# Patient Record
Sex: FEMALE | Race: WHITE | Employment: OTHER | URBAN - METROPOLITAN AREA
[De-identification: names, ages, dates, MRNs, and addresses within clinical notes are randomized per-mention and may not be internally consistent; named-entity substitution may affect disease eponyms.]

---

## 2017-01-06 RX ORDER — CLONIDINE HYDROCHLORIDE 0.2 MG/1
TABLET ORAL
Qty: 60 TABLET | Refills: 0 | Status: SHIPPED | OUTPATIENT
Start: 2017-01-06 | End: 2017-02-05 | Stop reason: SDUPTHER

## 2017-01-06 RX ORDER — TRIAMTERENE AND HYDROCHLOROTHIAZIDE 37.5; 25 MG/1; MG/1
TABLET ORAL
Qty: 30 TABLET | Refills: 0 | Status: SHIPPED | OUTPATIENT
Start: 2017-01-06 | End: 2017-02-05 | Stop reason: SDUPTHER

## 2017-01-06 RX ORDER — DICYCLOMINE HCL 20 MG
TABLET ORAL
Qty: 60 TABLET | Refills: 0 | Status: SHIPPED | OUTPATIENT
Start: 2017-01-06 | End: 2017-01-25 | Stop reason: SDUPTHER

## 2017-01-10 RX ORDER — FLUTICASONE PROPIONATE 50 MCG
SPRAY, SUSPENSION (ML) NASAL
Qty: 1 BOTTLE | Refills: 3 | Status: SHIPPED | OUTPATIENT
Start: 2017-01-10 | End: 2017-02-06 | Stop reason: SDUPTHER

## 2017-01-10 RX ORDER — LORAZEPAM 1 MG/1
TABLET ORAL
Qty: 180 TABLET | Refills: 1 | OUTPATIENT
Start: 2017-01-10 | End: 2017-03-07 | Stop reason: SDUPTHER

## 2017-01-25 RX ORDER — TRAMADOL HYDROCHLORIDE 50 MG/1
TABLET ORAL
Qty: 240 TABLET | Refills: 0 | OUTPATIENT
Start: 2017-01-25 | End: 2017-02-22 | Stop reason: SDUPTHER

## 2017-01-25 RX ORDER — ACETAMINOPHEN AND CODEINE PHOSPHATE 300; 60 MG/1; MG/1
TABLET ORAL
Qty: 180 TABLET | Refills: 0 | OUTPATIENT
Start: 2017-01-25 | End: 2017-02-22 | Stop reason: SDUPTHER

## 2017-01-25 RX ORDER — DICYCLOMINE HCL 20 MG
TABLET ORAL
Qty: 60 TABLET | Refills: 3 | OUTPATIENT
Start: 2017-01-25 | End: 2017-04-04 | Stop reason: SDUPTHER

## 2017-01-31 ENCOUNTER — TELEPHONE (OUTPATIENT)
Dept: INTERNAL MEDICINE | Age: 62
End: 2017-01-31

## 2017-02-06 ENCOUNTER — TELEPHONE (OUTPATIENT)
Dept: INTERNAL MEDICINE | Age: 62
End: 2017-02-06

## 2017-02-06 RX ORDER — CLONIDINE HYDROCHLORIDE 0.2 MG/1
TABLET ORAL
Qty: 60 TABLET | Refills: 0 | Status: SHIPPED | OUTPATIENT
Start: 2017-02-06 | End: 2017-03-20 | Stop reason: SDUPTHER

## 2017-02-06 RX ORDER — FLUTICASONE PROPIONATE 50 MCG
SPRAY, SUSPENSION (ML) NASAL
Qty: 1 BOTTLE | Refills: 3 | Status: SHIPPED | OUTPATIENT
Start: 2017-02-06 | End: 2017-06-14 | Stop reason: SDUPTHER

## 2017-02-06 RX ORDER — TRIAMTERENE AND HYDROCHLOROTHIAZIDE 37.5; 25 MG/1; MG/1
TABLET ORAL
Qty: 30 TABLET | Refills: 0 | Status: SHIPPED | OUTPATIENT
Start: 2017-02-06 | End: 2017-02-28 | Stop reason: SDUPTHER

## 2017-02-16 RX ORDER — OMEPRAZOLE 40 MG/1
CAPSULE, DELAYED RELEASE ORAL
Qty: 60 CAPSULE | Refills: 0 | Status: SHIPPED | OUTPATIENT
Start: 2017-02-16 | End: 2017-03-20 | Stop reason: SDUPTHER

## 2017-02-22 RX ORDER — TRAMADOL HYDROCHLORIDE 50 MG/1
TABLET ORAL
Qty: 240 TABLET | Refills: 0 | OUTPATIENT
Start: 2017-02-22 | End: 2017-03-20 | Stop reason: SDUPTHER

## 2017-02-22 RX ORDER — ACETAMINOPHEN AND CODEINE PHOSPHATE 300; 60 MG/1; MG/1
TABLET ORAL
Qty: 180 TABLET | Refills: 0 | OUTPATIENT
Start: 2017-02-22 | End: 2017-03-14 | Stop reason: SDUPTHER

## 2017-02-24 ENCOUNTER — TELEPHONE (OUTPATIENT)
Dept: INTERNAL MEDICINE | Age: 62
End: 2017-02-24

## 2017-02-28 ENCOUNTER — OFFICE VISIT (OUTPATIENT)
Dept: INTERNAL MEDICINE | Age: 62
End: 2017-02-28

## 2017-02-28 VITALS
SYSTOLIC BLOOD PRESSURE: 120 MMHG | HEART RATE: 88 BPM | DIASTOLIC BLOOD PRESSURE: 70 MMHG | WEIGHT: 178 LBS | HEIGHT: 68 IN | BODY MASS INDEX: 26.98 KG/M2

## 2017-02-28 DIAGNOSIS — M47.816 FACET ARTHROPATHY, LUMBAR: ICD-10-CM

## 2017-02-28 DIAGNOSIS — G47.9 SLEEP DISTURBANCE: ICD-10-CM

## 2017-02-28 PROCEDURE — 99213 OFFICE O/P EST LOW 20 MIN: CPT | Performed by: INTERNAL MEDICINE

## 2017-02-28 RX ORDER — RAMELTEON 8 MG/1
8 TABLET ORAL NIGHTLY
Qty: 30 TABLET | Refills: 5 | Status: SHIPPED | OUTPATIENT
Start: 2017-02-28 | End: 2017-03-30

## 2017-02-28 RX ORDER — TRIAMTERENE AND HYDROCHLOROTHIAZIDE 37.5; 25 MG/1; MG/1
TABLET ORAL
Qty: 30 TABLET | Refills: 2 | Status: SHIPPED | OUTPATIENT
Start: 2017-02-28 | End: 2017-04-24 | Stop reason: SDUPTHER

## 2017-02-28 ASSESSMENT — ENCOUNTER SYMPTOMS
RESPIRATORY NEGATIVE: 1
BACK PAIN: 1

## 2017-03-03 ENCOUNTER — TELEPHONE (OUTPATIENT)
Dept: INTERNAL MEDICINE | Age: 62
End: 2017-03-03

## 2017-03-07 RX ORDER — LORAZEPAM 1 MG/1
TABLET ORAL
Qty: 180 TABLET | Refills: 1 | OUTPATIENT
Start: 2017-03-07 | End: 2017-05-02 | Stop reason: SDUPTHER

## 2017-03-14 ENCOUNTER — TELEPHONE (OUTPATIENT)
Dept: INTERNAL MEDICINE | Age: 62
End: 2017-03-14

## 2017-03-14 RX ORDER — ACETAMINOPHEN AND CODEINE PHOSPHATE 300; 60 MG/1; MG/1
TABLET ORAL
Qty: 60 TABLET | Refills: 0 | OUTPATIENT
Start: 2017-03-14 | End: 2017-03-20 | Stop reason: SDUPTHER

## 2017-03-21 RX ORDER — OMEPRAZOLE 40 MG/1
CAPSULE, DELAYED RELEASE ORAL
Qty: 60 CAPSULE | Refills: 3
Start: 2017-03-21 | End: 2017-07-05 | Stop reason: SDUPTHER

## 2017-03-21 RX ORDER — ACETAMINOPHEN AND CODEINE PHOSPHATE 300; 60 MG/1; MG/1
TABLET ORAL
Qty: 180 TABLET | Refills: 0 | OUTPATIENT
Start: 2017-03-21 | End: 2017-03-21

## 2017-03-21 RX ORDER — TRAMADOL HYDROCHLORIDE 50 MG/1
TABLET ORAL
Qty: 240 TABLET | Refills: 0 | OUTPATIENT
Start: 2017-03-21 | End: 2017-04-18 | Stop reason: SDUPTHER

## 2017-03-21 RX ORDER — OMEPRAZOLE 40 MG/1
CAPSULE, DELAYED RELEASE ORAL
Qty: 60 CAPSULE | Refills: 3 | OUTPATIENT
Start: 2017-03-21 | End: 2017-03-21

## 2017-03-21 RX ORDER — CLONIDINE HYDROCHLORIDE 0.2 MG/1
TABLET ORAL
Qty: 60 TABLET | Refills: 3 | OUTPATIENT
Start: 2017-03-21 | End: 2017-07-05 | Stop reason: SDUPTHER

## 2017-03-21 RX ORDER — ACETAMINOPHEN AND CODEINE PHOSPHATE 300; 60 MG/1; MG/1
TABLET ORAL
Qty: 180 TABLET | Refills: 0 | OUTPATIENT
Start: 2017-03-21 | End: 2017-04-18 | Stop reason: SDUPTHER

## 2017-03-28 RX ORDER — EFINACONAZOLE 100 MG/ML
SOLUTION TOPICAL
Qty: 4 ML | Refills: 0 | Status: SHIPPED | OUTPATIENT
Start: 2017-03-28 | End: 2017-08-16 | Stop reason: ALTCHOICE

## 2017-04-05 RX ORDER — DICYCLOMINE HCL 20 MG
TABLET ORAL
Qty: 60 TABLET | Refills: 0 | Status: SHIPPED | OUTPATIENT
Start: 2017-04-05 | End: 2017-05-03 | Stop reason: SDUPTHER

## 2017-04-13 RX ORDER — IBUPROFEN 400 MG/1
TABLET ORAL
Qty: 90 TABLET | Refills: 0 | Status: SHIPPED | OUTPATIENT
Start: 2017-04-13 | End: 2017-05-19 | Stop reason: SDUPTHER

## 2017-04-13 RX ORDER — CITALOPRAM 10 MG/1
TABLET ORAL
Qty: 60 TABLET | Refills: 0 | Status: SHIPPED | OUTPATIENT
Start: 2017-04-13 | End: 2017-05-16 | Stop reason: SDUPTHER

## 2017-04-17 RX ORDER — AMOXICILLIN AND CLAVULANATE POTASSIUM 875; 125 MG/1; MG/1
1 TABLET, FILM COATED ORAL 2 TIMES DAILY
Qty: 20 TABLET | Refills: 0 | Status: SHIPPED | OUTPATIENT
Start: 2017-04-17 | End: 2017-06-06

## 2017-04-18 ENCOUNTER — TELEPHONE (OUTPATIENT)
Dept: ORTHOPEDIC SURGERY | Age: 62
End: 2017-04-18

## 2017-04-18 DIAGNOSIS — M54.40 BILATERAL LOW BACK PAIN WITH SCIATICA, SCIATICA LATERALITY UNSPECIFIED, UNSPECIFIED CHRONICITY: ICD-10-CM

## 2017-04-18 DIAGNOSIS — M47.26 OTHER SPONDYLOSIS WITH RADICULOPATHY, LUMBAR REGION: ICD-10-CM

## 2017-04-18 DIAGNOSIS — M12.9 ARTHROPATHY, UNSPECIFIED, SITE UNSPECIFIED: ICD-10-CM

## 2017-04-18 DIAGNOSIS — M47.816 FACET ARTHROPATHY, LUMBAR: Primary | ICD-10-CM

## 2017-04-18 DIAGNOSIS — M54.5 LOW BACK PAIN, UNSPECIFIED BACK PAIN LATERALITY, UNSPECIFIED CHRONICITY, WITH SCIATICA PRESENCE UNSPECIFIED: ICD-10-CM

## 2017-04-18 RX ORDER — ACETAMINOPHEN AND CODEINE PHOSPHATE 300; 60 MG/1; MG/1
TABLET ORAL
Qty: 180 TABLET | Refills: 0 | OUTPATIENT
Start: 2017-04-18 | End: 2017-05-16 | Stop reason: SDUPTHER

## 2017-04-18 RX ORDER — TRAMADOL HYDROCHLORIDE 50 MG/1
TABLET ORAL
Qty: 240 TABLET | Refills: 0 | OUTPATIENT
Start: 2017-04-18 | End: 2017-05-16 | Stop reason: SDUPTHER

## 2017-04-25 RX ORDER — TRIAMTERENE AND HYDROCHLOROTHIAZIDE 37.5; 25 MG/1; MG/1
TABLET ORAL
Qty: 90 TABLET | Refills: 2 | Status: SHIPPED | OUTPATIENT
Start: 2017-04-25 | End: 2018-01-28 | Stop reason: SDUPTHER

## 2017-05-02 ENCOUNTER — HOSPITAL ENCOUNTER (OUTPATIENT)
Dept: INTERVENTIONAL RADIOLOGY/VASCULAR | Age: 62
Discharge: HOME | End: 2017-05-02
Attending: PAIN MEDICINE | Admitting: PAIN MEDICINE

## 2017-05-02 VITALS
HEART RATE: 88 BPM | DIASTOLIC BLOOD PRESSURE: 74 MMHG | TEMPERATURE: 97 F | OXYGEN SATURATION: 97 % | HEIGHT: 69 IN | BODY MASS INDEX: 25.18 KG/M2 | RESPIRATION RATE: 16 BRPM | SYSTOLIC BLOOD PRESSURE: 148 MMHG | WEIGHT: 170 LBS

## 2017-05-02 DIAGNOSIS — M47.26 OTHER SPONDYLOSIS WITH RADICULOPATHY, LUMBAR REGION: ICD-10-CM

## 2017-05-02 DIAGNOSIS — M12.9 ARTHROPATHY, UNSPECIFIED, SITE UNSPECIFIED: ICD-10-CM

## 2017-05-02 DIAGNOSIS — M12.88 OTHER SPECIFIC ARTHROPATHIES, NOT ELSEWHERE CLASSIFIED, OTHER SPECIFIED SITE: ICD-10-CM

## 2017-05-02 PROCEDURE — 64493 INJ PARAVERT F JNT L/S 1 LEV: CPT | Performed by: PAIN MEDICINE

## 2017-05-02 PROCEDURE — 64494 INJ PARAVERT F JNT L/S 2 LEV: CPT | Performed by: PAIN MEDICINE

## 2017-05-02 RX ORDER — LORAZEPAM 1 MG/1
TABLET ORAL
Qty: 180 TABLET | Refills: 1 | OUTPATIENT
Start: 2017-05-02 | End: 2017-06-06 | Stop reason: SDUPTHER

## 2017-05-02 RX ORDER — METHYLPREDNISOLONE ACETATE 80 MG/ML
INJECTION, SUSPENSION INTRA-ARTICULAR; INTRALESIONAL; INTRAMUSCULAR; SOFT TISSUE DAILY PRN
Status: COMPLETED | OUTPATIENT
Start: 2017-05-02 | End: 2017-05-02

## 2017-05-02 RX ADMIN — METHYLPREDNISOLONE ACETATE 80 MG: 80 INJECTION, SUSPENSION INTRA-ARTICULAR; INTRALESIONAL; INTRAMUSCULAR; SOFT TISSUE at 15:06

## 2017-05-02 ASSESSMENT — PAIN - FUNCTIONAL ASSESSMENT: PAIN_FUNCTIONAL_ASSESSMENT: 0-10

## 2017-05-04 RX ORDER — DICYCLOMINE HCL 20 MG
TABLET ORAL
Qty: 60 TABLET | Refills: 1 | Status: SHIPPED | OUTPATIENT
Start: 2017-05-04 | End: 2017-05-30 | Stop reason: SDUPTHER

## 2017-05-16 ENCOUNTER — TELEPHONE (OUTPATIENT)
Dept: INTERNAL MEDICINE | Age: 62
End: 2017-05-16

## 2017-05-16 RX ORDER — TRAMADOL HYDROCHLORIDE 50 MG/1
TABLET ORAL
Qty: 240 TABLET | Refills: 0 | OUTPATIENT
Start: 2017-05-16 | End: 2017-06-16 | Stop reason: SDUPTHER

## 2017-05-16 RX ORDER — ACETAMINOPHEN AND CODEINE PHOSPHATE 300; 60 MG/1; MG/1
TABLET ORAL
Qty: 180 TABLET | Refills: 0 | OUTPATIENT
Start: 2017-05-16 | End: 2017-06-06

## 2017-05-16 RX ORDER — CITALOPRAM 10 MG/1
TABLET ORAL
Qty: 60 TABLET | Refills: 5 | Status: SHIPPED | OUTPATIENT
Start: 2017-05-16 | End: 2017-10-10

## 2017-05-19 RX ORDER — IBUPROFEN 400 MG/1
TABLET ORAL
Qty: 90 TABLET | Refills: 0 | Status: SHIPPED | OUTPATIENT
Start: 2017-05-19 | End: 2017-07-04 | Stop reason: SDUPTHER

## 2017-05-25 ENCOUNTER — OFFICE VISIT (OUTPATIENT)
Dept: ORTHOPEDIC SURGERY | Age: 62
End: 2017-05-25

## 2017-05-25 VITALS
SYSTOLIC BLOOD PRESSURE: 123 MMHG | HEART RATE: 83 BPM | DIASTOLIC BLOOD PRESSURE: 72 MMHG | BODY MASS INDEX: 25.76 KG/M2 | TEMPERATURE: 98.2 F | HEIGHT: 68 IN | WEIGHT: 170 LBS

## 2017-05-25 DIAGNOSIS — M47.816 FACET ARTHROPATHY, LUMBAR: ICD-10-CM

## 2017-05-25 DIAGNOSIS — M47.816 FACET ARTHROPATHY, LUMBAR: Primary | ICD-10-CM

## 2017-05-25 PROCEDURE — 99213 OFFICE O/P EST LOW 20 MIN: CPT | Performed by: ORTHOPAEDIC SURGERY

## 2017-05-25 PROCEDURE — L0625 LO FLEX L1-BELOW L5 PRE OTS: HCPCS | Performed by: ORTHOPAEDIC SURGERY

## 2017-05-25 RX ORDER — TIZANIDINE 4 MG/1
4 TABLET ORAL EVERY 6 HOURS PRN
Qty: 90 TABLET | Refills: 0 | Status: SHIPPED | OUTPATIENT
Start: 2017-05-25 | End: 2017-05-25 | Stop reason: SDUPTHER

## 2017-05-30 RX ORDER — DICYCLOMINE HCL 20 MG
TABLET ORAL
Qty: 60 TABLET | Refills: 0 | Status: SHIPPED | OUTPATIENT
Start: 2017-05-30 | End: 2017-06-11 | Stop reason: SDUPTHER

## 2017-05-30 RX ORDER — TIZANIDINE 4 MG/1
TABLET ORAL
Qty: 368 TABLET | Refills: 0 | Status: SHIPPED | OUTPATIENT
Start: 2017-05-30 | End: 2017-07-03 | Stop reason: ALTCHOICE

## 2017-06-06 ENCOUNTER — OFFICE VISIT (OUTPATIENT)
Dept: INTERNAL MEDICINE | Age: 62
End: 2017-06-06

## 2017-06-06 VITALS
HEART RATE: 120 BPM | SYSTOLIC BLOOD PRESSURE: 90 MMHG | WEIGHT: 166 LBS | DIASTOLIC BLOOD PRESSURE: 60 MMHG | BODY MASS INDEX: 25.16 KG/M2 | HEIGHT: 68 IN

## 2017-06-06 DIAGNOSIS — M54.40 BILATERAL LOW BACK PAIN WITH SCIATICA, SCIATICA LATERALITY UNSPECIFIED, UNSPECIFIED CHRONICITY: ICD-10-CM

## 2017-06-06 DIAGNOSIS — E78.5 HYPERLIPIDEMIA, UNSPECIFIED HYPERLIPIDEMIA TYPE: ICD-10-CM

## 2017-06-06 DIAGNOSIS — I10 ESSENTIAL HYPERTENSION: ICD-10-CM

## 2017-06-06 DIAGNOSIS — G47.9 SLEEP DISTURBANCE: ICD-10-CM

## 2017-06-06 DIAGNOSIS — K59.01 SLOW TRANSIT CONSTIPATION: ICD-10-CM

## 2017-06-06 DIAGNOSIS — F41.1 ANXIETY STATE: ICD-10-CM

## 2017-06-06 DIAGNOSIS — M79.7 FIBROMYALGIA: ICD-10-CM

## 2017-06-06 PROCEDURE — G8510 SCR DEP NEG, NO PLAN REQD: HCPCS | Performed by: INTERNAL MEDICINE

## 2017-06-06 PROCEDURE — 99214 OFFICE O/P EST MOD 30 MIN: CPT | Performed by: INTERNAL MEDICINE

## 2017-06-06 RX ORDER — LORAZEPAM 1 MG/1
TABLET ORAL
Qty: 180 TABLET | Refills: 2 | Status: SHIPPED | OUTPATIENT
Start: 2017-06-06 | End: 2017-09-20 | Stop reason: SDUPTHER

## 2017-06-06 ASSESSMENT — ENCOUNTER SYMPTOMS
BACK PAIN: 1
RESPIRATORY NEGATIVE: 1
CONSTIPATION: 0

## 2017-06-06 ASSESSMENT — PATIENT HEALTH QUESTIONNAIRE - PHQ9
SUM OF ALL RESPONSES TO PHQ QUESTIONS 1-9: 0
SUM OF ALL RESPONSES TO PHQ9 QUESTIONS 1 & 2: 0
2. FEELING DOWN, DEPRESSED OR HOPELESS: 0
1. LITTLE INTEREST OR PLEASURE IN DOING THINGS: 0

## 2017-06-12 RX ORDER — DICYCLOMINE HCL 20 MG
TABLET ORAL
Qty: 60 TABLET | Refills: 0 | Status: SHIPPED | OUTPATIENT
Start: 2017-06-12 | End: 2017-06-28 | Stop reason: SDUPTHER

## 2017-06-14 RX ORDER — FLUTICASONE PROPIONATE 50 MCG
SPRAY, SUSPENSION (ML) NASAL
Qty: 1 BOTTLE | Refills: 3 | Status: SHIPPED | OUTPATIENT
Start: 2017-06-14 | End: 2017-06-14 | Stop reason: SDUPTHER

## 2017-06-14 RX ORDER — FLUTICASONE PROPIONATE 50 MCG
SPRAY, SUSPENSION (ML) NASAL
Qty: 48 G | Refills: 3 | Status: SHIPPED | OUTPATIENT
Start: 2017-06-14 | End: 2018-06-03 | Stop reason: SDUPTHER

## 2017-06-16 ENCOUNTER — TELEPHONE (OUTPATIENT)
Dept: INTERNAL MEDICINE | Age: 62
End: 2017-06-16

## 2017-06-16 RX ORDER — TRAMADOL HYDROCHLORIDE 50 MG/1
TABLET ORAL
Qty: 240 TABLET | Refills: 0 | OUTPATIENT
Start: 2017-06-16 | End: 2017-07-11 | Stop reason: SDUPTHER

## 2017-06-19 ENCOUNTER — TELEPHONE (OUTPATIENT)
Dept: INTERNAL MEDICINE | Age: 62
End: 2017-06-19

## 2017-06-22 ENCOUNTER — TELEPHONE (OUTPATIENT)
Dept: INTERNAL MEDICINE | Age: 62
End: 2017-06-22

## 2017-06-28 RX ORDER — DICYCLOMINE HCL 20 MG
TABLET ORAL
Qty: 60 TABLET | Refills: 2 | Status: SHIPPED | OUTPATIENT
Start: 2017-06-28 | End: 2017-08-11 | Stop reason: SDUPTHER

## 2017-07-03 ENCOUNTER — OFFICE VISIT (OUTPATIENT)
Dept: PAIN MANAGEMENT | Age: 62
End: 2017-07-03

## 2017-07-03 VITALS
BODY MASS INDEX: 25.33 KG/M2 | HEIGHT: 69 IN | HEART RATE: 117 BPM | SYSTOLIC BLOOD PRESSURE: 125 MMHG | WEIGHT: 171 LBS | DIASTOLIC BLOOD PRESSURE: 75 MMHG

## 2017-07-03 DIAGNOSIS — M47.27 LUMBOSACRAL SPONDYLOSIS WITH RADICULOPATHY: Primary | ICD-10-CM

## 2017-07-03 DIAGNOSIS — G89.29 OTHER CHRONIC BACK PAIN: ICD-10-CM

## 2017-07-03 DIAGNOSIS — M54.9 OTHER CHRONIC BACK PAIN: ICD-10-CM

## 2017-07-03 DIAGNOSIS — F11.90 CHRONIC, CONTINUOUS USE OF OPIOIDS: ICD-10-CM

## 2017-07-03 DIAGNOSIS — G89.29 OTHER CHRONIC PAIN: ICD-10-CM

## 2017-07-03 DIAGNOSIS — M47.812 SPONDYLOSIS OF CERVICAL JOINT WITHOUT MYELOPATHY: ICD-10-CM

## 2017-07-03 LAB
AMPHETAMINE SCREEN, URINE: POSITIVE
BARBITURATE SCREEN URINE: ABNORMAL
BENZODIAZEPINE SCREEN, URINE: ABNORMAL
CANNABINOID SCREEN URINE: ABNORMAL
COCAINE METABOLITE SCREEN URINE: ABNORMAL
Lab: ABNORMAL
METHADONE SCREEN, URINE: ABNORMAL
OPIATE SCREEN URINE: ABNORMAL
OXYCODONE URINE: ABNORMAL
PH UA: 5
PHENCYCLIDINE SCREEN URINE: ABNORMAL
PROPOXYPHENE SCREEN: ABNORMAL

## 2017-07-03 PROCEDURE — 99215 OFFICE O/P EST HI 40 MIN: CPT | Performed by: ANESTHESIOLOGY

## 2017-07-03 PROCEDURE — G0444 DEPRESSION SCREEN ANNUAL: HCPCS | Performed by: ANESTHESIOLOGY

## 2017-07-03 RX ORDER — BACLOFEN 10 MG/1
10 TABLET ORAL 2 TIMES DAILY
Qty: 30 TABLET | Refills: 0 | Status: SHIPPED | OUTPATIENT
Start: 2017-07-03 | End: 2017-08-16 | Stop reason: ALTCHOICE

## 2017-07-03 RX ORDER — CETIRIZINE HYDROCHLORIDE 10 MG/1
10 TABLET ORAL DAILY
COMMUNITY
End: 2018-01-15

## 2017-07-03 ASSESSMENT — ENCOUNTER SYMPTOMS
EYE DISCHARGE: 0
WHEEZING: 0
VOMITING: 0
SHORTNESS OF BREATH: 0
EYE PAIN: 0
ORTHOPNEA: 0
NAUSEA: 0
BACK PAIN: 1
EYE REDNESS: 0
CONSTIPATION: 0
HEMOPTYSIS: 0
HEARTBURN: 0
COUGH: 0
ABDOMINAL PAIN: 0

## 2017-07-03 ASSESSMENT — PATIENT HEALTH QUESTIONNAIRE - PHQ9
6. FEELING BAD ABOUT YOURSELF - OR THAT YOU ARE A FAILURE OR HAVE LET YOURSELF OR YOUR FAMILY DOWN: 2
2. FEELING DOWN, DEPRESSED OR HOPELESS: 1
SUM OF ALL RESPONSES TO PHQ9 QUESTIONS 1 & 2: 4
4. FEELING TIRED OR HAVING LITTLE ENERGY: 3
10. IF YOU CHECKED OFF ANY PROBLEMS, HOW DIFFICULT HAVE THESE PROBLEMS MADE IT FOR YOU TO DO YOUR WORK, TAKE CARE OF THINGS AT HOME, OR GET ALONG WITH OTHER PEOPLE: 3
9. THOUGHTS THAT YOU WOULD BE BETTER OFF DEAD, OR OF HURTING YOURSELF: 0
8. MOVING OR SPEAKING SO SLOWLY THAT OTHER PEOPLE COULD HAVE NOTICED. OR THE OPPOSITE, BEING SO FIGETY OR RESTLESS THAT YOU HAVE BEEN MOVING AROUND A LOT MORE THAN USUAL: 1
1. LITTLE INTEREST OR PLEASURE IN DOING THINGS: 3
SUM OF ALL RESPONSES TO PHQ QUESTIONS 1-9: 17
3. TROUBLE FALLING OR STAYING ASLEEP: 3
5. POOR APPETITE OR OVEREATING: 2
7. TROUBLE CONCENTRATING ON THINGS, SUCH AS READING THE NEWSPAPER OR WATCHING TELEVISION: 2

## 2017-07-05 ENCOUNTER — TELEPHONE (OUTPATIENT)
Dept: INTERNAL MEDICINE | Age: 62
End: 2017-07-05

## 2017-07-05 RX ORDER — CLONIDINE HYDROCHLORIDE 0.2 MG/1
TABLET ORAL
Qty: 180 TABLET | Refills: 3 | Status: SHIPPED | OUTPATIENT
Start: 2017-07-05 | End: 2017-08-22 | Stop reason: SDUPTHER

## 2017-07-05 RX ORDER — OMEPRAZOLE 40 MG/1
CAPSULE, DELAYED RELEASE ORAL
Qty: 180 CAPSULE | Refills: 3 | Status: SHIPPED | OUTPATIENT
Start: 2017-07-05 | End: 2018-06-25 | Stop reason: SDUPTHER

## 2017-07-05 RX ORDER — IBUPROFEN 400 MG/1
TABLET ORAL
Qty: 90 TABLET | Refills: 0 | Status: SHIPPED | OUTPATIENT
Start: 2017-07-05 | End: 2017-08-22

## 2017-07-05 RX ORDER — CLONIDINE HYDROCHLORIDE 0.2 MG/1
TABLET ORAL
Qty: 60 TABLET | Refills: 3 | Status: SHIPPED | OUTPATIENT
Start: 2017-07-05 | End: 2017-07-05 | Stop reason: SDUPTHER

## 2017-07-05 RX ORDER — OMEPRAZOLE 40 MG/1
CAPSULE, DELAYED RELEASE ORAL
Qty: 60 CAPSULE | Refills: 3 | Status: SHIPPED | OUTPATIENT
Start: 2017-07-05 | End: 2017-07-05 | Stop reason: SDUPTHER

## 2017-07-11 ENCOUNTER — TELEPHONE (OUTPATIENT)
Dept: PAIN MANAGEMENT | Age: 62
End: 2017-07-11

## 2017-07-11 RX ORDER — TRAMADOL HYDROCHLORIDE 50 MG/1
TABLET ORAL
Qty: 240 TABLET | Refills: 0 | OUTPATIENT
Start: 2017-07-11 | End: 2017-08-16 | Stop reason: ALTCHOICE

## 2017-07-24 ENCOUNTER — OFFICE VISIT (OUTPATIENT)
Dept: ORTHOPEDIC SURGERY | Age: 62
End: 2017-07-24

## 2017-07-24 VITALS
WEIGHT: 171 LBS | DIASTOLIC BLOOD PRESSURE: 74 MMHG | HEART RATE: 113 BPM | HEIGHT: 68 IN | TEMPERATURE: 97.3 F | SYSTOLIC BLOOD PRESSURE: 125 MMHG | BODY MASS INDEX: 25.91 KG/M2

## 2017-07-24 DIAGNOSIS — M47.816 FACET ARTHROPATHY, LUMBAR: ICD-10-CM

## 2017-07-24 DIAGNOSIS — M79.7 FIBROMYALGIA: ICD-10-CM

## 2017-07-24 DIAGNOSIS — M43.10 DEGENERATIVE SPONDYLOLISTHESIS: ICD-10-CM

## 2017-07-24 DIAGNOSIS — M47.816 FACET ARTHROPATHY, LUMBAR: Primary | ICD-10-CM

## 2017-07-24 PROCEDURE — 99213 OFFICE O/P EST LOW 20 MIN: CPT | Performed by: ORTHOPAEDIC SURGERY

## 2017-07-24 RX ORDER — TIZANIDINE 4 MG/1
4 TABLET ORAL EVERY 6 HOURS PRN
Qty: 90 TABLET | Refills: 0 | Status: SHIPPED | OUTPATIENT
Start: 2017-07-24 | End: 2017-08-11 | Stop reason: SDUPTHER

## 2017-08-03 ENCOUNTER — TELEPHONE (OUTPATIENT)
Dept: INTERNAL MEDICINE | Age: 62
End: 2017-08-03

## 2017-08-04 DIAGNOSIS — M47.27 LUMBOSACRAL SPONDYLOSIS WITH RADICULOPATHY: Primary | ICD-10-CM

## 2017-08-04 DIAGNOSIS — M43.10 DEGENERATIVE SPONDYLOLISTHESIS: ICD-10-CM

## 2017-08-07 RX ORDER — HYDROCODONE BITARTRATE AND ACETAMINOPHEN 5; 325 MG/1; MG/1
TABLET ORAL
Qty: 30 TABLET | Refills: 0 | Status: SHIPPED | OUTPATIENT
Start: 2017-08-07 | End: 2017-08-16 | Stop reason: ALTCHOICE

## 2017-08-11 ENCOUNTER — TELEPHONE (OUTPATIENT)
Dept: PAIN MANAGEMENT | Age: 62
End: 2017-08-11

## 2017-08-11 DIAGNOSIS — M47.816 FACET ARTHROPATHY, LUMBAR: ICD-10-CM

## 2017-08-11 RX ORDER — TIZANIDINE 4 MG/1
TABLET ORAL
Qty: 90 TABLET | Refills: 0 | Status: SHIPPED | OUTPATIENT
Start: 2017-08-11 | End: 2017-08-23 | Stop reason: SDUPTHER

## 2017-08-11 RX ORDER — DICYCLOMINE HCL 20 MG
TABLET ORAL
Qty: 60 TABLET | Refills: 0 | Status: SHIPPED | OUTPATIENT
Start: 2017-08-11 | End: 2017-08-26 | Stop reason: SDUPTHER

## 2017-08-14 ENCOUNTER — TELEPHONE (OUTPATIENT)
Dept: ORTHOPEDIC SURGERY | Age: 62
End: 2017-08-14

## 2017-08-14 DIAGNOSIS — G89.29 CHRONIC INTRACTABLE PAIN: ICD-10-CM

## 2017-08-14 DIAGNOSIS — M47.812 CERVICAL SPONDYLOSIS WITHOUT MYELOPATHY: ICD-10-CM

## 2017-08-14 DIAGNOSIS — M47.27 LUMBOSACRAL RADICULOPATHY DUE TO DEGENERATIVE JOINT DISEASE OF SPINE: Primary | ICD-10-CM

## 2017-08-16 ENCOUNTER — OFFICE VISIT (OUTPATIENT)
Dept: PAIN MANAGEMENT | Age: 62
End: 2017-08-16

## 2017-08-16 ENCOUNTER — TELEPHONE (OUTPATIENT)
Dept: INTERNAL MEDICINE | Age: 62
End: 2017-08-16

## 2017-08-16 VITALS
SYSTOLIC BLOOD PRESSURE: 104 MMHG | DIASTOLIC BLOOD PRESSURE: 68 MMHG | BODY MASS INDEX: 25.83 KG/M2 | WEIGHT: 174.4 LBS | HEIGHT: 69 IN | HEART RATE: 108 BPM

## 2017-08-16 DIAGNOSIS — M43.10 DEGENERATIVE SPONDYLOLISTHESIS: ICD-10-CM

## 2017-08-16 DIAGNOSIS — M47.27 LUMBOSACRAL SPONDYLOSIS WITH RADICULOPATHY: Primary | ICD-10-CM

## 2017-08-16 DIAGNOSIS — Z79.891 CHRONIC PRESCRIPTION OPIATE USE: ICD-10-CM

## 2017-08-16 PROCEDURE — 99213 OFFICE O/P EST LOW 20 MIN: CPT | Performed by: ANESTHESIOLOGY

## 2017-08-16 RX ORDER — OXYCODONE HYDROCHLORIDE AND ACETAMINOPHEN 5; 325 MG/1; MG/1
1 TABLET ORAL EVERY 8 HOURS PRN
Qty: 60 TABLET | Refills: 0 | Status: SHIPPED | OUTPATIENT
Start: 2017-08-16 | End: 2017-09-06 | Stop reason: SDUPTHER

## 2017-08-16 RX ORDER — ALBUTEROL SULFATE 90 UG/1
2 AEROSOL, METERED RESPIRATORY (INHALATION) EVERY 6 HOURS PRN
Qty: 1 INHALER | Refills: 3 | Status: SHIPPED | OUTPATIENT
Start: 2017-08-16 | End: 2019-02-15 | Stop reason: SDUPTHER

## 2017-08-21 ENCOUNTER — TELEPHONE (OUTPATIENT)
Dept: ORTHOPEDIC SURGERY | Age: 62
End: 2017-08-21

## 2017-08-22 ENCOUNTER — OFFICE VISIT (OUTPATIENT)
Dept: INTERNAL MEDICINE | Age: 62
End: 2017-08-22

## 2017-08-22 VITALS
BODY MASS INDEX: 25.61 KG/M2 | SYSTOLIC BLOOD PRESSURE: 160 MMHG | HEART RATE: 100 BPM | HEIGHT: 68 IN | DIASTOLIC BLOOD PRESSURE: 68 MMHG | WEIGHT: 169 LBS

## 2017-08-22 DIAGNOSIS — F41.1 ANXIETY STATE: ICD-10-CM

## 2017-08-22 DIAGNOSIS — I10 ESSENTIAL HYPERTENSION: ICD-10-CM

## 2017-08-22 DIAGNOSIS — K21.9 GASTROESOPHAGEAL REFLUX DISEASE WITHOUT ESOPHAGITIS: ICD-10-CM

## 2017-08-22 DIAGNOSIS — G89.29 CHRONIC LOW BACK PAIN, UNSPECIFIED BACK PAIN LATERALITY, WITH SCIATICA PRESENCE UNSPECIFIED: ICD-10-CM

## 2017-08-22 DIAGNOSIS — Z01.818 PREOP EXAMINATION: ICD-10-CM

## 2017-08-22 DIAGNOSIS — R53.83 FATIGUE, UNSPECIFIED TYPE: ICD-10-CM

## 2017-08-22 DIAGNOSIS — M54.5 CHRONIC LOW BACK PAIN, UNSPECIFIED BACK PAIN LATERALITY, WITH SCIATICA PRESENCE UNSPECIFIED: ICD-10-CM

## 2017-08-22 DIAGNOSIS — Z23 NEED FOR PNEUMOCOCCAL VACCINATION: Primary | ICD-10-CM

## 2017-08-22 DIAGNOSIS — F32.A DEPRESSIVE DISORDER: ICD-10-CM

## 2017-08-22 PROCEDURE — 99215 OFFICE O/P EST HI 40 MIN: CPT | Performed by: INTERNAL MEDICINE

## 2017-08-22 PROCEDURE — 90670 PCV13 VACCINE IM: CPT | Performed by: INTERNAL MEDICINE

## 2017-08-22 PROCEDURE — G0009 ADMIN PNEUMOCOCCAL VACCINE: HCPCS | Performed by: INTERNAL MEDICINE

## 2017-08-22 RX ORDER — ESTRADIOL 2 MG/1
2 TABLET ORAL DAILY
Qty: 90 TABLET | Refills: 3 | COMMUNITY
Start: 2017-08-22 | End: 2018-05-16 | Stop reason: SDUPTHER

## 2017-08-22 RX ORDER — CLONIDINE HYDROCHLORIDE 0.2 MG/1
0.1 TABLET ORAL EVERY 8 HOURS
Qty: 180 TABLET | Refills: 3 | COMMUNITY
Start: 2017-08-22 | End: 2018-04-16 | Stop reason: SDUPTHER

## 2017-08-22 ASSESSMENT — ENCOUNTER SYMPTOMS
TROUBLE SWALLOWING: 0
RESPIRATORY NEGATIVE: 1
BACK PAIN: 1
GASTROINTESTINAL NEGATIVE: 1

## 2017-08-23 ENCOUNTER — TELEPHONE (OUTPATIENT)
Dept: ORTHOPEDIC SURGERY | Age: 62
End: 2017-08-23

## 2017-08-23 ENCOUNTER — TELEPHONE (OUTPATIENT)
Dept: INTERNAL MEDICINE | Age: 62
End: 2017-08-23

## 2017-08-23 DIAGNOSIS — Z79.891 CHRONIC PRESCRIPTION OPIATE USE: ICD-10-CM

## 2017-08-23 DIAGNOSIS — M79.7 FIBROMYALGIA: ICD-10-CM

## 2017-08-23 DIAGNOSIS — M43.10 DEGENERATIVE SPONDYLOLISTHESIS: Primary | ICD-10-CM

## 2017-08-23 DIAGNOSIS — M47.816 FACET ARTHROPATHY, LUMBAR: ICD-10-CM

## 2017-08-23 RX ORDER — TIZANIDINE 4 MG/1
TABLET ORAL
Qty: 90 TABLET | Refills: 3 | Status: SHIPPED | OUTPATIENT
Start: 2017-08-23 | End: 2017-09-06 | Stop reason: SDUPTHER

## 2017-08-25 ENCOUNTER — TELEPHONE (OUTPATIENT)
Dept: PAIN MANAGEMENT | Age: 62
End: 2017-08-25

## 2017-08-25 ENCOUNTER — TELEPHONE (OUTPATIENT)
Dept: ORTHOPEDIC SURGERY | Age: 62
End: 2017-08-25

## 2017-08-28 RX ORDER — DICYCLOMINE HCL 20 MG
TABLET ORAL
Qty: 60 TABLET | Refills: 0 | Status: SHIPPED | OUTPATIENT
Start: 2017-08-28 | End: 2017-10-31 | Stop reason: SDUPTHER

## 2017-09-01 ENCOUNTER — TELEPHONE (OUTPATIENT)
Dept: INTERNAL MEDICINE | Age: 62
End: 2017-09-01

## 2017-09-01 RX ORDER — SULFACETAMIDE SODIUM 100 MG/ML
SOLUTION/ DROPS OPHTHALMIC
Qty: 5 ML | Refills: 0 | Status: SHIPPED | OUTPATIENT
Start: 2017-09-01 | End: 2018-01-31

## 2017-09-06 ENCOUNTER — OFFICE VISIT (OUTPATIENT)
Dept: PAIN MANAGEMENT | Age: 62
End: 2017-09-06

## 2017-09-06 VITALS
WEIGHT: 172 LBS | DIASTOLIC BLOOD PRESSURE: 77 MMHG | HEART RATE: 127 BPM | HEIGHT: 69 IN | BODY MASS INDEX: 25.48 KG/M2 | SYSTOLIC BLOOD PRESSURE: 117 MMHG

## 2017-09-06 DIAGNOSIS — M47.27 LUMBOSACRAL SPONDYLOSIS WITH RADICULOPATHY: ICD-10-CM

## 2017-09-06 DIAGNOSIS — Z79.891 CHRONIC PRESCRIPTION OPIATE USE: ICD-10-CM

## 2017-09-06 DIAGNOSIS — M43.10 DEGENERATIVE SPONDYLOLISTHESIS: Primary | ICD-10-CM

## 2017-09-06 PROCEDURE — 99214 OFFICE O/P EST MOD 30 MIN: CPT | Performed by: ANESTHESIOLOGY

## 2017-09-06 RX ORDER — OXYCODONE HYDROCHLORIDE AND ACETAMINOPHEN 5; 325 MG/1; MG/1
1 TABLET ORAL 2 TIMES DAILY PRN
Qty: 60 TABLET | Refills: 0 | Status: SHIPPED | OUTPATIENT
Start: 2017-09-06 | End: 2017-09-26 | Stop reason: SDUPTHER

## 2017-09-06 RX ORDER — TIZANIDINE 4 MG/1
4 TABLET ORAL EVERY 8 HOURS PRN
Qty: 90 TABLET | Refills: 1 | Status: SHIPPED | OUTPATIENT
Start: 2017-09-06 | End: 2017-09-14 | Stop reason: SDUPTHER

## 2017-09-06 ASSESSMENT — ENCOUNTER SYMPTOMS
ORTHOPNEA: 0
EYE DISCHARGE: 0
BACK PAIN: 1
HEMOPTYSIS: 0
VOMITING: 0
SHORTNESS OF BREATH: 0
EYE PAIN: 0
ABDOMINAL PAIN: 0
CONSTIPATION: 0
WHEEZING: 0
EYE REDNESS: 0
HEARTBURN: 0
NAUSEA: 0
COUGH: 0

## 2017-09-11 ENCOUNTER — PATIENT MESSAGE (OUTPATIENT)
Dept: PAIN MANAGEMENT | Age: 62
End: 2017-09-11

## 2017-09-14 DIAGNOSIS — M47.27 LUMBOSACRAL SPONDYLOSIS WITH RADICULOPATHY: ICD-10-CM

## 2017-09-14 DIAGNOSIS — M43.10 DEGENERATIVE SPONDYLOLISTHESIS: ICD-10-CM

## 2017-09-14 RX ORDER — TIZANIDINE 4 MG/1
TABLET ORAL
Qty: 270 TABLET | Refills: 1 | Status: SHIPPED | OUTPATIENT
Start: 2017-09-14 | End: 2018-04-04 | Stop reason: SINTOL

## 2017-09-14 RX ORDER — ALPRAZOLAM 0.5 MG/1
1 TABLET ORAL ONCE
Status: DISCONTINUED | OUTPATIENT
Start: 2017-09-14 | End: 2017-09-15 | Stop reason: HOSPADM

## 2017-09-19 ENCOUNTER — OFFICE VISIT (OUTPATIENT)
Dept: PSYCHOLOGY | Age: 62
End: 2017-09-19

## 2017-09-19 ENCOUNTER — TELEPHONE (OUTPATIENT)
Dept: PAIN MANAGEMENT | Age: 62
End: 2017-09-19

## 2017-09-19 DIAGNOSIS — F41.1 ANXIETY STATE: ICD-10-CM

## 2017-09-19 DIAGNOSIS — G89.29 OTHER CHRONIC PAIN: Primary | ICD-10-CM

## 2017-09-19 PROCEDURE — 90791 PSYCH DIAGNOSTIC EVALUATION: CPT | Performed by: PSYCHOLOGIST

## 2017-09-19 ASSESSMENT — PATIENT HEALTH QUESTIONNAIRE - PHQ9
8. MOVING OR SPEAKING SO SLOWLY THAT OTHER PEOPLE COULD HAVE NOTICED. OR THE OPPOSITE, BEING SO FIGETY OR RESTLESS THAT YOU HAVE BEEN MOVING AROUND A LOT MORE THAN USUAL: 1
10. IF YOU CHECKED OFF ANY PROBLEMS, HOW DIFFICULT HAVE THESE PROBLEMS MADE IT FOR YOU TO DO YOUR WORK, TAKE CARE OF THINGS AT HOME, OR GET ALONG WITH OTHER PEOPLE: 2
SUM OF ALL RESPONSES TO PHQ QUESTIONS 1-9: 14
9. THOUGHTS THAT YOU WOULD BE BETTER OFF DEAD, OR OF HURTING YOURSELF: 0
SUM OF ALL RESPONSES TO PHQ9 QUESTIONS 1 & 2: 5
6. FEELING BAD ABOUT YOURSELF - OR THAT YOU ARE A FAILURE OR HAVE LET YOURSELF OR YOUR FAMILY DOWN: 0
3. TROUBLE FALLING OR STAYING ASLEEP: 3
5. POOR APPETITE OR OVEREATING: 2
1. LITTLE INTEREST OR PLEASURE IN DOING THINGS: 3
2. FEELING DOWN, DEPRESSED OR HOPELESS: 2
7. TROUBLE CONCENTRATING ON THINGS, SUCH AS READING THE NEWSPAPER OR WATCHING TELEVISION: 0
4. FEELING TIRED OR HAVING LITTLE ENERGY: 3

## 2017-09-21 ENCOUNTER — TELEPHONE (OUTPATIENT)
Dept: INTERNAL MEDICINE | Age: 62
End: 2017-09-21

## 2017-09-22 RX ORDER — LORAZEPAM 1 MG/1
TABLET ORAL
Qty: 180 TABLET | Refills: 1 | OUTPATIENT
Start: 2017-09-22 | End: 2017-11-07

## 2017-09-25 DIAGNOSIS — M43.10 DEGENERATIVE SPONDYLOLISTHESIS: ICD-10-CM

## 2017-09-25 DIAGNOSIS — M47.27 LUMBOSACRAL SPONDYLOSIS WITH RADICULOPATHY: ICD-10-CM

## 2017-09-26 ENCOUNTER — OFFICE VISIT (OUTPATIENT)
Dept: PSYCHOLOGY | Age: 62
End: 2017-09-26

## 2017-09-26 DIAGNOSIS — F41.1 ANXIETY STATE: ICD-10-CM

## 2017-09-26 DIAGNOSIS — M79.7 FIBROMYALGIA: Primary | ICD-10-CM

## 2017-09-26 PROCEDURE — 90832 PSYTX W PT 30 MINUTES: CPT | Performed by: PSYCHOLOGIST

## 2017-09-26 RX ORDER — OXYCODONE HYDROCHLORIDE AND ACETAMINOPHEN 5; 325 MG/1; MG/1
1 TABLET ORAL 2 TIMES DAILY PRN
Qty: 60 TABLET | Refills: 0 | Status: SHIPPED | OUTPATIENT
Start: 2017-09-26 | End: 2017-10-03

## 2017-09-26 ASSESSMENT — PATIENT HEALTH QUESTIONNAIRE - PHQ9
7. TROUBLE CONCENTRATING ON THINGS, SUCH AS READING THE NEWSPAPER OR WATCHING TELEVISION: 1
5. POOR APPETITE OR OVEREATING: 1
10. IF YOU CHECKED OFF ANY PROBLEMS, HOW DIFFICULT HAVE THESE PROBLEMS MADE IT FOR YOU TO DO YOUR WORK, TAKE CARE OF THINGS AT HOME, OR GET ALONG WITH OTHER PEOPLE: 2
8. MOVING OR SPEAKING SO SLOWLY THAT OTHER PEOPLE COULD HAVE NOTICED. OR THE OPPOSITE, BEING SO FIGETY OR RESTLESS THAT YOU HAVE BEEN MOVING AROUND A LOT MORE THAN USUAL: 0
4. FEELING TIRED OR HAVING LITTLE ENERGY: 2
2. FEELING DOWN, DEPRESSED OR HOPELESS: 1
3. TROUBLE FALLING OR STAYING ASLEEP: 3
SUM OF ALL RESPONSES TO PHQ9 QUESTIONS 1 & 2: 3
SUM OF ALL RESPONSES TO PHQ QUESTIONS 1-9: 10
1. LITTLE INTEREST OR PLEASURE IN DOING THINGS: 2
9. THOUGHTS THAT YOU WOULD BE BETTER OFF DEAD, OR OF HURTING YOURSELF: 0
6. FEELING BAD ABOUT YOURSELF - OR THAT YOU ARE A FAILURE OR HAVE LET YOURSELF OR YOUR FAMILY DOWN: 0

## 2017-09-26 ASSESSMENT — ANXIETY QUESTIONNAIRES
2. NOT BEING ABLE TO STOP OR CONTROL WORRYING: 1-SEVERAL DAYS
5. BEING SO RESTLESS THAT IT IS HARD TO SIT STILL: 1-SEVERAL DAYS
7. FEELING AFRAID AS IF SOMETHING AWFUL MIGHT HAPPEN: 0-NOT AT ALL SURE
1. FEELING NERVOUS, ANXIOUS, OR ON EDGE: 2-OVER HALF THE DAYS
GAD7 TOTAL SCORE: 11
3. WORRYING TOO MUCH ABOUT DIFFERENT THINGS: 2-OVER HALF THE DAYS
6. BECOMING EASILY ANNOYED OR IRRITABLE: 3-NEARLY EVERY DAY
4. TROUBLE RELAXING: 2-OVER HALF THE DAYS

## 2017-09-28 ENCOUNTER — HOSPITAL ENCOUNTER (OUTPATIENT)
Dept: CARDIAC CATH/INVASIVE PROCEDURES | Age: 62
Discharge: HOME | End: 2017-09-14

## 2017-09-28 DIAGNOSIS — M47.27 LUMBOSACRAL RADICULOPATHY DUE TO DEGENERATIVE JOINT DISEASE OF SPINE: ICD-10-CM

## 2017-10-10 ENCOUNTER — OFFICE VISIT (OUTPATIENT)
Dept: PSYCHIATRY | Age: 62
End: 2017-10-10

## 2017-10-10 ENCOUNTER — OFFICE VISIT (OUTPATIENT)
Dept: PSYCHOLOGY | Age: 62
End: 2017-10-10

## 2017-10-10 VITALS
HEART RATE: 103 BPM | DIASTOLIC BLOOD PRESSURE: 64 MMHG | SYSTOLIC BLOOD PRESSURE: 102 MMHG | BODY MASS INDEX: 25.76 KG/M2 | WEIGHT: 170 LBS | HEIGHT: 68 IN

## 2017-10-10 DIAGNOSIS — F32.A DEPRESSIVE DISORDER: ICD-10-CM

## 2017-10-10 DIAGNOSIS — F41.1 GAD (GENERALIZED ANXIETY DISORDER): Primary | ICD-10-CM

## 2017-10-10 DIAGNOSIS — M79.7 FIBROMYALGIA: ICD-10-CM

## 2017-10-10 LAB
AMPHETAMINE SCREEN, URINE: ABNORMAL
ANION GAP SERPL CALCULATED.3IONS-SCNC: 16 MMOL/L (ref 3–16)
BARBITURATE SCREEN URINE: ABNORMAL
BENZODIAZEPINE SCREEN, URINE: ABNORMAL
BUN BLDV-MCNC: 11 MG/DL (ref 7–20)
CALCIUM SERPL-MCNC: 9.8 MG/DL (ref 8.3–10.6)
CANNABINOID SCREEN URINE: ABNORMAL
CHLORIDE BLD-SCNC: 93 MMOL/L (ref 99–110)
CO2: 27 MMOL/L (ref 21–32)
COCAINE METABOLITE SCREEN URINE: ABNORMAL
CREAT SERPL-MCNC: 0.7 MG/DL (ref 0.6–1.2)
FOLATE: >20 NG/ML (ref 4.78–24.2)
GFR AFRICAN AMERICAN: >60
GFR NON-AFRICAN AMERICAN: >60
GLUCOSE BLD-MCNC: 84 MG/DL (ref 70–99)
HCT VFR BLD CALC: 44.2 % (ref 36–48)
HEMOGLOBIN: 15.1 G/DL (ref 12–16)
Lab: ABNORMAL
MCH RBC QN AUTO: 33.3 PG (ref 26–34)
MCHC RBC AUTO-ENTMCNC: 34.2 G/DL (ref 31–36)
MCV RBC AUTO: 97.4 FL (ref 80–100)
METHADONE SCREEN, URINE: ABNORMAL
OPIATE SCREEN URINE: ABNORMAL
OXYCODONE URINE: POSITIVE
PDW BLD-RTO: 12.9 % (ref 12.4–15.4)
PH UA: 6
PHENCYCLIDINE SCREEN URINE: ABNORMAL
PLATELET # BLD: 279 K/UL (ref 135–450)
PMV BLD AUTO: 8.4 FL (ref 5–10.5)
POTASSIUM SERPL-SCNC: 4 MMOL/L (ref 3.5–5.1)
PROPOXYPHENE SCREEN: ABNORMAL
RBC # BLD: 4.53 M/UL (ref 4–5.2)
SODIUM BLD-SCNC: 136 MMOL/L (ref 136–145)
TSH SERPL DL<=0.05 MIU/L-ACNC: 1.46 UIU/ML (ref 0.27–4.2)
VITAMIN B-12: 717 PG/ML (ref 211–911)
WBC # BLD: 10 K/UL (ref 4–11)

## 2017-10-10 PROCEDURE — 90832 PSYTX W PT 30 MINUTES: CPT | Performed by: PSYCHOLOGIST

## 2017-10-10 PROCEDURE — 99204 OFFICE O/P NEW MOD 45 MIN: CPT | Performed by: PSYCHIATRY & NEUROLOGY

## 2017-10-10 RX ORDER — CITALOPRAM 40 MG/1
40 TABLET ORAL DAILY
Qty: 30 TABLET | Refills: 3 | Status: SHIPPED | OUTPATIENT
Start: 2017-10-10 | End: 2017-12-05

## 2017-10-10 RX ORDER — LORAZEPAM 1 MG/1
1 TABLET ORAL
Qty: 70 TABLET | Refills: 0 | Status: SHIPPED | OUTPATIENT
Start: 2017-10-24 | End: 2017-11-07

## 2017-10-10 NOTE — PROGRESS NOTES
tablet 1    sulfacetamide (BLEPH-10) 10 % ophthalmic solution 5ml to infected eye while awake 5 mL 0    dicyclomine (BENTYL) 20 MG tablet TAKE 1 TABLET EVERY 6 HOURS 60 tablet 0    estradiol (ESTRACE) 2 MG tablet Take 1 tablet by mouth daily 90 tablet 3    cloNIDine (CATAPRES) 0.2 MG tablet Take 0.5 tablets by mouth every 6 hours 180 tablet 3    albuterol sulfate HFA (PROVENTIL HFA) 108 (90 Base) MCG/ACT inhaler Inhale 2 puffs into the lungs every 6 hours as needed for Wheezing 1 Inhaler 3    omeprazole (PRILOSEC) 40 MG delayed release capsule TAKE 1 CAPSULE BY MOUTH TWICE DAILY 180 capsule 3    cetirizine (ZYRTEC) 10 MG tablet Take 10 mg by mouth daily      fluticasone (FLONASE) 50 MCG/ACT nasal spray SHAKE LIQUID AND USE 2 SPRAYS IN EACH NOSTRIL EVERY DAY 48 g 3    citalopram (CELEXA) 10 MG tablet TAKE 1 TABLET BY MOUTH TWICE DAILY 60 tablet 5    triamterene-hydrochlorothiazide (MAXZIDE-25) 37.5-25 MG per tablet TAKE 1 TABLET BY MOUTH EVERY DAY 90 tablet 2    medroxyPROGESTERone (PROVERA) 2.5 MG tablet TAKE 1 &1/2 TABLETS BY MOUTH EVERY DAY (Patient taking differently: Take 1.25 mg by mouth 2 times daily ) 135 tablet 1     No current facility-administered medications for this visit. Social History:   Social History     Social History    Marital status:      Spouse name: N/A    Number of children: N/A    Years of education: N/A     Occupational History    Disabled      2010     Social History Main Topics    Smoking status: Current Every Day Smoker     Packs/day: 1.00     Years: 40.00     Types: Cigarettes    Smokeless tobacco: Not on file    Alcohol use 0.6 oz/week     1 Glasses of wine per week      Comment: occasionaly    Drug use: No    Sexual activity: Not Currently     Other Topics Concern    Not on file     Social History Narrative    No narrative on file       TOBACCO:   reports that she has been smoking Cigarettes. She has a 40.00 pack-year smoking history.  She does not have any smokeless tobacco history on file. ETOH:   reports that she drinks about 0.6 oz of alcohol per week . Family History:   Family History   Problem Relation Age of Onset    Heart Disease Mother     Other Mother     Stroke Father     Colon Cancer Father          A:  Ms. Luis Alfredo Khalil continues to struggle with chronic pain, depression, and anxiety. She has decreased engagement in pleasurable activities over the past few years due to her pain and this has negatively impacted her mood. Her effective coping skills are limited. Ms. Luis Alfredo Khalil has responded well to behavioral activation interventions and would likely benefit from ongoing psychoeducation, relaxation skills training, CBT for anxiety and CBT for Chronic Pain interventions, and behavioral activation interventions for anxiety and pain management.        PHQ Scores 9/26/2017 9/19/2017 7/3/2017 6/6/2017   PHQ2 Score 3 5 4 0   PHQ9 Score 10 14 17 0     Interpretation of Total Score Depression Severity: 1-4 = Minimal depression, 5-9 = Mild depression, 10-14 = Moderate depression, 15-19 = Moderately severe depression, 20-27 = Severe depression      Diagnosis:    Chronic Pain  Anxiety D/O NOS      Plan:  Pt interventions:  [place]-setting to identify pt's primary goals for [facility] visit / overall health and Supportive techniques, behavioral activation interventions, treatment planning       Pt Behavioral Change Plan:  Pt set a goal to 1) go to [employer] on Sunday and bring a pillow 2) schedule aquarium visit with your friend 3) return for f/u in 2 weeks

## 2017-10-10 NOTE — PROGRESS NOTES
Pedrito Contreras continues to struggle with chronic pain and anxiety. She has decreased engagement in pleasurable activities over the past few years due to her pain and this has negatively impacted her mood. She currently takes 6mg of Ativan a day to manage her anxiety, as well as Percocet for pain which are likely contributors to the fatigue she identifies as a primary barrier to engagement in activities. She is also currently taking a low dose of Celexa. It was recommended pt meet Dr. Jenny Bailon for medication consult re: anxiety management and she is agreeable. \"    Endorses h/o depression since chronic pain started in 90's. Episodic. Currently endorses mild depressed mood. Some anergia, amotivation, anhedonia. Denies H/H, SI/HI. During worse episodes, has prominent irritability, poor hygiene. Says since her  was taken off his Ativan Charley Fu is short with me, doesn't talk to me as much. \" Other stressors: doesn't like caring for her dog. Cannot identify others. Says she struggles with anxiety. \"I've always been real high strung. \" Says she gets stressed because her  is stressed. She frequently brings him up regarding being cut off of Ativan. Says she has not been anxious recently \"because I'm on a bunch of Ativan. \" Does seem somewhat sedated. C/o poor sleep. Early morning awakenings. Fragmented sleep. Limited by pain. Had a sleep study years ago, no sleep apnea. Urinates 3-4x/night. Denies h/o trauma, eating disorders, AVH or other psychosis. History obtained from patient and chart (confirmed by patient today). Past Psychiatric History:    Prior hospitalizations: Denies   Prior diagnoses: Chronic pain, Anxiety NOS   Prior medication trials/reactions to meds: Xanax, Wellbutrin, Celexa, Doxepin, remeron, Zyprexa, Ambien   Outpatient Treatment: Dr. Simon Redding.     Suicide Attempts: Denies      Substance Use History:   Nicotine:   History   Smoking Status    Current Every Day Smoker    Packs/day: 1.00    Years: 40.00    Types: Cigarettes   Smokeless Tobacco    Not on file      Alcohol: 1 drink/month. Formerly heavy   Illicits: Denies   Caffeine: 1 cup coffee/day   Rehabs/Complicated W/D: Denies, no DUI    Past Medical/Surgical History:   Past Medical History:   Diagnosis Date    Anxiety state, unspecified     Chronic back pain     Tried spinal injections    Depressive disorder, not elsewhere classified     Duodenal ulcer, unspecified as acute or chronic, without hemorrhage, perforation, or obstruction     Essential hypertension 2/22/2016    Fibromyalgia     GERD (gastroesophageal reflux disease)     Sleep disorder      Past Surgical History:   Procedure Laterality Date    ABDOMINAL ADHESION SURGERY      CARDIOVASCULAR STRESS TEST  02/2016    negative    CHOLECYSTECTOMY      COLONOSCOPY  9/13    negative    HYSTERECTOMY      TONSILLECTOMY      UPPER GASTROINTESTINAL ENDOSCOPY  9/13    small paraesoph hernia          PCP: Joaquin Upton MD      Social/Developmental History:    Developmental: Father had brain tumor when she was young   Marital: Twice. Currently 20 years   Children: DEnies   Family:    Housing: Lives with    Occupation/Income: SSDI. Formerly nurse   Education:              [employer] day:    Legal hx: Denies   Abuse hx:   Violence hx:   Access to firearms: No    Family History:    Medical:  Family History   Problem Relation Age of Onset    Heart Disease Mother     Other Mother     Stroke Father     Colon Cancer Father       Psychiatric: Denies   History of completed suicide: Denies    Allergies:    Allergies   Allergen Reactions    Cipro Xr      N/V    Macrodantin [Nitrofurantoin]     Morphine      headache         Current Medications:   Current Outpatient Prescriptions   Medication Sig Dispense Refill    LORazepam (ATIVAN) 1 MG tablet TAKE 1 TABLET BY MOUTH EVERY 4 HOURS AS NEEDED 180 tablet 1    tiZANidine (ZANAFLEX) 4 MG tablet TAKE 1 TABLET BY MOUTH EVERY 8 HOURS AS NEEDED FOR MUSCLE SPASM 270 tablet 1    sulfacetamide (BLEPH-10) 10 % ophthalmic solution 5ml to infected eye while awake 5 mL 0    dicyclomine (BENTYL) 20 MG tablet TAKE 1 TABLET EVERY 6 HOURS 60 tablet 0    estradiol (ESTRACE) 2 MG tablet Take 1 tablet by mouth daily 90 tablet 3    cloNIDine (CATAPRES) 0.2 MG tablet Take 0.1 mg by mouth every 8 hours  180 tablet 3    albuterol sulfate HFA (PROVENTIL HFA) 108 (90 Base) MCG/ACT inhaler Inhale 2 puffs into the lungs every 6 hours as needed for Wheezing 1 Inhaler 3    omeprazole (PRILOSEC) 40 MG delayed release capsule TAKE 1 CAPSULE BY MOUTH TWICE DAILY 180 capsule 3    cetirizine (ZYRTEC) 10 MG tablet Take 10 mg by mouth daily      fluticasone (FLONASE) 50 MCG/ACT nasal spray SHAKE LIQUID AND USE 2 SPRAYS IN EACH NOSTRIL EVERY DAY 48 g 3    citalopram (CELEXA) 10 MG tablet TAKE 1 TABLET BY MOUTH TWICE DAILY 60 tablet 5    triamterene-hydrochlorothiazide (MAXZIDE-25) 37.5-25 MG per tablet TAKE 1 TABLET BY MOUTH EVERY DAY 90 tablet 2    medroxyPROGESTERone (PROVERA) 2.5 MG tablet TAKE 1 &1/2 TABLETS BY MOUTH EVERY DAY (Patient taking differently: Take 1.25 mg by mouth 2 times daily ) 135 tablet 1     No current facility-administered medications for this visit. OBJECTIVE:  Vitals:   Vitals:    10/10/17 1015   BP: 102/64   Pulse: 103     Wt Readings from Last 3 Encounters:   10/10/17 170 lb (77.1 kg)   09/06/17 172 lb (78 kg)   08/22/17 169 lb (76.7 kg)           ROS: Denies trouble with fever, rash, headache, vision changes, chest pain, shortness of breath, nausea, extremity pain, weakness, dysuria.      Mental Status Exam:     Appearance    alert, cooperative  Muscle strength/tone: no atrophy or abnormal movements, anti-gravity  Gait/station: normal, anti-gravity  Speech    spontaneous, normal rate, normal volume and well articulated  Mood    euthymic  Affect    normal affect Congruent to thought content and mood  Thought Content    intact, no delusions voiced  Thought Process    linear, goal directed and coherent   Associations    logical connections  Perceptions: denies AH/VH, does not appear preoccupied with the internal environment  Insight    Fair  Judgment    Intact  Orientation    oriented to person, place, time, and general circumstances  Memory    recent and remote memory intact  Attention/Concentration    intact  Ability to understand instructions Yes  Ability to respond meaningfully Yes  SI:   no suicidal ideation  HI: Denies HI    Labs:     Lab Results   Component Value Date     09/23/2016    K 3.7 09/23/2016    CL 90 09/23/2016    CO2 29 09/23/2016    BUN 11 09/23/2016    CREATININE 0.7 09/23/2016    GLUCOSE 98 09/23/2016    CALCIUM 8.7 09/23/2016      Lab Results   Component Value Date    WBC 8.6 02/23/2016    HGB 14.7 02/23/2016    HCT 44.2 02/23/2016    MCV 94.6 02/23/2016     02/23/2016     Lab Results   Component Value Date    COLORU YELLOW 12/05/2012    NITRU NEGATIVE 12/05/2012    GLUCOSEU NEGATIVE 12/05/2012    KETUA NEGATIVE 12/05/2012    BILIRUBINUR NEGATIVE 12/05/2012    BILIRUBINUR n 01/04/2011     No results found for: LABA1C  No results found for: EAG  Lab Results   Component Value Date    CHOL 197 09/23/2016    CHOL 277 (H) 12/05/2012     Lab Results   Component Value Date    TRIG 217 (H) 09/23/2016    TRIG 147 12/05/2012     Lab Results   Component Value Date    HDL 56 09/23/2016    HDL 51 12/05/2012     Lab Results   Component Value Date    LDLCALC 98 09/23/2016    LDLCALC 197 (H) 12/05/2012     Lab Results   Component Value Date    LABVLDL 43 09/23/2016     Lab Results   Component Value Date    CHOLHDLRATIO 5.4 (H) 12/05/2012     Lab Results   Component Value Date    TSH 1.50 12/05/2012     No results found for: [id number]  No results found for: [id number]  No results found for: FOLATE        Imaging: no head imaging on file    Axis I  KEVIN, Mood Disorder NOS, chronic pain    Axis II: No diagnosis    Axis III Diagnosis Date    Anxiety state, unspecified     Chronic back pain     Tried spinal injections    Depressive disorder, not elsewhere classified     Duodenal ulcer, unspecified as acute or chronic, without hemorrhage, perforation, or obstruction     Essential hypertension 2/22/2016    Fibromyalgia     GERD (gastroesophageal reflux disease)     Sleep disorder       Active Problems:    * No active hospital problems. *       Axis IV  Problems with primary support group and Problems related to the social environment    ASSESSMENT AND PLAN      1. Safety: NO Imminent risk of danger to/self/others based on the factors considered below. Appropriate for outpatient level of care. Safety plan includes: 911, PES, hotlines, and interventions discussed today. Risk factors: Age <25 or >55,  chronic pain or medical illness  Protective factors: female gender, denies depression, denies suicidal ideation, does not have lethal plan, does not have access to guns or weapons, patient is arnaud for safety, no prior suicide attempts, no family h/o suicide, no substance abuse, patient has social or family support, no active psychosis or cognitive dysfunction, already has outpatient services in place, compliant with recommended medications, and patient is future oriented. 2. Psychiatric: overly sedated on 6mg ativan/day. H/o anxiety and depression and suspect these may worsen after remove Ativan. Pt is on-board with plan to taper off and is aware of the risks of taking with her opiate including death.   -Encouraging smoking cessation. She sometimes smokes at night when she can't sleep. -INCREASE Celexa to 40mg qAM   -Can consider augment with Abilify or others down the road. -DECREASE Ativan to 5mg/day. Per fill dates, she should have #72 left at home and knows to start 5mg/day today. Will send new rx to be filled 10/24 for #70 (2 weeks worth) to pharmacy. At next appt, should be on track for 30d supply of 4mg/day. Plan is to taper down by 1mg/day/month. Can increase taper speed if needed.  -Labs: reviewed in Epic, ordered  -Continue therapy with Dr. Pro Benjamin reviewed, c/w history  -R/b/se/a d/w pt who consents. 3. Medical  -Following with Estela Silva MD    4. Substance   -No active issues. 5. RTC - 4 weeks    Diana Blevins M.D.   Psychiatrist

## 2017-10-12 ENCOUNTER — OFFICE VISIT (OUTPATIENT)
Dept: PAIN MANAGEMENT | Age: 62
End: 2017-10-12

## 2017-10-12 VITALS
BODY MASS INDEX: 26.4 KG/M2 | HEART RATE: 110 BPM | WEIGHT: 173.6 LBS | DIASTOLIC BLOOD PRESSURE: 86 MMHG | SYSTOLIC BLOOD PRESSURE: 139 MMHG

## 2017-10-12 DIAGNOSIS — F32.A DEPRESSIVE DISORDER: ICD-10-CM

## 2017-10-12 DIAGNOSIS — F41.1 GAD (GENERALIZED ANXIETY DISORDER): Primary | ICD-10-CM

## 2017-10-12 DIAGNOSIS — M47.27 LUMBOSACRAL SPONDYLOSIS WITH RADICULOPATHY: ICD-10-CM

## 2017-10-12 DIAGNOSIS — G89.4 CHRONIC PAIN SYNDROME: ICD-10-CM

## 2017-10-12 DIAGNOSIS — M46.1 BILATERAL SACROILIITIS (HCC): Primary | ICD-10-CM

## 2017-10-12 PROCEDURE — 99213 OFFICE O/P EST LOW 20 MIN: CPT | Performed by: ANESTHESIOLOGY

## 2017-10-12 RX ORDER — OXYCODONE HYDROCHLORIDE AND ACETAMINOPHEN 5; 325 MG/1; MG/1
1 TABLET ORAL 2 TIMES DAILY
COMMUNITY
End: 2017-10-12 | Stop reason: SDUPTHER

## 2017-10-12 RX ORDER — OXYCODONE HYDROCHLORIDE AND ACETAMINOPHEN 5; 325 MG/1; MG/1
1 TABLET ORAL 2 TIMES DAILY PRN
Qty: 60 TABLET | Refills: 0 | Status: SHIPPED | OUTPATIENT
Start: 2017-10-24 | End: 2018-01-02

## 2017-10-12 RX ORDER — MELOXICAM 15 MG/1
15 TABLET ORAL DAILY
Qty: 30 TABLET | Refills: 0 | Status: SHIPPED | OUTPATIENT
Start: 2017-10-12 | End: 2017-11-07 | Stop reason: SDUPTHER

## 2017-10-12 ASSESSMENT — ENCOUNTER SYMPTOMS
NAUSEA: 0
EYE REDNESS: 0
EYE PAIN: 0
WHEEZING: 0
COUGH: 0
BACK PAIN: 1
VOMITING: 0
HEARTBURN: 0
ORTHOPNEA: 0
SHORTNESS OF BREATH: 0
CONSTIPATION: 0
EYE DISCHARGE: 0
ABDOMINAL PAIN: 0
HEMOPTYSIS: 0

## 2017-10-12 NOTE — PROGRESS NOTES
L5-S1 CHARISSE 9/14/2017; she has repeat on 10/26/2017. She would like to know if you can change level due to the Lumbar pain improved. Percocet 2 per day not enough; three was perfect.

## 2017-10-12 NOTE — PROGRESS NOTES
[facility]  [address], [place], [address]  [phone] (E)  [phone] (f)    10/12/17        Sera Weaver  1955      Mark Anthony Clemente MD      Chief Complain:   Chief Complaint   Patient presents with    Lower Back Pain     The patient complains of more sacral/ groin pain since the procedure. She states 95 % of the Lumbar pain is better. History of Present Illness   HPI    Chronic low back pain >neck pain for the past 2 years - no acute changes in bladder/bowel. Pain is constant in lower back, achy, sharp with occasional radiation to legs. Pain worse with prolonged sitting, standing, walking and helped by rest and pain medication. Failed PT and spinal injections (facets in 10/2016, 05/02/2017) through another provider (Dr Gisell Green). Reports moderate help from lumbar CHARISSE last month, but buttock pain persisting. RED FLAG symptoms (Symptoms may include, but not limited to, acute trauma, fever, drug use, malignancy, weakness, perianal numbness, bladder/bowel changes) since last visit - No    Changes since last visit:   Seen Psych (Dr. Jenny Bailon) - medications adjusted; follows with  (Dr. Simon Redding). Back surgery postponed (Dr. Dorinda Cabrera).       Past Medical History:   Diagnosis Date    Anxiety state, unspecified     Chronic back pain     Tried spinal injections    Depressive disorder, not elsewhere classified     Duodenal ulcer, unspecified as acute or chronic, without hemorrhage, perforation, or obstruction     Essential hypertension 2/22/2016    Fibromyalgia     GERD (gastroesophageal reflux disease)     Sleep disorder        Past Surgical History:   Procedure Laterality Date    ABDOMINAL ADHESION SURGERY      CARDIOVASCULAR STRESS TEST  02/2016    negative    CHOLECYSTECTOMY      COLONOSCOPY  9/13    negative    HYSTERECTOMY      TONSILLECTOMY      UPPER GASTROINTESTINAL ENDOSCOPY  9/13    small paraesoph hernia        Allergies Allergen Reactions    Cipro Xr      N/V    Macrodantin [Nitrofurantoin]     Morphine      headache       Current Outpatient Prescriptions   Medication Sig Dispense Refill    [START ON 10/24/2017] oxyCODONE-acetaminophen (PERCOCET) 5-325 MG per tablet Take 1 tablet by mouth 2 times daily as needed (severe pain) .  Earliest Fill Date: 10/24/17 60 tablet 0    meloxicam (MOBIC) 15 MG tablet Take 1 tablet by mouth daily With food 30 tablet 0    [START ON 10/24/2017] LORazepam (ATIVAN) 1 MG tablet Take 1 tablet by mouth 5 times daily As needed for anxiety 70 tablet 0    citalopram (CELEXA) 40 MG tablet Take 1 tablet by mouth daily 30 tablet 3    LORazepam (ATIVAN) 1 MG tablet TAKE 1 TABLET BY MOUTH EVERY 4 HOURS AS NEEDED 180 tablet 1    tiZANidine (ZANAFLEX) 4 MG tablet TAKE 1 TABLET BY MOUTH EVERY 8 HOURS AS NEEDED FOR MUSCLE SPASM 270 tablet 1    sulfacetamide (BLEPH-10) 10 % ophthalmic solution 5ml to infected eye while awake 5 mL 0    dicyclomine (BENTYL) 20 MG tablet TAKE 1 TABLET EVERY 6 HOURS 60 tablet 0    estradiol (ESTRACE) 2 MG tablet Take 1 tablet by mouth daily 90 tablet 3    cloNIDine (CATAPRES) 0.2 MG tablet Take 0.1 mg by mouth every 8 hours  180 tablet 3    albuterol sulfate HFA (PROVENTIL HFA) 108 (90 Base) MCG/ACT inhaler Inhale 2 puffs into the lungs every 6 hours as needed for Wheezing 1 Inhaler 3    omeprazole (PRILOSEC) 40 MG delayed release capsule TAKE 1 CAPSULE BY MOUTH TWICE DAILY 180 capsule 3    cetirizine (ZYRTEC) 10 MG tablet Take 10 mg by mouth daily      fluticasone (FLONASE) 50 MCG/ACT nasal spray SHAKE LIQUID AND USE 2 SPRAYS IN EACH NOSTRIL EVERY DAY 48 g 3    triamterene-hydrochlorothiazide (MAXZIDE-25) 37.5-25 MG per tablet TAKE 1 TABLET BY MOUTH EVERY DAY 90 tablet 2    medroxyPROGESTERone (PROVERA) 2.5 MG tablet TAKE 1 &1/2 TABLETS BY MOUTH EVERY DAY (Patient taking differently: Take 1.25 mg by mouth 2 times daily ) 135 tablet 1     No current 9.6 oz (78.7 kg)       Body mass index is 26.4 kg/m². Pain Score:   9 /10    Medication Effects: Analgesia:     Average level of pain for the past month = 9/10    Percentage of pain relief = 50%    Activities Improved:    Physical functioning = 50%    Family relationships = 50%    Social relationships = 50%    Mood = 50%    Sleep = 50%    Overall functioning = 50%    Adverse Effects: Any adverse effects: No    Type/Severity of side effect: None   Aberrant Behavior:    Requests for frequent early refills: No    Increased dose without authorization: No    Reports lost or stolen prescriptions: No    Attempts to obtain prescriptions from other providers: No    Use of pain medication in response to situational stressor: Yes     Increasingly unkempt or impaired: No    Negative mood changes: No    Abusing alcohol or illicit drugs: No    Appears intoxicated: No    Other: NA    Benefit from Other Treatments (since last visit):      Physical Therapy: No / N/A   Counseling: Yes / ongoing   Injections: Yes / Benefit 70% from lumbar CHARISSE%      Compliance Monitoring:      ORT Score =  1   Current MEDD = 15   OARRS:    Checked today: Yes    Adverse report: No   UDS:    Date of last UDS: 10/10/2017    Results appropriate: Yes      Assessment:    1. Bilateral sacroiliitis (HCC)  Chronic.  - meloxicam (MOBIC) 15 MG tablet; Take 1 tablet by mouth daily With food  Dispense: 30 tablet; Refill: 0    2. Lumbosacral spondylosis with radiculopathy  Chronic.  - meloxicam (MOBIC) 15 MG tablet; Take 1 tablet by mouth daily With food  Dispense: 30 tablet; Refill: 0    3. Chronic pain syndrome  Chronic. ORT score = Low; risk factors - depression, fibromyalgia, concurrent use of Benzodiazepine    Plan:     1. Chronic low back pain with occasional tingling down the legs; no focal changes. 2. Reports moderate help from lumbar CHARISSE, but pain in buttock area persisting.   3. Continue meloxicam and Percocet 5 mg PRN; continue to follow with

## 2017-10-12 NOTE — PATIENT INSTRUCTIONS
1. Continue home exercises; I have provided meloxicam, tizanidine. 2. May use Percocet for severe pain as discussed. 3. You may return for sacroiliac joint injections. Patient Education        Sacroiliac Joint Pain: Care Instructions  Your Care Instructions    The sacroiliac joints connect the spine and each side of the pelvis. These joints bear the weight and stress of your torso. This makes them easy to injure. Injury or overuse of these joints may cause low back pain. Stress on these joints can cause joint pain. Sacroiliac joint pain is more common in pregnant women. Certain kinds of arthritis also may cause this type of joint pain. Home treatment may help you feel better. So can avoiding activities that stress your back. Your doctor also may recommend physical therapy. This may include doing exercises and stretches to help with pain. You may also learn to use good posture. Follow-up care is a key part of your treatment and safety. Be sure to make and go to all appointments, and call your doctor if you are having problems. It's also a good idea to know your test results and keep a list of the medicines you take. How can you care for yourself at home? · Ask your doctor about light exercises that may help your back pain. Try to do light activity throughout the day. But make sure to take rests as needed. Find a comfortable position for rest, but don't stay in one position for too long. Avoid activities that cause pain. · To apply heat, put a warm water bottle, a heating pad set on low, or a warm cloth on your back. Do not go to sleep with a heating pad on your skin. · Put ice or a cold pack on your back for 10 to 20 minutes at a time. Put a thin cloth between the ice and your skin. · If the doctor gave you a prescription medicine for pain, take it as prescribed.   · If you are not taking a prescription pain medicine, ask your doctor if you can take an over-the-counter pain medicine, such as

## 2017-10-24 ENCOUNTER — OFFICE VISIT (OUTPATIENT)
Dept: PSYCHOLOGY | Age: 62
End: 2017-10-24

## 2017-10-24 DIAGNOSIS — F41.1 GAD (GENERALIZED ANXIETY DISORDER): Primary | ICD-10-CM

## 2017-10-24 DIAGNOSIS — G89.4 CHRONIC PAIN SYNDROME: ICD-10-CM

## 2017-10-24 PROCEDURE — 90832 PSYTX W PT 30 MINUTES: CPT | Performed by: PSYCHOLOGIST

## 2017-10-24 ASSESSMENT — PATIENT HEALTH QUESTIONNAIRE - PHQ9
7. TROUBLE CONCENTRATING ON THINGS, SUCH AS READING THE NEWSPAPER OR WATCHING TELEVISION: 0
4. FEELING TIRED OR HAVING LITTLE ENERGY: 1
6. FEELING BAD ABOUT YOURSELF - OR THAT YOU ARE A FAILURE OR HAVE LET YOURSELF OR YOUR FAMILY DOWN: 0
5. POOR APPETITE OR OVEREATING: 0
SUM OF ALL RESPONSES TO PHQ QUESTIONS 1-9: 6
10. IF YOU CHECKED OFF ANY PROBLEMS, HOW DIFFICULT HAVE THESE PROBLEMS MADE IT FOR YOU TO DO YOUR WORK, TAKE CARE OF THINGS AT HOME, OR GET ALONG WITH OTHER PEOPLE: 1
1. LITTLE INTEREST OR PLEASURE IN DOING THINGS: 1
3. TROUBLE FALLING OR STAYING ASLEEP: 3
SUM OF ALL RESPONSES TO PHQ9 QUESTIONS 1 & 2: 2
2. FEELING DOWN, DEPRESSED OR HOPELESS: 1
9. THOUGHTS THAT YOU WOULD BE BETTER OFF DEAD, OR OF HURTING YOURSELF: 0
8. MOVING OR SPEAKING SO SLOWLY THAT OTHER PEOPLE COULD HAVE NOTICED. OR THE OPPOSITE, BEING SO FIGETY OR RESTLESS THAT YOU HAVE BEEN MOVING AROUND A LOT MORE THAN USUAL: 0

## 2017-11-02 ENCOUNTER — HOSPITAL ENCOUNTER (OUTPATIENT)
Dept: CARDIAC CATH/INVASIVE PROCEDURES | Age: 62
Discharge: HOME | End: 2017-11-02
Attending: ANESTHESIOLOGY | Admitting: ANESTHESIOLOGY

## 2017-11-02 DIAGNOSIS — M47.27 LUMBOSACRAL RADICULOPATHY DUE TO DEGENERATIVE JOINT DISEASE OF SPINE: ICD-10-CM

## 2017-11-02 NOTE — H&P
Cc: Buttock pain    Past Medical History:   Diagnosis Date    Anxiety state, unspecified     Chronic back pain     Tried spinal injections    Depressive disorder, not elsewhere classified     Duodenal ulcer, unspecified as acute or chronic, without hemorrhage, perforation, or obstruction     Essential hypertension 2/22/2016    Fibromyalgia     GERD (gastroesophageal reflux disease)     Sleep disorder      Past Surgical History:   Procedure Laterality Date    ABDOMINAL ADHESION SURGERY      CARDIOVASCULAR STRESS TEST  02/2016    negative    CHOLECYSTECTOMY      COLONOSCOPY  9/13    negative    HYSTERECTOMY      TONSILLECTOMY      UPPER GASTROINTESTINAL ENDOSCOPY  9/13    small paraesoph hernia      Allergies   Allergen Reactions    Cipro Xr      N/V    Macrodantin [Nitrofurantoin]     Morphine      headache     Current Outpatient Prescriptions on File Prior to Encounter   Medication Sig Dispense Refill    dicyclomine (BENTYL) 20 MG tablet TAKE 1 TABLET EVERY 6 HOURS 60 tablet 2    oxyCODONE-acetaminophen (PERCOCET) 5-325 MG per tablet Take 1 tablet by mouth 2 times daily as needed (severe pain) .  Earliest Fill Date: 10/24/17 60 tablet 0    meloxicam (MOBIC) 15 MG tablet Take 1 tablet by mouth daily With food 30 tablet 0    LORazepam (ATIVAN) 1 MG tablet Take 1 tablet by mouth 5 times daily As needed for anxiety 70 tablet 0    citalopram (CELEXA) 40 MG tablet Take 1 tablet by mouth daily 30 tablet 3    LORazepam (ATIVAN) 1 MG tablet TAKE 1 TABLET BY MOUTH EVERY 4 HOURS AS NEEDED 180 tablet 1    tiZANidine (ZANAFLEX) 4 MG tablet TAKE 1 TABLET BY MOUTH EVERY 8 HOURS AS NEEDED FOR MUSCLE SPASM 270 tablet 1    sulfacetamide (BLEPH-10) 10 % ophthalmic solution 5ml to infected eye while awake 5 mL 0    estradiol (ESTRACE) 2 MG tablet Take 1 tablet by mouth daily 90 tablet 3    cloNIDine (CATAPRES) 0.2 MG tablet Take 0.1 mg by mouth every 8 hours  180 tablet 3    albuterol sulfate HFA (PROVENTIL

## 2017-11-03 ENCOUNTER — TELEPHONE (OUTPATIENT)
Dept: INTERNAL MEDICINE | Age: 62
End: 2017-11-03

## 2017-11-03 RX ORDER — AMOXICILLIN AND CLAVULANATE POTASSIUM 875; 125 MG/1; MG/1
1 TABLET, FILM COATED ORAL 2 TIMES DAILY
Qty: 20 TABLET | Refills: 0 | Status: SHIPPED | OUTPATIENT
Start: 2017-11-03 | End: 2017-11-13

## 2017-11-07 ENCOUNTER — OFFICE VISIT (OUTPATIENT)
Dept: PSYCHIATRY | Age: 62
End: 2017-11-07

## 2017-11-07 VITALS
BODY MASS INDEX: 25.54 KG/M2 | SYSTOLIC BLOOD PRESSURE: 138 MMHG | HEART RATE: 111 BPM | OXYGEN SATURATION: 98 % | RESPIRATION RATE: 16 BRPM | DIASTOLIC BLOOD PRESSURE: 80 MMHG | WEIGHT: 168 LBS

## 2017-11-07 DIAGNOSIS — F41.1 GAD (GENERALIZED ANXIETY DISORDER): ICD-10-CM

## 2017-11-07 DIAGNOSIS — M47.27 LUMBOSACRAL SPONDYLOSIS WITH RADICULOPATHY: ICD-10-CM

## 2017-11-07 DIAGNOSIS — F32.A DEPRESSIVE DISORDER: ICD-10-CM

## 2017-11-07 DIAGNOSIS — M46.1 BILATERAL SACROILIITIS (HCC): ICD-10-CM

## 2017-11-07 DIAGNOSIS — F41.1 GAD (GENERALIZED ANXIETY DISORDER): Primary | ICD-10-CM

## 2017-11-07 PROCEDURE — 99214 OFFICE O/P EST MOD 30 MIN: CPT | Performed by: PSYCHIATRY & NEUROLOGY

## 2017-11-07 RX ORDER — LORAZEPAM 1 MG/1
1 TABLET ORAL 4 TIMES DAILY PRN
Qty: 120 TABLET | Refills: 0 | Status: SHIPPED | OUTPATIENT
Start: 2017-11-07 | End: 2017-12-05 | Stop reason: SDUPTHER

## 2017-11-07 RX ORDER — MELOXICAM 15 MG/1
15 TABLET ORAL DAILY
Qty: 30 TABLET | Refills: 0 | Status: SHIPPED | OUTPATIENT
Start: 2017-11-07 | End: 2017-12-06 | Stop reason: SDUPTHER

## 2017-11-07 NOTE — PROGRESS NOTES
History   Problem Relation Age of Onset    Heart Disease Mother     Other Mother     Stroke Father     Colon Cancer Father       Psychiatric: Denies   History of completed suicide: Denies    Allergies: Allergies   Allergen Reactions    Cipro Xr      N/V    Macrodantin [Nitrofurantoin]     Morphine      headache         Current Medications:   Current Outpatient Prescriptions   Medication Sig Dispense Refill    amoxicillin-clavulanate (AUGMENTIN) 875-125 MG per tablet Take 1 tablet by mouth 2 times daily for 10 days 20 tablet 0    dicyclomine (BENTYL) 20 MG tablet TAKE 1 TABLET EVERY 6 HOURS 60 tablet 2    oxyCODONE-acetaminophen (PERCOCET) 5-325 MG per tablet Take 1 tablet by mouth 2 times daily as needed (severe pain) .  Earliest Fill Date: 10/24/17 60 tablet 0    meloxicam (MOBIC) 15 MG tablet Take 1 tablet by mouth daily With food 30 tablet 0    LORazepam (ATIVAN) 1 MG tablet Take 1 tablet by mouth 5 times daily As needed for anxiety 70 tablet 0    citalopram (CELEXA) 40 MG tablet Take 1 tablet by mouth daily 30 tablet 3    LORazepam (ATIVAN) 1 MG tablet TAKE 1 TABLET BY MOUTH EVERY 4 HOURS AS NEEDED 180 tablet 1    tiZANidine (ZANAFLEX) 4 MG tablet TAKE 1 TABLET BY MOUTH EVERY 8 HOURS AS NEEDED FOR MUSCLE SPASM 270 tablet 1    sulfacetamide (BLEPH-10) 10 % ophthalmic solution 5ml to infected eye while awake 5 mL 0    estradiol (ESTRACE) 2 MG tablet Take 1 tablet by mouth daily 90 tablet 3    cloNIDine (CATAPRES) 0.2 MG tablet Take 0.1 mg by mouth every 8 hours  180 tablet 3    albuterol sulfate HFA (PROVENTIL HFA) 108 (90 Base) MCG/ACT inhaler Inhale 2 puffs into the lungs every 6 hours as needed for Wheezing 1 Inhaler 3    omeprazole (PRILOSEC) 40 MG delayed release capsule TAKE 1 CAPSULE BY MOUTH TWICE DAILY 180 capsule 3    cetirizine (ZYRTEC) 10 MG tablet Take 10 mg by mouth daily      fluticasone (FLONASE) 50 MCG/ACT nasal spray SHAKE LIQUID AND USE 2 SPRAYS IN EACH NOSTRIL EVERY DAY 97.4 10/10/2017     10/10/2017     Lab Results   Component Value Date    COLORU YELLOW 12/05/2012    NITRU NEGATIVE 12/05/2012    GLUCOSEU NEGATIVE 12/05/2012    KETUA NEGATIVE 12/05/2012    BILIRUBINUR NEGATIVE 12/05/2012    BILIRUBINUR n 01/04/2011     No results found for: LABA1C  No results found for: EAG  Lab Results   Component Value Date    CHOL 197 09/23/2016    CHOL 277 (H) 12/05/2012     Lab Results   Component Value Date    TRIG 217 (H) 09/23/2016    TRIG 147 12/05/2012     Lab Results   Component Value Date    HDL 56 09/23/2016    HDL 51 12/05/2012     Lab Results   Component Value Date    LDLCALC 98 09/23/2016    LDLCALC 197 (H) 12/05/2012     Lab Results   Component Value Date    LABVLDL 43 09/23/2016     Lab Results   Component Value Date    CHOLHDLRATIO 5.4 (H) 12/05/2012     Lab Results   Component Value Date    TSH 1.46 10/10/2017     No results found for: BXEIDLT8E7  Lab Results   Component Value Date    LWPDILQJ43 717 10/10/2017     Lab Results   Component Value Date    FOLATE >20.00 10/10/2017           Imaging: no head imaging on file    Axis I  KEVIN, Mood Disorder NOS, chronic pain    Axis II: No diagnosis    Axis III       Diagnosis Date    Anxiety state, unspecified     Chronic back pain     Tried spinal injections    Depressive disorder, not elsewhere classified     Duodenal ulcer, unspecified as acute or chronic, without hemorrhage, perforation, or obstruction     Essential hypertension 2/22/2016    Fibromyalgia     GERD (gastroesophageal reflux disease)     Sleep disorder       Active Problems:    * No active hospital problems. *       Axis IV  Problems with primary support group and Problems related to the social environment    ASSESSMENT AND PLAN      1. Safety: NO Imminent risk of danger to/self/others based on the factors considered below. Appropriate for outpatient level of care. Safety plan includes: 911, PES, hotlines, and interventions discussed today.    Risk

## 2017-11-09 ENCOUNTER — TELEPHONE (OUTPATIENT)
Dept: PAIN MANAGEMENT | Age: 62
End: 2017-11-09

## 2017-11-09 NOTE — TELEPHONE ENCOUNTER
SI injection 11/2/17. Pt states that molxicam helped 50% then her injection helped the rest. Total with both medications she feels about 90% better.  Please advise

## 2017-11-10 LAB
6-ACETYLMORPHINE: NOT DETECTED
7-AMINOCLONAZEPAM: NOT DETECTED
ALPHA-OH-ALPRAZOLAM: NOT DETECTED
ALPRAZOLAM: NOT DETECTED
AMPHETAMINE: NOT DETECTED
BARBITURATES: NOT DETECTED
BENZOYLECGONINE: NOT DETECTED
BUPRENORPHINE: NOT DETECTED
CARISOPRODOL: NOT DETECTED
CLONAZEPAM: NOT DETECTED
CODEINE: NOT DETECTED
CREATININE URINE: 88.8 MG/DL (ref 20–400)
DIAZEPAM: NOT DETECTED
EER PAIN MGT DRUG PANEL, HIGH RES/EMIT U: NORMAL
ETHYL GLUCURONIDE: NOT DETECTED
FENTANYL: NOT DETECTED
HYDROCODONE: NOT DETECTED
HYDROMORPHONE: NOT DETECTED
LORAZEPAM: PRESENT
MARIJUANA METABOLITE: NOT DETECTED
MDA: NOT DETECTED
MDEA: NOT DETECTED
MDMA URINE: NOT DETECTED
MEPERIDINE: NOT DETECTED
METHADONE: NOT DETECTED
METHAMPHETAMINE: NOT DETECTED
METHYLPHENIDATE: NOT DETECTED
MIDAZOLAM: NOT DETECTED
MORPHINE: NOT DETECTED
NORBUPRENORPHINE, FREE: NOT DETECTED
NORDIAZEPAM: NOT DETECTED
NORFENTANYL: NOT DETECTED
NORHYDROCODONE, URINE: NOT DETECTED
NOROXYCODONE: PRESENT
NOROXYMORPHONE, URINE: NOT DETECTED
OXAZEPAM: NOT DETECTED
OXYCODONE: NOT DETECTED
OXYMORPHONE: NOT DETECTED
PAIN MANAGEMENT DRUG PANEL: NORMAL
PCP: NOT DETECTED
PHENTERMINE: NOT DETECTED
PROPOXYPHENE: NOT DETECTED
TAPENTADOL, URINE: NOT DETECTED
TAPENTADOL-O-SULFATE, URINE: NOT DETECTED
TEMAZEPAM: NOT DETECTED
TRAMADOL: NOT DETECTED
ZOLPIDEM: NOT DETECTED

## 2017-11-16 ENCOUNTER — TELEPHONE (OUTPATIENT)
Dept: INTERNAL MEDICINE | Age: 62
End: 2017-11-16

## 2017-11-17 NOTE — TELEPHONE ENCOUNTER
REFERRAL COMPLETE     Certification Number: [id number] Status: Certified in Total Message: HMO MULTIPLE VISIT REFERRAL THE FIRST VISIT MUST OCCUR WITHIN 90 DAYS BALANCE WITHIN 1 YEAR OF EFFECTIVE DATE Expiration Date: 02/14/2018

## 2017-11-22 ENCOUNTER — OFFICE VISIT (OUTPATIENT)
Dept: PSYCHOLOGY | Age: 62
End: 2017-11-22

## 2017-11-22 ENCOUNTER — OFFICE VISIT (OUTPATIENT)
Dept: INTERNAL MEDICINE | Age: 62
End: 2017-11-22

## 2017-11-22 VITALS
SYSTOLIC BLOOD PRESSURE: 138 MMHG | DIASTOLIC BLOOD PRESSURE: 90 MMHG | TEMPERATURE: 97.9 F | WEIGHT: 173 LBS | HEIGHT: 68 IN | BODY MASS INDEX: 26.22 KG/M2

## 2017-11-22 DIAGNOSIS — M54.5 CHRONIC LOW BACK PAIN, UNSPECIFIED BACK PAIN LATERALITY, WITH SCIATICA PRESENCE UNSPECIFIED: ICD-10-CM

## 2017-11-22 DIAGNOSIS — I10 ESSENTIAL HYPERTENSION: ICD-10-CM

## 2017-11-22 DIAGNOSIS — F51.01 PRIMARY INSOMNIA: ICD-10-CM

## 2017-11-22 DIAGNOSIS — F41.1 GAD (GENERALIZED ANXIETY DISORDER): ICD-10-CM

## 2017-11-22 DIAGNOSIS — F41.1 GAD (GENERALIZED ANXIETY DISORDER): Primary | ICD-10-CM

## 2017-11-22 DIAGNOSIS — G89.29 CHRONIC LOW BACK PAIN, UNSPECIFIED BACK PAIN LATERALITY, WITH SCIATICA PRESENCE UNSPECIFIED: ICD-10-CM

## 2017-11-22 DIAGNOSIS — J06.9 URI, ACUTE: ICD-10-CM

## 2017-11-22 DIAGNOSIS — E78.5 HYPERLIPIDEMIA, UNSPECIFIED HYPERLIPIDEMIA TYPE: ICD-10-CM

## 2017-11-22 DIAGNOSIS — Z00.00 PREVENTATIVE HEALTH CARE: Primary | ICD-10-CM

## 2017-11-22 PROBLEM — Z01.818 PREOP EXAMINATION: Status: RESOLVED | Noted: 2017-08-22 | Resolved: 2017-11-22

## 2017-11-22 PROCEDURE — 90832 PSYTX W PT 30 MINUTES: CPT | Performed by: PSYCHOLOGIST

## 2017-11-22 PROCEDURE — 99213 OFFICE O/P EST LOW 20 MIN: CPT | Performed by: INTERNAL MEDICINE

## 2017-11-22 RX ORDER — DOXYCYCLINE HYCLATE 100 MG/1
100 CAPSULE ORAL 2 TIMES DAILY
Qty: 20 CAPSULE | Refills: 0 | Status: SHIPPED | OUTPATIENT
Start: 2017-11-22 | End: 2018-01-15

## 2017-11-22 ASSESSMENT — ENCOUNTER SYMPTOMS
GASTROINTESTINAL NEGATIVE: 1
RESPIRATORY NEGATIVE: 1
COUGH: 0
SORE THROAT: 1
EYE DISCHARGE: 0
RHINORRHEA: 1
SINUS PRESSURE: 1

## 2017-11-22 ASSESSMENT — PATIENT HEALTH QUESTIONNAIRE - PHQ9
4. FEELING TIRED OR HAVING LITTLE ENERGY: 2
2. FEELING DOWN, DEPRESSED OR HOPELESS: 0
1. LITTLE INTEREST OR PLEASURE IN DOING THINGS: 2
5. POOR APPETITE OR OVEREATING: 2
SUM OF ALL RESPONSES TO PHQ9 QUESTIONS 1 & 2: 2
SUM OF ALL RESPONSES TO PHQ QUESTIONS 1-9: 8
7. TROUBLE CONCENTRATING ON THINGS, SUCH AS READING THE NEWSPAPER OR WATCHING TELEVISION: 0
6. FEELING BAD ABOUT YOURSELF - OR THAT YOU ARE A FAILURE OR HAVE LET YOURSELF OR YOUR FAMILY DOWN: 0
9. THOUGHTS THAT YOU WOULD BE BETTER OFF DEAD, OR OF HURTING YOURSELF: 0
10. IF YOU CHECKED OFF ANY PROBLEMS, HOW DIFFICULT HAVE THESE PROBLEMS MADE IT FOR YOU TO DO YOUR WORK, TAKE CARE OF THINGS AT HOME, OR GET ALONG WITH OTHER PEOPLE: 1
3. TROUBLE FALLING OR STAYING ASLEEP: 2
8. MOVING OR SPEAKING SO SLOWLY THAT OTHER PEOPLE COULD HAVE NOTICED. OR THE OPPOSITE, BEING SO FIGETY OR RESTLESS THAT YOU HAVE BEEN MOVING AROUND A LOT MORE THAN USUAL: 0

## 2017-11-22 NOTE — PATIENT INSTRUCTIONS
take the bath about 2 hours of bedtime. Bedroom Environment   Your bedroom should have a moderate temperature and be quiet and dark. Noises can be masked with background white noise (e.g., the noise of a fan) or with earplugs. Bedrooms may be darkened with blackout shades, or sleep masks can be worn. Eating   A light bedtime snack, such a glass of warm milk, cheese, or a bowl of cereal can promote sleep. Avoid snacks in the middle of the night because awakening may become associated with hunger. Avoid Naps   The sleep you obtain during the day takes away from the amount of sleep you need that night. Naps can, however, be helpful (for your mood) under certain conditions. Limit the nap to 45 minutes and don't nap after 4:00 p.m. Allow Yourself at Least an Hour Before Bedtime to Unwind   Find what works for you to wind down. Engage in calming, relaxing activities. Don't engage in an activity that will promote drowsiness before it's time for bed. Regular Sleep Schedule   Keep a regular time each day, 7 days a week, to get out of bed. Keeping a regular waking time helps set your circadian rhythm so that your body learns to sleep at the desired time. Set a Reasonable Bedtime and Arising Time and Stick to Them   Set the alarm clock and get out of bed at the same time each morning, weekdays and weekends, regardless of your bedtime or the amount of sleep you obtained on the previous night.

## 2017-11-22 NOTE — PROGRESS NOTES
SUBJECTIVE:    Patient ID: Jordan Montaño is an 65 y.o. female. HPI: Patient here today for the f/u of chronic problems -- see Problem List and associated comments. New issues or complaints include (also see Assessment for more details):  Here with runny nose and sinus pressure and drainage. Chronic pain in her back is being dealt with by pain clinic and epidural steroid injections. She is seeing psychiatry for her chronic anxiety and feels stable and controlled. Review of Systems   Constitutional: Negative for chills and fever. HENT: Positive for congestion, postnasal drip, rhinorrhea, sinus pressure and sore throat. Eyes: Negative for discharge. Respiratory: Negative. Negative for cough. Gastrointestinal: Negative. Genitourinary: Negative. Psychiatric/Behavioral: Positive for sleep disturbance. Negative for dysphoric mood. The patient is nervous/anxious. OBJECTIVE:    BP (!) 138/90 (Site: Left Arm, Position: Sitting, Cuff Size: Medium Adult)   Temp 97.9 °F (36.6 °C) (Oral)   Ht 5' 8\" (1.727 m)   Wt 173 lb (78.5 kg)   BMI 26.30 kg/m²      Physical Exam   Constitutional: She is oriented to person, place, and time. She appears well-developed and well-nourished. HENT:   Nose: Mucosal edema and rhinorrhea present. Mouth/Throat: Oropharynx is clear and moist. No oropharyngeal exudate. Eyes: Right eye exhibits no discharge. Left eye exhibits no discharge. Neck: No JVD present. Cardiovascular: Normal rate and regular rhythm. Pulmonary/Chest: Effort normal. No stridor. No respiratory distress. Lymphadenopathy:     She has no cervical adenopathy. Neurological: She is alert and oriented to person, place, and time. Skin: She is not diaphoretic. No pallor. Psychiatric: She has a normal mood and affect. Her behavior is normal. Judgment and thought content normal.       ASSESSMENT:       Encounter Diagnoses   Name Primary?     Preventative health care Yes    Essential hypertension     Hyperlipidemia, unspecified hyperlipidemia type     KEVIN (generalized anxiety disorder)     Chronic low back pain, unspecified back pain laterality, with sciatica presence unspecified     URI, acute        KEVIN (generalized anxiety disorder)  Doing well on current regimen of citalopram 20 mg a day and Ativan 1 mg 4 times a day. Essential hypertension  BP OK    Hyperlipidemia  Nonfasting lipids today    Chronic back pain  Status post CHARISSE and use of meloxicam and doing wellper pain clinic    URI, acute  Probably viral. Running nose and congestion. Scratchy throat. He has lower respiratory infection couple weeks ago and was on Augmentin. Gave doxycycline prescription to keep on hand if symptoms progress. PLAN:  See ASSESSMENT for evaluation & PLAN    Orders Placed This Encounter   Procedures    CBC Auto Differential     Standing Status:   Future     Standing Expiration Date:   11/22/2018    Comprehensive Metabolic Panel     Standing Status:   Future     Standing Expiration Date:   11/22/2018    Lipid Panel     Standing Status:   Future     Standing Expiration Date:   11/22/2018     Order Specific Question:   Is Patient Fasting?/# of Hours     Answer:   yes - 8 hours    TSH WITH REFLEX TO FT4     Standing Status:   Future     Standing Expiration Date:   11/22/2018    MAGNESIUM     Standing Status:   Future     Standing Expiration Date:   11/22/2018       PSH, PMH, SH and FH reviewed and noted. Recent and past labs, tests and consults also reviewed. Recent or new meds also reviewed.

## 2017-11-22 NOTE — ASSESSMENT & PLAN NOTE
Probably viral. Running nose and congestion. Scratchy throat. He has lower respiratory infection couple weeks ago and was on Augmentin. Gave doxycycline prescription to keep on hand if symptoms progress.

## 2017-11-22 NOTE — PROGRESS NOTES
Concern    Not on file     Social History Narrative    No narrative on file       TOBACCO:   reports that she has been smoking Cigarettes. She has a 40.00 pack-year smoking history. She does not have any smokeless tobacco history on file. ETOH:   reports that she drinks about 0.6 oz of alcohol per week . Family History:   Family History   Problem Relation Age of Onset    Heart Disease Mother     Other Mother     Stroke Father     Colon Cancer Father          A:  Ms. Weaver's sleep efficiency varies and ranges from 61%-84%. She engages in many behaviors that reinforce her insomnia. She responded well to behavioral interventions to improve sleep today and expressed motivation to continue to address insomnia with CBT-i interventions.       PHQ Scores 11/22/2017 10/24/2017 9/26/2017 9/19/2017 7/3/2017 6/6/2017   PHQ2 Score 2 2 3 5 4 0   PHQ9 Score 8 6 10 14 17 0     Interpretation of Total Score Depression Severity: 1-4 = Minimal depression, 5-9 = Mild depression, 10-14 = Moderate depression, 15-19 = Moderately severe depression, 20-27 = Severe depression        Diagnosis:    Chronic Pain Syndrome  KEVIN  Insomnia      Plan:  Pt interventions:  [place]-setting to identify pt's primary goals for [facility] visit / overall health and Supportive techniques, reviewed sleep log, CBT - i interventions focused on sleep education, stimulus control procedures, sleep restriction, provided handout on behavioral changes to improve sleep, treatment planning       Pt Behavioral Change Plan:  Pt set a goal to 1) continue keeping a sleep log 2) implement identified behavior changes including keeping a regular sleep schedule of 10:30pm -5:30am, stimulus control procedures 3) move time you take Ativan from 6pm to 9:30pm 4) return for f/u in one week

## 2017-11-27 RX ORDER — FLUCONAZOLE 150 MG/1
TABLET ORAL
Qty: 1 TABLET | Refills: 0 | Status: SHIPPED | OUTPATIENT
Start: 2017-11-27 | End: 2018-01-15

## 2017-11-28 ENCOUNTER — TELEPHONE (OUTPATIENT)
Dept: INTERNAL MEDICINE | Age: 62
End: 2017-11-28

## 2017-11-28 RX ORDER — PROMETHAZINE HYDROCHLORIDE AND CODEINE PHOSPHATE 6.25; 1 MG/5ML; MG/5ML
SYRUP ORAL
Qty: 120 ML | Refills: 0 | OUTPATIENT
Start: 2017-11-28 | End: 2018-01-15

## 2017-11-28 RX ORDER — METHYLPREDNISOLONE 4 MG/1
TABLET ORAL
Qty: 1 KIT | Refills: 0 | OUTPATIENT
Start: 2017-11-28 | End: 2017-12-04

## 2017-11-28 NOTE — TELEPHONE ENCOUNTER
Add Medrol Dosepak  Phenergan with codeine 120 mL's 1 teaspoon every 6 hours when necessary cough  I would still stick with the doxycycline for now

## 2017-11-28 NOTE — TELEPHONE ENCOUNTER
From  Sera Weaver To Sent  11/28/2017 11:04 AM   Hi,  I started taking the doxy on Sat. Head still way full of snot, even with 1200mg of mucinex D every 6 hrs.  Coughing up a lung, but seems mostly bronchial. Hurts. Mucus yellow and thin. But, I've started swelling, hands and feet, mostly at night with parasthesia. Drinking lots of water, 3-4 liters per day. Checked weight this am, no gain.  Feel like CRAP. No energy for two weeks.  Took a LOT of tetra. When I was a kid. Would this interfere with doxy being effective now?  Need codeine cough syrup, if I can have it.  This is just going on a lot longer than it should. What to do,  what to do?

## 2017-12-05 ENCOUNTER — OFFICE VISIT (OUTPATIENT)
Dept: PSYCHIATRY | Age: 62
End: 2017-12-05

## 2017-12-05 VITALS
BODY MASS INDEX: 26.1 KG/M2 | HEART RATE: 104 BPM | SYSTOLIC BLOOD PRESSURE: 130 MMHG | HEIGHT: 68 IN | WEIGHT: 172.2 LBS | DIASTOLIC BLOOD PRESSURE: 74 MMHG

## 2017-12-05 DIAGNOSIS — F41.1 GAD (GENERALIZED ANXIETY DISORDER): Primary | ICD-10-CM

## 2017-12-05 DIAGNOSIS — Z00.00 PREVENTATIVE HEALTH CARE: ICD-10-CM

## 2017-12-05 DIAGNOSIS — F32.A DEPRESSIVE DISORDER: ICD-10-CM

## 2017-12-05 LAB
A/G RATIO: 1.8 (ref 1.1–2.2)
ALBUMIN SERPL-MCNC: 4.4 G/DL (ref 3.4–5)
ALP BLD-CCNC: 143 U/L (ref 40–129)
ALT SERPL-CCNC: 15 U/L (ref 10–40)
ANION GAP SERPL CALCULATED.3IONS-SCNC: 17 MMOL/L (ref 3–16)
AST SERPL-CCNC: 15 U/L (ref 15–37)
BASOPHILS ABSOLUTE: 0.1 K/UL (ref 0–0.2)
BASOPHILS RELATIVE PERCENT: 0.6 %
BILIRUB SERPL-MCNC: <0.2 MG/DL (ref 0–1)
BUN BLDV-MCNC: 13 MG/DL (ref 7–20)
CALCIUM SERPL-MCNC: 9.4 MG/DL (ref 8.3–10.6)
CHLORIDE BLD-SCNC: 94 MMOL/L (ref 99–110)
CHOLESTEROL, TOTAL: 239 MG/DL (ref 0–199)
CO2: 25 MMOL/L (ref 21–32)
CREAT SERPL-MCNC: 0.7 MG/DL (ref 0.6–1.2)
EOSINOPHILS ABSOLUTE: 0.1 K/UL (ref 0–0.6)
EOSINOPHILS RELATIVE PERCENT: 1.3 %
GFR AFRICAN AMERICAN: >60
GFR NON-AFRICAN AMERICAN: >60
GLOBULIN: 2.5 G/DL
GLUCOSE BLD-MCNC: 100 MG/DL (ref 70–99)
HCT VFR BLD CALC: 46.1 % (ref 36–48)
HDLC SERPL-MCNC: 87 MG/DL (ref 40–60)
HEMOGLOBIN: 15.5 G/DL (ref 12–16)
LDL CHOLESTEROL CALCULATED: 126 MG/DL
LYMPHOCYTES ABSOLUTE: 4 K/UL (ref 1–5.1)
LYMPHOCYTES RELATIVE PERCENT: 45.7 %
MAGNESIUM: 2.1 MG/DL (ref 1.8–2.4)
MCH RBC QN AUTO: 33.1 PG (ref 26–34)
MCHC RBC AUTO-ENTMCNC: 33.7 G/DL (ref 31–36)
MCV RBC AUTO: 98.3 FL (ref 80–100)
MONOCYTES ABSOLUTE: 0.7 K/UL (ref 0–1.3)
MONOCYTES RELATIVE PERCENT: 7.9 %
NEUTROPHILS ABSOLUTE: 3.9 K/UL (ref 1.7–7.7)
NEUTROPHILS RELATIVE PERCENT: 44.5 %
PDW BLD-RTO: 13.4 % (ref 12.4–15.4)
PLATELET # BLD: 276 K/UL (ref 135–450)
PMV BLD AUTO: 8.2 FL (ref 5–10.5)
POTASSIUM SERPL-SCNC: 4 MMOL/L (ref 3.5–5.1)
RBC # BLD: 4.69 M/UL (ref 4–5.2)
SODIUM BLD-SCNC: 136 MMOL/L (ref 136–145)
TOTAL PROTEIN: 6.9 G/DL (ref 6.4–8.2)
TRIGL SERPL-MCNC: 131 MG/DL (ref 0–150)
TSH REFLEX FT4: 1.21 UIU/ML (ref 0.27–4.2)
VLDLC SERPL CALC-MCNC: 26 MG/DL
WBC # BLD: 8.7 K/UL (ref 4–11)

## 2017-12-05 PROCEDURE — 99214 OFFICE O/P EST MOD 30 MIN: CPT | Performed by: PSYCHIATRY & NEUROLOGY

## 2017-12-05 RX ORDER — CITALOPRAM 20 MG/1
20 TABLET ORAL DAILY
Qty: 30 TABLET | Refills: 3 | Status: SHIPPED | OUTPATIENT
Start: 2017-12-05 | End: 2018-02-13 | Stop reason: SDUPTHER

## 2017-12-05 RX ORDER — LORAZEPAM 1 MG/1
1 TABLET ORAL 3 TIMES DAILY PRN
Qty: 90 TABLET | Refills: 0 | Status: SHIPPED | OUTPATIENT
Start: 2017-12-05 | End: 2018-01-02 | Stop reason: SDUPTHER

## 2017-12-05 NOTE — PROGRESS NOTES
Ativan. \" She says he is irritable and this is straining their relationship. Caring for herself, has normal energy/motivation. Denies SI/HI. Did not do well on Celexa 40mg, increased irritability. Went to back 20mg qAm. She \"ordered\" me to leave her Ativan dose \"alone\", and when I reminded her that we will have to stick to the same plan we've always had, she starting shouting \"no no no no! \" and banged her fists on the table. She calmed down fairly quickly and was more reasonable, stating by the end of the session \"I know how it goes, I got off pain meds and I can do this too. \"     History obtained from patient and chart (confirmed by patient today). Past Psychiatric History:    Prior hospitalizations: Denies   Prior diagnoses: Chronic pain, Anxiety NOS   Prior medication trials/reactions to meds: Xanax, Wellbutrin, Celexa, Doxepin, remeron, Zyprexa, Ambien   Outpatient Treatment: Dr. Iliana Gay. Suicide Attempts: Denies      Substance Use History:   Nicotine:   History   Smoking Status    Current Every Day Smoker    Packs/day: 1.00    Years: 40.00    Types: Cigarettes   Smokeless Tobacco    Never Used      Alcohol: 1 drink/month.  Formerly heavy   Illicits: Denies   Caffeine: 1 cup coffee/day   Rehabs/Complicated W/D: Denies, no DUI    Past Medical/Surgical History:   Past Medical History:   Diagnosis Date    Anxiety state, unspecified     Chronic back pain     Tried spinal injections    Depressive disorder, not elsewhere classified     Duodenal ulcer, unspecified as acute or chronic, without hemorrhage, perforation, or obstruction     Essential hypertension 2/22/2016    Fibromyalgia     GERD (gastroesophageal reflux disease)     Sleep disorder      Past Surgical History:   Procedure Laterality Date    ABDOMINAL ADHESION SURGERY      CARDIOVASCULAR STRESS TEST  02/2016    negative    CHOLECYSTECTOMY      COLONOSCOPY  9/13    negative    HYSTERECTOMY      TONSILLECTOMY      UPPER GASTROINTESTINAL mouth daily 90 tablet 3    cloNIDine (CATAPRES) 0.2 MG tablet Take 0.1 mg by mouth every 8 hours  180 tablet 3    albuterol sulfate HFA (PROVENTIL HFA) 108 (90 Base) MCG/ACT inhaler Inhale 2 puffs into the lungs every 6 hours as needed for Wheezing 1 Inhaler 3    omeprazole (PRILOSEC) 40 MG delayed release capsule TAKE 1 CAPSULE BY MOUTH TWICE DAILY 180 capsule 3    cetirizine (ZYRTEC) 10 MG tablet Take 10 mg by mouth daily      fluticasone (FLONASE) 50 MCG/ACT nasal spray SHAKE LIQUID AND USE 2 SPRAYS IN EACH NOSTRIL EVERY DAY 48 g 3    triamterene-hydrochlorothiazide (MAXZIDE-25) 37.5-25 MG per tablet TAKE 1 TABLET BY MOUTH EVERY DAY 90 tablet 2    medroxyPROGESTERone (PROVERA) 2.5 MG tablet TAKE 1 &1/2 TABLETS BY MOUTH EVERY DAY (Patient taking differently: Take 1.25 mg by mouth 2 times daily ) 135 tablet 1     No current facility-administered medications for this visit. OBJECTIVE:  Vitals: There were no vitals filed for this visit. Wt Readings from Last 3 Encounters:   11/22/17 173 lb (78.5 kg)   11/07/17 168 lb (76.2 kg)   10/12/17 173 lb 9.6 oz (78.7 kg)           ROS: Denies trouble with fever, rash, headache, vision changes, chest pain, shortness of breath, nausea, extremity pain, weakness, dysuria.      Mental Status Exam:     Appearance    alert, cooperative  Muscle strength/tone: no atrophy or abnormal movements, anti-gravity  Gait/station: normal, anti-gravity  Speech    spontaneous, normal rate, normal volume and well articulated  Mood    euthymic  Affect    normal affect Congruent to thought content and mood  Thought Content    intact, no delusions voiced  Thought Process    linear, goal directed and coherent   Associations    logical connections  Perceptions: denies AH/VH, does not appear preoccupied with the internal environment  Insight    Fair  Judgment    Intact  Orientation    oriented to person, place, time, and general circumstances  Memory    recent and remote memory intact  Attention/Concentration    intact  Ability to understand instructions Yes  Ability to respond meaningfully Yes  SI:   no suicidal ideation  HI: Denies HI    Labs:     Lab Results   Component Value Date     10/10/2017    K 4.0 10/10/2017    CL 93 10/10/2017    CO2 27 10/10/2017    BUN 11 10/10/2017    CREATININE 0.7 10/10/2017    GLUCOSE 84 10/10/2017    CALCIUM 9.8 10/10/2017      Lab Results   Component Value Date    WBC 10.0 10/10/2017    HGB 15.1 10/10/2017    HCT 44.2 10/10/2017    MCV 97.4 10/10/2017     10/10/2017     Lab Results   Component Value Date    COLORU YELLOW 12/05/2012    NITRU NEGATIVE 12/05/2012    GLUCOSEU NEGATIVE 12/05/2012    KETUA NEGATIVE 12/05/2012    BILIRUBINUR NEGATIVE 12/05/2012    BILIRUBINUR n 01/04/2011     No results found for: LABA1C  No results found for: EAG  Lab Results   Component Value Date    CHOL 197 09/23/2016    CHOL 277 (H) 12/05/2012     Lab Results   Component Value Date    TRIG 217 (H) 09/23/2016    TRIG 147 12/05/2012     Lab Results   Component Value Date    HDL 56 09/23/2016    HDL 51 12/05/2012     Lab Results   Component Value Date    LDLCALC 98 09/23/2016    LDLCALC 197 (H) 12/05/2012     Lab Results   Component Value Date    LABVLDL 43 09/23/2016     Lab Results   Component Value Date    CHOLHDLRATIO 5.4 (H) 12/05/2012     Lab Results   Component Value Date    TSH 1.46 10/10/2017     No results found for: YCQPPHG1H3  Lab Results   Component Value Date    [id number] 717 10/10/2017     Lab Results   Component Value Date    FOLATE >20.00 10/10/2017           Imaging: no head imaging on file    Axis I  KEVIN, Mood Disorder NOS, chronic pain    Axis II: No diagnosis    Axis III       Diagnosis Date    Anxiety state, unspecified     Chronic back pain     Tried spinal injections    Depressive disorder, not elsewhere classified     Duodenal ulcer, unspecified as acute or chronic, without hemorrhage, perforation, or obstruction     Essential hypertension 2/22/2016    Fibromyalgia     GERD (gastroesophageal reflux disease)     Sleep disorder       Active Problems:    * No active hospital problems. *       Axis IV  Problems with primary support group and Problems related to the social environment    ASSESSMENT AND PLAN      1. Safety: NO Imminent risk of danger to/self/others based on the factors considered below. Appropriate for outpatient level of care. Safety plan includes: 911, PES, hotlines, and interventions discussed today. Risk factors: Age <25 or >55,  chronic pain or medical illness  Protective factors: female gender, denies depression, denies suicidal ideation, does not have lethal plan, does not have access to guns or weapons, patient is arnaud for safety, no prior suicide attempts, no family h/o suicide, no substance abuse, patient has social or family support, no active psychosis or cognitive dysfunction, already has outpatient services in place, compliant with recommended medications, and patient is future oriented. 2. Psychiatric: overly sedated on 6mg ativan/day. H/o anxiety and depression and it's possible these may worsen after remove Ativan. Will continue to ensure she engages in therapy.   -Encouraging smoking cessation. She sometimes smokes at night when she can't sleep.   -Continue Celexa 20mg qAM   -Can consider augment with Abilify or others down the road. -DECREASE Ativan to 3mg/day. Plan is to taper down by 1mg/day/month. Can increase taper speed if needed. Pt is resistant to coming off this, however she understands this is not an appropriate  long-term anxiety solution and it will need to be  tapered off. Understands the risks of  dependence/abuse, falls, cognitive deficits,  dementia, all-cause mortality. -UDS confirmatory c/w hx  -Labs: reviewed in Epic   -Continue therapy with Dr. Sherley Valenzuela. -OARRS reviewed, c/w history  -R/b/se/a d/w pt who consents. 3. Medical  -Following with Shahid Leal MD    4. Substance   -No active issues. 5. RTC - 4 weeks    Sofi Cooley M.D.   Psychiatrist

## 2017-12-06 DIAGNOSIS — M46.1 BILATERAL SACROILIITIS (HCC): ICD-10-CM

## 2017-12-06 DIAGNOSIS — M47.27 LUMBOSACRAL SPONDYLOSIS WITH RADICULOPATHY: ICD-10-CM

## 2017-12-06 RX ORDER — MELOXICAM 15 MG/1
15 TABLET ORAL DAILY
Qty: 30 TABLET | Refills: 0 | Status: SHIPPED | OUTPATIENT
Start: 2017-12-06 | End: 2018-01-02 | Stop reason: SDUPTHER

## 2017-12-07 ENCOUNTER — TELEPHONE (OUTPATIENT)
Dept: INTERNAL MEDICINE | Age: 62
End: 2017-12-07

## 2017-12-07 NOTE — TELEPHONE ENCOUNTER
Pt called needs RX for  citalopram (CELEXA) 20 MG tablet 90 day supply instead of 30 x's 3 Sent to . .. Pharm on file . .. Advised Pt to check with Pharm in 24 hours . ..  Pls send    For insurance purposes it is more cost efficient for Pt to get this in 90 day supplies- Please call Pharmacy to discuss

## 2017-12-08 ENCOUNTER — OFFICE VISIT (OUTPATIENT)
Dept: PSYCHOLOGY | Age: 62
End: 2017-12-08

## 2017-12-08 DIAGNOSIS — F51.01 PRIMARY INSOMNIA: Primary | ICD-10-CM

## 2017-12-08 DIAGNOSIS — F41.1 GAD (GENERALIZED ANXIETY DISORDER): ICD-10-CM

## 2017-12-08 PROCEDURE — 90832 PSYTX W PT 30 MINUTES: CPT | Performed by: PSYCHOLOGIST

## 2017-12-08 ASSESSMENT — PATIENT HEALTH QUESTIONNAIRE - PHQ9
5. POOR APPETITE OR OVEREATING: 2
6. FEELING BAD ABOUT YOURSELF - OR THAT YOU ARE A FAILURE OR HAVE LET YOURSELF OR YOUR FAMILY DOWN: 0
1. LITTLE INTEREST OR PLEASURE IN DOING THINGS: 1
2. FEELING DOWN, DEPRESSED OR HOPELESS: 1
SUM OF ALL RESPONSES TO PHQ9 QUESTIONS 1 & 2: 2
9. THOUGHTS THAT YOU WOULD BE BETTER OFF DEAD, OR OF HURTING YOURSELF: 0
3. TROUBLE FALLING OR STAYING ASLEEP: 2
7. TROUBLE CONCENTRATING ON THINGS, SUCH AS READING THE NEWSPAPER OR WATCHING TELEVISION: 0
SUM OF ALL RESPONSES TO PHQ QUESTIONS 1-9: 9
4. FEELING TIRED OR HAVING LITTLE ENERGY: 3
8. MOVING OR SPEAKING SO SLOWLY THAT OTHER PEOPLE COULD HAVE NOTICED. OR THE OPPOSITE, BEING SO FIGETY OR RESTLESS THAT YOU HAVE BEEN MOVING AROUND A LOT MORE THAN USUAL: 0

## 2017-12-08 NOTE — PROGRESS NOTES
Behavioral Health Consultation  [address], [address] [place] [address]  Psychologist  12/8/2017  10:19 AM      Time spent with Patient: 30 minutes  This is patient's sixth [facility] appointment. Reason for Consult:  Anxiety, depression, chronic pain  Referring Provider: Rachel Ortiz MD  [address] [facility] [address], [address]    Feedback given to PCP. S:  During the last visit pt set a goal to 1) continue keeping a sleep log 2) implement identified behavior changes including keeping a regular sleep schedule of 10:30pm -5:30am, stimulus control procedures 3) move time you take Ativan from 6pm to 9:30pm 4) return for f/u in one week      Hasn't been working on sleep changes as she has been ill. Been resting more and schedule different due to illness. Feeling better so thinks she will restart in a couple days. Has been on 3mg/day of Ativan for the past 2 days, as recently saw psychiatry. So far isn't feeling more anxious and is \"doing ok. \" Has worried about coming off Ativan but does wonder if maybe she doesn't struggle with the same anxiety that she used to when she was started on the Ativan. Does want to learn to manage anxiety if it arises while tapering off Ativan.          O:  MSE:    Appearance    alert, cooperative  Sleep disturbance Yes  Fatigue Yes  Loss of pleasure Yes  Impulsive behavior No  Speech    normal rate and normal volume  Mood    \"ok, tired\"  Affect    Congruent to thought content and mood  Thought Content    intact  Thought Process    linear and goal directed  Associations    logical connections  Insight    Fair to good  Judgment    Intact  Orientation    oriented to person, place, time, and general circumstances  Memory    recent and remote memory intact  Attention/Concentration    intact  Ability to understand instructions Yes  Ability to respond meaningfully Yes  Suicide Assessment    Denies SI      History:    Medications:   Current Outpatient Prescriptions   Medication Sig Dispense Refill  meloxicam (MOBIC) 15 MG tablet Take 1 tablet by mouth daily With food 30 tablet 0    citalopram (CELEXA) 20 MG tablet Take 1 tablet by mouth daily 30 tablet 3    LORazepam (ATIVAN) 1 MG tablet Take 1 tablet by mouth 3 times daily as needed for Anxiety . 90 tablet 0    promethazine-codeine (PHENERGAN WITH CODEINE) 6.25-10 MG/5ML syrup Take 1 tsp q6hr prn cough. 120 mL 0    fluconazole (DIFLUCAN) 150 MG tablet TAKE 1 TABLET BY MOUTH FOR ONE DOSE 1 tablet 0    doxycycline hyclate (VIBRAMYCIN) 100 MG capsule Take 1 capsule by mouth 2 times daily 20 capsule 0    dicyclomine (BENTYL) 20 MG tablet TAKE 1 TABLET EVERY 6 HOURS 60 tablet 2    oxyCODONE-acetaminophen (PERCOCET) 5-325 MG per tablet Take 1 tablet by mouth 2 times daily as needed (severe pain) .  Earliest Fill Date: 10/24/17 60 tablet 0    tiZANidine (ZANAFLEX) 4 MG tablet TAKE 1 TABLET BY MOUTH EVERY 8 HOURS AS NEEDED FOR MUSCLE SPASM 270 tablet 1    sulfacetamide (BLEPH-10) 10 % ophthalmic solution 5ml to infected eye while awake 5 mL 0    estradiol (ESTRACE) 2 MG tablet Take 1 tablet by mouth daily 90 tablet 3    cloNIDine (CATAPRES) 0.2 MG tablet Take 0.1 mg by mouth every 8 hours  180 tablet 3    albuterol sulfate HFA (PROVENTIL HFA) 108 (90 Base) MCG/ACT inhaler Inhale 2 puffs into the lungs every 6 hours as needed for Wheezing 1 Inhaler 3    omeprazole (PRILOSEC) 40 MG delayed release capsule TAKE 1 CAPSULE BY MOUTH TWICE DAILY 180 capsule 3    cetirizine (ZYRTEC) 10 MG tablet Take 10 mg by mouth daily      fluticasone (FLONASE) 50 MCG/ACT nasal spray SHAKE LIQUID AND USE 2 SPRAYS IN EACH NOSTRIL EVERY DAY 48 g 3    triamterene-hydrochlorothiazide (MAXZIDE-25) 37.5-25 MG per tablet TAKE 1 TABLET BY MOUTH EVERY DAY 90 tablet 2    medroxyPROGESTERone (PROVERA) 2.5 MG tablet TAKE 1 &1/2 TABLETS BY MOUTH EVERY DAY (Patient taking differently: Take 1.25 mg by mouth 2 times daily ) 135 tablet 1     No current facility-administered medications

## 2017-12-08 NOTE — TELEPHONE ENCOUNTER
Patient already picked up the 30 day supply of medication, thanked us for the call and simply asked that we change it for future refills. I will call [facility] to update.

## 2017-12-21 ENCOUNTER — OFFICE VISIT (OUTPATIENT)
Dept: PSYCHOLOGY | Age: 62
End: 2017-12-21

## 2017-12-21 DIAGNOSIS — F41.1 GAD (GENERALIZED ANXIETY DISORDER): Primary | ICD-10-CM

## 2017-12-21 PROCEDURE — 90832 PSYTX W PT 30 MINUTES: CPT | Performed by: PSYCHOLOGIST

## 2017-12-21 ASSESSMENT — PATIENT HEALTH QUESTIONNAIRE - PHQ9
SUM OF ALL RESPONSES TO PHQ9 QUESTIONS 1 & 2: 2
7. TROUBLE CONCENTRATING ON THINGS, SUCH AS READING THE NEWSPAPER OR WATCHING TELEVISION: 0
3. TROUBLE FALLING OR STAYING ASLEEP: 2
6. FEELING BAD ABOUT YOURSELF - OR THAT YOU ARE A FAILURE OR HAVE LET YOURSELF OR YOUR FAMILY DOWN: 0
4. FEELING TIRED OR HAVING LITTLE ENERGY: 1
5. POOR APPETITE OR OVEREATING: 0
1. LITTLE INTEREST OR PLEASURE IN DOING THINGS: 1
2. FEELING DOWN, DEPRESSED OR HOPELESS: 1
SUM OF ALL RESPONSES TO PHQ QUESTIONS 1-9: 7
8. MOVING OR SPEAKING SO SLOWLY THAT OTHER PEOPLE COULD HAVE NOTICED. OR THE OPPOSITE, BEING SO FIGETY OR RESTLESS THAT YOU HAVE BEEN MOVING AROUND A LOT MORE THAN USUAL: 2
9. THOUGHTS THAT YOU WOULD BE BETTER OFF DEAD, OR OF HURTING YOURSELF: 0

## 2017-12-21 NOTE — PROGRESS NOTES
Behavioral Health Consultation  [address] PsyD  Psychologist  12/21/2017  10:07 AM      Time spent with Patient: 30 minutes  This is patient's seventh [facility] appointment. Reason for Consult:  Anxiety, depression, chronic pain  Referring Provider: Abeba Perkins MD  [address] [facility], [address]    Feedback given to PCP. S:  During the last visit pt set a goal to 1) when feeling better from illness, continue keeping a sleep log 2) implement identified behavior changes including keeping a regular sleep schedule of 10:30pm -5:30am, stimulus control procedures 3) monitor for any difficulty with anxiety 4) return for f/u in two weeks    Pt reports she is \"alright. \" Has been exercising more. More pain due to weather. Has a hard time during winter. Tends to \"hibernate\" during the winter and is going to stay that way but trying to go out every once in awhile. Sleep has improved since decreasing water intake. Also notices that when she is more sedentary she sleeps better than when she is more active. Feels she is \"getting comfortable in [her] own skin\" now that she has been adjusting to all the med changes over the past few months. Feels more shaky at times but is \"doing ok. \" Wants to stay at 3mg of Ativan a day for a little while.       O:  MSE:    Appearance    alert, cooperative  Sleep disturbance Yes  Fatigue Yes  Loss of pleasure Yes  Impulsive behavior No  Speech    normal rate and normal volume  Mood    \"Alright\"  Affect    Congruent to thought content and mood  Thought Content    intact  Thought Process    linear and goal directed  Associations    logical connections  Insight    Fair to good  Judgment    Intact  Orientation    oriented to person, place, time, and general circumstances  Memory    recent and remote memory intact  Attention/Concentration    intact  Ability to understand instructions Yes  Ability to respond meaningfully Yes  Suicide Assessment    Denies SI      History:    Medications:   Current Outpatient Prescriptions   Medication Sig Dispense Refill    meloxicam (MOBIC) 15 MG tablet Take 1 tablet by mouth daily With food 30 tablet 0    citalopram (CELEXA) 20 MG tablet Take 1 tablet by mouth daily 30 tablet 3    LORazepam (ATIVAN) 1 MG tablet Take 1 tablet by mouth 3 times daily as needed for Anxiety . 90 tablet 0    promethazine-codeine (PHENERGAN WITH CODEINE) 6.25-10 MG/5ML syrup Take 1 tsp q6hr prn cough. 120 mL 0    fluconazole (DIFLUCAN) 150 MG tablet TAKE 1 TABLET BY MOUTH FOR ONE DOSE 1 tablet 0    doxycycline hyclate (VIBRAMYCIN) 100 MG capsule Take 1 capsule by mouth 2 times daily 20 capsule 0    dicyclomine (BENTYL) 20 MG tablet TAKE 1 TABLET EVERY 6 HOURS 60 tablet 2    oxyCODONE-acetaminophen (PERCOCET) 5-325 MG per tablet Take 1 tablet by mouth 2 times daily as needed (severe pain) .  Earliest Fill Date: 10/24/17 60 tablet 0    tiZANidine (ZANAFLEX) 4 MG tablet TAKE 1 TABLET BY MOUTH EVERY 8 HOURS AS NEEDED FOR MUSCLE SPASM 270 tablet 1    sulfacetamide (BLEPH-10) 10 % ophthalmic solution 5ml to infected eye while awake 5 mL 0    estradiol (ESTRACE) 2 MG tablet Take 1 tablet by mouth daily 90 tablet 3    cloNIDine (CATAPRES) 0.2 MG tablet Take 0.1 mg by mouth every 8 hours  180 tablet 3    albuterol sulfate HFA (PROVENTIL HFA) 108 (90 Base) MCG/ACT inhaler Inhale 2 puffs into the lungs every 6 hours as needed for Wheezing 1 Inhaler 3    omeprazole (PRILOSEC) 40 MG delayed release capsule TAKE 1 CAPSULE BY MOUTH TWICE DAILY 180 capsule 3    cetirizine (ZYRTEC) 10 MG tablet Take 10 mg by mouth daily      fluticasone (FLONASE) 50 MCG/ACT nasal spray SHAKE LIQUID AND USE 2 SPRAYS IN EACH NOSTRIL EVERY DAY 48 g 3    triamterene-hydrochlorothiazide (MAXZIDE-25) 37.5-25 MG per tablet TAKE 1 TABLET BY MOUTH EVERY DAY 90 tablet 2    medroxyPROGESTERone (PROVERA) 2.5 MG tablet TAKE 1 &1/2 TABLETS BY MOUTH EVERY DAY (Patient taking differently: Take 1.25 mg by mouth 2 times daily ) 135 tablet 1     No current facility-administered medications for this visit. Social History:   Social History     Social History    Marital status:      Spouse name: N/A    Number of children: N/A    Years of education: N/A     Occupational History    Disabled      2010     Social History Main Topics    Smoking status: Current Every Day Smoker     Packs/day: 1.00     Years: 40.00     Types: Cigarettes    Smokeless tobacco: Never Used    Alcohol use 0.6 oz/week     1 Glasses of wine per week      Comment: occasionaly    Drug use: No    Sexual activity: Not Currently     Other Topics Concern    Not on file     Social History Narrative    No narrative on file       TOBACCO:   reports that she has been smoking Cigarettes. She has a 40.00 pack-year smoking history. She has never used smokeless tobacco.  ETOH:   reports that she drinks about 0.6 oz of alcohol per week . Family History:   Family History   Problem Relation Age of Onset    Heart Disease Mother     Other Mother     Stroke Father     Colon Cancer Father          A:  Ms. Jas Juan denies difficulty with anxiety at this time. She reports her sleep has improved somewhat by making some behavior changes and feels sleep is \"pretty good\" at this time. She continues to experience chronic pain but feels it is well managed. She was encouraged to monitor for any increasing difficulty with pain or anxiety management as she continues to be tapered off Ativan and to return for anxiety management skills. She is agreeable.       PHQ Scores 12/21/2017 12/8/2017 11/22/2017 10/24/2017 9/26/2017 9/19/2017 7/3/2017   PHQ2 Score 2 2 2 2 3 5 4   PHQ9 Score 7 9 8 6 10 14 17     Interpretation of Total Score Depression Severity: 1-4 = Minimal depression, 5-9 = Mild depression, 10-14 = Moderate depression, 15-19 = Moderately severe depression, 20-27 = Severe depression        Diagnosis:    Chronic Pain Syndrome  KEVIN  Insomnia      Plan:  Pt interventions:  [place]-setting to identify pt's primary goals for [facility] visit / overall health and Supportive techniques, , MI interventions, treatment planning       Pt Behavioral Change Plan:  Pt set a goal to monitor for any difficulty with anxiety or pain and/or management and return for f/u, as needed

## 2018-01-02 ENCOUNTER — OFFICE VISIT (OUTPATIENT)
Dept: PSYCHIATRY | Age: 63
End: 2018-01-02

## 2018-01-02 VITALS
SYSTOLIC BLOOD PRESSURE: 166 MMHG | WEIGHT: 174 LBS | HEIGHT: 68 IN | DIASTOLIC BLOOD PRESSURE: 104 MMHG | OXYGEN SATURATION: 98 % | BODY MASS INDEX: 26.37 KG/M2 | HEART RATE: 116 BPM

## 2018-01-02 DIAGNOSIS — M47.27 LUMBOSACRAL SPONDYLOSIS WITH RADICULOPATHY: ICD-10-CM

## 2018-01-02 DIAGNOSIS — F32.A DEPRESSIVE DISORDER: ICD-10-CM

## 2018-01-02 DIAGNOSIS — M46.1 BILATERAL SACROILIITIS (HCC): ICD-10-CM

## 2018-01-02 DIAGNOSIS — F41.1 GAD (GENERALIZED ANXIETY DISORDER): Primary | ICD-10-CM

## 2018-01-02 PROCEDURE — 99214 OFFICE O/P EST MOD 30 MIN: CPT | Performed by: PSYCHIATRY & NEUROLOGY

## 2018-01-02 RX ORDER — LORAZEPAM 1 MG/1
1 TABLET ORAL 2 TIMES DAILY PRN
Qty: 60 TABLET | Refills: 0 | Status: SHIPPED | OUTPATIENT
Start: 2018-01-02 | End: 2018-01-15 | Stop reason: SDUPTHER

## 2018-01-02 RX ORDER — MELOXICAM 15 MG/1
15 TABLET ORAL DAILY
Qty: 30 TABLET | Refills: 0 | Status: SHIPPED | OUTPATIENT
Start: 2018-01-02 | End: 2018-02-04 | Stop reason: SDUPTHER

## 2018-01-02 NOTE — PROGRESS NOTES
coffee/day   Rehabs/Complicated W/D: Denies, no DUI    Past Medical/Surgical History:   Past Medical History:   Diagnosis Date    Anxiety state, unspecified     Chronic back pain     Tried spinal injections    Depressive disorder, not elsewhere classified     Duodenal ulcer, unspecified as acute or chronic, without hemorrhage, perforation, or obstruction     Essential hypertension 2/22/2016    Fibromyalgia     GERD (gastroesophageal reflux disease)     Sleep disorder      Past Surgical History:   Procedure Laterality Date    ABDOMINAL ADHESION SURGERY      CARDIOVASCULAR STRESS TEST  02/2016    negative    CHOLECYSTECTOMY      COLONOSCOPY  9/13    negative    HYSTERECTOMY      TONSILLECTOMY      UPPER GASTROINTESTINAL ENDOSCOPY  9/13    small paraesoph hernia          PCP: Yusra Osorio MD      Social/Developmental History:    Developmental: Father had brain tumor when she was young   Marital: Twice. Currently 20 years   Children: DEnies   Family:    Housing: Lives with    Occupation/Income: SSDI. Formerly nurse   Education:              Sabianism:    Legal hx: Denies   Abuse hx:   Violence hx:   Access to firearms: No    Family History:    Medical:  Family History   Problem Relation Age of Onset    Heart Disease Mother     Other Mother     Stroke Father     Colon Cancer Father       Psychiatric: Denies   History of completed suicide: Denies    Allergies: Allergies   Allergen Reactions    Cipro Xr      N/V    Macrodantin [Nitrofurantoin]     Morphine      headache         Current Medications:   Current Outpatient Prescriptions   Medication Sig Dispense Refill    meloxicam (MOBIC) 15 MG tablet Take 1 tablet by mouth daily With food 30 tablet 0    citalopram (CELEXA) 20 MG tablet Take 1 tablet by mouth daily 30 tablet 3    LORazepam (ATIVAN) 1 MG tablet Take 1 tablet by mouth 3 times daily as needed for Anxiety .  90 tablet 0    promethazine-codeine (PHENERGAN WITH CODEINE) 6.25-10 MG/5ML syrup Take 1 tsp q6hr prn cough. 120 mL 0    fluconazole (DIFLUCAN) 150 MG tablet TAKE 1 TABLET BY MOUTH FOR ONE DOSE 1 tablet 0    doxycycline hyclate (VIBRAMYCIN) 100 MG capsule Take 1 capsule by mouth 2 times daily 20 capsule 0    dicyclomine (BENTYL) 20 MG tablet TAKE 1 TABLET EVERY 6 HOURS 60 tablet 2    oxyCODONE-acetaminophen (PERCOCET) 5-325 MG per tablet Take 1 tablet by mouth 2 times daily as needed (severe pain) . Earliest Fill Date: 10/24/17 60 tablet 0    tiZANidine (ZANAFLEX) 4 MG tablet TAKE 1 TABLET BY MOUTH EVERY 8 HOURS AS NEEDED FOR MUSCLE SPASM 270 tablet 1    sulfacetamide (BLEPH-10) 10 % ophthalmic solution 5ml to infected eye while awake 5 mL 0    estradiol (ESTRACE) 2 MG tablet Take 1 tablet by mouth daily 90 tablet 3    cloNIDine (CATAPRES) 0.2 MG tablet Take 0.1 mg by mouth every 8 hours  180 tablet 3    albuterol sulfate HFA (PROVENTIL HFA) 108 (90 Base) MCG/ACT inhaler Inhale 2 puffs into the lungs every 6 hours as needed for Wheezing 1 Inhaler 3    omeprazole (PRILOSEC) 40 MG delayed release capsule TAKE 1 CAPSULE BY MOUTH TWICE DAILY 180 capsule 3    cetirizine (ZYRTEC) 10 MG tablet Take 10 mg by mouth daily      fluticasone (FLONASE) 50 MCG/ACT nasal spray SHAKE LIQUID AND USE 2 SPRAYS IN EACH NOSTRIL EVERY DAY 48 g 3    triamterene-hydrochlorothiazide (MAXZIDE-25) 37.5-25 MG per tablet TAKE 1 TABLET BY MOUTH EVERY DAY 90 tablet 2    medroxyPROGESTERone (PROVERA) 2.5 MG tablet TAKE 1 &1/2 TABLETS BY MOUTH EVERY DAY (Patient taking differently: Take 1.25 mg by mouth 2 times daily ) 135 tablet 1     No current facility-administered medications for this visit. OBJECTIVE:  Vitals: There were no vitals filed for this visit.   Wt Readings from Last 3 Encounters:   12/05/17 172 lb 3.2 oz (78.1 kg)   11/22/17 173 lb (78.5 kg)   11/07/17 168 lb (76.2 kg)           ROS: Denies trouble with fever, rash, headache, vision changes, chest pain, shortness of breath, nausea, extremity pain, weakness, dysuria.      Mental Status Exam:     Appearance    alert, cooperative  Muscle strength/tone: no atrophy or abnormal movements, anti-gravity  Gait/station: normal, anti-gravity  Speech    spontaneous, normal rate, normal volume and well articulated  Mood    euthymic  Affect    normal affect Congruent to thought content and mood  Thought Content    intact, no delusions voiced  Thought Process    linear, goal directed and coherent   Associations    logical connections  Perceptions: denies AH/VH, does not appear preoccupied with the internal environment  Insight    Fair  Judgment    Intact  Orientation    oriented to person, place, time, and general circumstances  Memory    recent and remote memory intact  Attention/Concentration    intact  Ability to understand instructions Yes  Ability to respond meaningfully Yes  SI:   no suicidal ideation  HI: Denies HI    Labs:     Lab Results   Component Value Date     12/05/2017    K 4.0 12/05/2017    CL 94 12/05/2017    CO2 25 12/05/2017    BUN 13 12/05/2017    CREATININE 0.7 12/05/2017    GLUCOSE 100 12/05/2017    CALCIUM 9.4 12/05/2017      Lab Results   Component Value Date    WBC 8.7 12/05/2017    HGB 15.5 12/05/2017    HCT 46.1 12/05/2017    MCV 98.3 12/05/2017     12/05/2017     Lab Results   Component Value Date    COLORU YELLOW 12/05/2012    NITRU NEGATIVE 12/05/2012    GLUCOSEU NEGATIVE 12/05/2012    KETUA NEGATIVE 12/05/2012    BILIRUBINUR NEGATIVE 12/05/2012    BILIRUBINUR n 01/04/2011     No results found for: LABA1C  No results found for: EAG  Lab Results   Component Value Date    CHOL 239 (H) 12/05/2017    CHOL 197 09/23/2016    CHOL 277 (H) 12/05/2012     Lab Results   Component Value Date    TRIG 131 12/05/2017    TRIG 217 (H) 09/23/2016    TRIG 147 12/05/2012     Lab Results   Component Value Date    HDL 87 (H) 12/05/2017    HDL 56 09/23/2016    HDL 51 12/05/2012     Lab Results   Component Value Date    LDLCALC 126

## 2018-01-08 ENCOUNTER — TELEPHONE (OUTPATIENT)
Dept: INTERNAL MEDICINE | Age: 63
End: 2018-01-08

## 2018-01-08 RX ORDER — DICYCLOMINE HCL 20 MG
TABLET ORAL
Qty: 60 TABLET | Refills: 0 | Status: SHIPPED | OUTPATIENT
Start: 2018-01-08 | End: 2018-01-23 | Stop reason: SDUPTHER

## 2018-01-08 NOTE — TELEPHONE ENCOUNTER
Pt would like an appointment to see Dr. Petros Sky this week. Pt aware that Dr. Petros Sky is booked. Pt had an appointment with Dr. Padma Hauser last week and her BP was elevated. Please call.

## 2018-01-15 ENCOUNTER — OFFICE VISIT (OUTPATIENT)
Dept: INTERNAL MEDICINE | Age: 63
End: 2018-01-15

## 2018-01-15 VITALS
DIASTOLIC BLOOD PRESSURE: 78 MMHG | BODY MASS INDEX: 26.52 KG/M2 | HEIGHT: 68 IN | HEART RATE: 119 BPM | OXYGEN SATURATION: 98 % | WEIGHT: 175 LBS | SYSTOLIC BLOOD PRESSURE: 138 MMHG

## 2018-01-15 DIAGNOSIS — F17.210 CIGARETTE SMOKER: ICD-10-CM

## 2018-01-15 DIAGNOSIS — R00.0 SINUS TACHYCARDIA: ICD-10-CM

## 2018-01-15 DIAGNOSIS — F41.1 GAD (GENERALIZED ANXIETY DISORDER): ICD-10-CM

## 2018-01-15 DIAGNOSIS — G89.4 CHRONIC PAIN SYNDROME: ICD-10-CM

## 2018-01-15 DIAGNOSIS — I10 ESSENTIAL HYPERTENSION: ICD-10-CM

## 2018-01-15 DIAGNOSIS — F32.A DEPRESSIVE DISORDER: ICD-10-CM

## 2018-01-15 DIAGNOSIS — R00.0 TACHYCARDIA: Primary | ICD-10-CM

## 2018-01-15 PROBLEM — J06.9 URI, ACUTE: Status: RESOLVED | Noted: 2017-11-22 | Resolved: 2018-01-15

## 2018-01-15 PROCEDURE — 93000 ELECTROCARDIOGRAM COMPLETE: CPT | Performed by: INTERNAL MEDICINE

## 2018-01-15 PROCEDURE — 99214 OFFICE O/P EST MOD 30 MIN: CPT | Performed by: INTERNAL MEDICINE

## 2018-01-15 RX ORDER — LORAZEPAM 1 MG/1
1 TABLET ORAL EVERY 6 HOURS PRN
Qty: 120 TABLET | Refills: 0 | Status: SHIPPED | OUTPATIENT
Start: 2018-01-15 | End: 2018-02-14 | Stop reason: SDUPTHER

## 2018-01-15 RX ORDER — METOPROLOL SUCCINATE 50 MG/1
50 TABLET, EXTENDED RELEASE ORAL DAILY
Qty: 90 TABLET | Refills: 3 | Status: SHIPPED | OUTPATIENT
Start: 2018-01-15 | End: 2018-03-13

## 2018-01-15 RX ORDER — METOPROLOL SUCCINATE 50 MG/1
50 TABLET, EXTENDED RELEASE ORAL DAILY
Qty: 30 TABLET | Refills: 5 | Status: SHIPPED | OUTPATIENT
Start: 2018-01-15 | End: 2018-01-15 | Stop reason: SDUPTHER

## 2018-01-15 ASSESSMENT — ENCOUNTER SYMPTOMS
BACK PAIN: 1
SHORTNESS OF BREATH: 0
TROUBLE SWALLOWING: 0
GASTROINTESTINAL NEGATIVE: 1

## 2018-01-15 NOTE — ASSESSMENT & PLAN NOTE
Patient's anxiety is worse during the winter[facility] DD symptoms. Recent trial to decrease Ativan has not gone well. She will try again in the spring. Go back to 4 times a day.

## 2018-01-15 NOTE — ASSESSMENT & PLAN NOTE
Her blood pressure has been elevated at home and she has been tachycardic. May have coincided with the decrease in her Ativan. Ativan now and 4 times a day. Continue clonidine at total of 0.4 mg daily. Add metoprolol for tachycardia.

## 2018-01-23 ENCOUNTER — TELEPHONE (OUTPATIENT)
Dept: INTERNAL MEDICINE | Age: 63
End: 2018-01-23

## 2018-01-23 RX ORDER — CLOTRIMAZOLE AND BETAMETHASONE DIPROPIONATE 10; .64 MG/G; MG/G
CREAM TOPICAL
Qty: 15 G | Refills: 0 | Status: SHIPPED | OUTPATIENT
Start: 2018-01-23 | End: 2018-01-31

## 2018-01-23 NOTE — TELEPHONE ENCOUNTER
----- Message from [address] to Zeeshan Robb MD sent at 1/23/2018 10:34 AM -----   Hi,  Since I've been taking this beta blocker, B/P been better but not great. .. high diastolic, 86. .. high systolic, 681LLI.  Pulse,   depending on activity.  Biggest problem,  I've got hives all over my toes.  Just my toes. They are driving me NUTS! I've tried everything. Truly. Nothing new except that beta blocker. Could it be?  Thanks, Kim

## 2018-01-24 RX ORDER — DICYCLOMINE HCL 20 MG
TABLET ORAL
Qty: 60 TABLET | Refills: 1 | Status: SHIPPED | OUTPATIENT
Start: 2018-01-24 | End: 2018-03-05 | Stop reason: SDUPTHER

## 2018-01-24 RX ORDER — FLUCONAZOLE 150 MG/1
TABLET ORAL
Qty: 1 TABLET | Refills: 1 | Status: SHIPPED | OUTPATIENT
Start: 2018-01-24 | End: 2018-01-31

## 2018-01-31 ENCOUNTER — TELEPHONE (OUTPATIENT)
Dept: INTERNAL MEDICINE | Age: 63
End: 2018-01-31

## 2018-01-31 NOTE — TELEPHONE ENCOUNTER
From  Sera Weaver To Sent  1/31/2018  3:23 PM   Hi,  I feel terrible.  My feet swell at night.  I still have hives on my toes.  I have zero energy. My back is worse. Seem more anxious. I'm on 4 or 5 meds I'm not supposed to take with toprol. I've been getting really cold at night, chilling. It's warm in here, but I'm not. Nauseated on and off.  Headaches. Apathetic about everything.  Help!   Kim    Encounter Messages

## 2018-02-04 DIAGNOSIS — M47.27 LUMBOSACRAL SPONDYLOSIS WITH RADICULOPATHY: ICD-10-CM

## 2018-02-04 DIAGNOSIS — M46.1 BILATERAL SACROILIITIS (HCC): ICD-10-CM

## 2018-02-05 RX ORDER — MELOXICAM 15 MG/1
15 TABLET ORAL DAILY
Qty: 30 TABLET | Refills: 0 | Status: SHIPPED | OUTPATIENT
Start: 2018-02-05 | End: 2018-03-05 | Stop reason: SDUPTHER

## 2018-02-06 ENCOUNTER — TELEPHONE (OUTPATIENT)
Dept: INTERNAL MEDICINE | Age: 63
End: 2018-02-06

## 2018-02-13 RX ORDER — CITALOPRAM 20 MG/1
20 TABLET ORAL DAILY
Qty: 90 TABLET | Refills: 3 | Status: SHIPPED | OUTPATIENT
Start: 2018-02-13 | End: 2019-01-10

## 2018-02-14 DIAGNOSIS — F41.1 GAD (GENERALIZED ANXIETY DISORDER): ICD-10-CM

## 2018-02-14 RX ORDER — LORAZEPAM 1 MG/1
TABLET ORAL
Qty: 120 TABLET | Refills: 0 | OUTPATIENT
Start: 2018-02-14 | End: 2018-03-14

## 2018-02-14 RX ORDER — LORAZEPAM 1 MG/1
TABLET ORAL
Qty: 120 TABLET | Refills: 0 | OUTPATIENT
Start: 2018-02-14 | End: 2018-03-13 | Stop reason: SDUPTHER

## 2018-02-23 ENCOUNTER — TELEPHONE (OUTPATIENT)
Dept: INTERNAL MEDICINE | Age: 63
End: 2018-02-23

## 2018-03-05 DIAGNOSIS — M47.27 LUMBOSACRAL SPONDYLOSIS WITH RADICULOPATHY: ICD-10-CM

## 2018-03-05 DIAGNOSIS — M46.1 BILATERAL SACROILIITIS (HCC): ICD-10-CM

## 2018-03-06 RX ORDER — MELOXICAM 15 MG/1
15 TABLET ORAL DAILY
Qty: 30 TABLET | Refills: 0 | Status: SHIPPED | OUTPATIENT
Start: 2018-03-06 | End: 2018-04-02 | Stop reason: SDUPTHER

## 2018-03-06 RX ORDER — DICYCLOMINE HCL 20 MG
TABLET ORAL
Qty: 60 TABLET | Refills: 0 | Status: SHIPPED | OUTPATIENT
Start: 2018-03-06 | End: 2018-04-02 | Stop reason: SDUPTHER

## 2018-03-08 ENCOUNTER — HOSPITAL ENCOUNTER (OUTPATIENT)
Dept: CARDIAC CATH/INVASIVE PROCEDURES | Age: 63
Discharge: HOME | End: 2018-03-08
Attending: ANESTHESIOLOGY | Admitting: ANESTHESIOLOGY

## 2018-03-12 ENCOUNTER — TELEPHONE (OUTPATIENT)
Dept: INTERNAL MEDICINE | Age: 63
End: 2018-03-12

## 2018-03-12 RX ORDER — PROMETHAZINE HYDROCHLORIDE AND CODEINE PHOSPHATE 6.25; 1 MG/5ML; MG/5ML
SYRUP ORAL
Qty: 120 ML | Refills: 0 | OUTPATIENT
Start: 2018-03-12

## 2018-03-12 NOTE — TELEPHONE ENCOUNTER
From  [address] To Sent  3/12/2018  8:33 AM   Hi,  Still sick.  No fever, but feel terrible.  Nose running always, but now also brown, dark yellow. Down my throat, hacking, wheezing. Lungs feel tight but sound pretty normal.  B/P still up and hide ALWAYS  94 or greater. Tried to exercise a few times, but pulse has been up to 150, and I feel exhausted after just walking the dog 2 blocks. My toes still swelling and have hives. Have tried EVERYTHING on them and saw foot DR he doesn't know. I've been weighing every morning, not holding water.    When I get an infection, my pulse goes up. I think I have infection somewhere, or have had an infection since sick last time.  I don't know, but it's driving me NUTS!!!  Help, please

## 2018-03-13 ENCOUNTER — OFFICE VISIT (OUTPATIENT)
Dept: INTERNAL MEDICINE | Age: 63
End: 2018-03-13

## 2018-03-13 VITALS
HEIGHT: 68 IN | OXYGEN SATURATION: 97 % | TEMPERATURE: 98.2 F | BODY MASS INDEX: 26.37 KG/M2 | SYSTOLIC BLOOD PRESSURE: 138 MMHG | DIASTOLIC BLOOD PRESSURE: 78 MMHG | WEIGHT: 174 LBS | HEART RATE: 115 BPM

## 2018-03-13 DIAGNOSIS — R00.0 SINUS TACHYCARDIA: ICD-10-CM

## 2018-03-13 DIAGNOSIS — F41.1 GAD (GENERALIZED ANXIETY DISORDER): ICD-10-CM

## 2018-03-13 DIAGNOSIS — R21 RASH: ICD-10-CM

## 2018-03-13 DIAGNOSIS — J01.90 ACUTE SINUSITIS, RECURRENCE NOT SPECIFIED, UNSPECIFIED LOCATION: Primary | ICD-10-CM

## 2018-03-13 PROBLEM — J32.9 SINUSITIS: Status: ACTIVE | Noted: 2018-03-13

## 2018-03-13 PROBLEM — G89.29 CHRONIC PAIN: Status: RESOLVED | Noted: 2017-07-03 | Resolved: 2018-03-13

## 2018-03-13 PROCEDURE — 99214 OFFICE O/P EST MOD 30 MIN: CPT | Performed by: INTERNAL MEDICINE

## 2018-03-13 RX ORDER — ATENOLOL 50 MG/1
50 TABLET ORAL DAILY
Qty: 90 TABLET | Refills: 1 | Status: ON HOLD | OUTPATIENT
Start: 2018-03-13 | End: 2018-09-07 | Stop reason: DRUGHIGH

## 2018-03-13 RX ORDER — CLOTRIMAZOLE AND BETAMETHASONE DIPROPIONATE 10; .64 MG/G; MG/G
CREAM TOPICAL 2 TIMES DAILY
Qty: 15 G | Refills: 0 | Status: SHIPPED | OUTPATIENT
Start: 2018-03-13 | End: 2018-04-02 | Stop reason: SDUPTHER

## 2018-03-13 RX ORDER — ATENOLOL 50 MG/1
50 TABLET ORAL DAILY
Qty: 30 TABLET | Refills: 3 | Status: SHIPPED | OUTPATIENT
Start: 2018-03-13 | End: 2018-03-13 | Stop reason: SDUPTHER

## 2018-03-13 RX ORDER — AMOXICILLIN AND CLAVULANATE POTASSIUM 875; 125 MG/1; MG/1
1 TABLET, FILM COATED ORAL 2 TIMES DAILY
Qty: 20 TABLET | Refills: 0 | Status: SHIPPED | OUTPATIENT
Start: 2018-03-13 | End: 2018-03-23

## 2018-03-13 RX ORDER — AMOXICILLIN AND CLAVULANATE POTASSIUM 875; 125 MG/1; MG/1
1 TABLET, FILM COATED ORAL 2 TIMES DAILY
Qty: 20 TABLET | Refills: 0 | Status: SHIPPED | OUTPATIENT
Start: 2018-03-13 | End: 2018-03-13 | Stop reason: SDUPTHER

## 2018-03-13 RX ORDER — PROMETHAZINE HYDROCHLORIDE AND CODEINE PHOSPHATE 6.25; 1 MG/5ML; MG/5ML
SYRUP ORAL
Qty: 120 ML | Refills: 0 | Status: SHIPPED | OUTPATIENT
Start: 2018-03-13 | End: 2018-03-20

## 2018-03-13 ASSESSMENT — ENCOUNTER SYMPTOMS
COUGH: 1
BACK PAIN: 1
EYE DISCHARGE: 0
SORE THROAT: 0

## 2018-03-13 NOTE — PROGRESS NOTES
SUBJECTIVE:    Patient ID: Fidel Krishnamurthy is an 65 y.o. female. HPI: Patient here today for the f/u of chronic problems -- see Problem List and associated comments. New issues or complaints include (also see Assessment for more details): Symptoms compatible with sinus infection for several days. Pressure and drainage on the left side. Her heart rate gets fast when she gets sick. She could not tolerate the metoprolol. She also has an itchy rash on her feet and her distal feet tend to swell. Better with elevation and ice or cool compresses. She spends to get an injection for her back but could not because she felt ill. Review of Systems   Constitutional: Negative for fever. HENT: Positive for congestion. Negative for sore throat. Eyes: Negative for discharge. Respiratory: Positive for cough. Genitourinary: Negative for dysuria. Musculoskeletal: Positive for arthralgias and back pain. Skin: Positive for rash. OBJECTIVE:    /78 (Site: Left Arm, Position: Sitting, Cuff Size: Medium Adult)   Pulse 115   Temp 98.2 °F (36.8 °C) (Oral)   Ht 5' 8\" (1.727 m)   Wt 174 lb (78.9 kg)   SpO2 97%   BMI 26.46 kg/m²      Physical Exam   Constitutional: She is oriented to person, place, and time. She appears well-developed and well-nourished. HENT:   Mouth/Throat: Oropharynx is clear and moist. No oropharyngeal exudate. Nasal congestion and drainagemore on the left   Eyes: Right eye exhibits no discharge. Left eye exhibits no discharge. Neck: Normal range of motion. Neck supple. Cardiovascular: Regular rhythm. Tachycardia present. Exam reveals no gallop. No murmur heard. Pulses:       Radial pulses are 2+ on the right side, and 2+ on the left side. Pulmonary/Chest: Effort normal and breath sounds normal. No stridor. No respiratory distress. She has no wheezes. She has no rales. Abdominal: Soft. Bowel sounds are normal.   Musculoskeletal: She exhibits no edema.

## 2018-03-13 NOTE — ASSESSMENT & PLAN NOTE
Itchy rash on feet and her feet swell in this area. Better with cold sensation of her feet and elevation. Suspect tinea. Try Lotrisone.

## 2018-03-14 RX ORDER — LORAZEPAM 1 MG/1
TABLET ORAL
Qty: 120 TABLET | Refills: 0 | OUTPATIENT
Start: 2018-03-14 | End: 2018-04-11 | Stop reason: SDUPTHER

## 2018-03-15 ENCOUNTER — HOSPITAL ENCOUNTER (OUTPATIENT)
Dept: CARDIAC CATH/INVASIVE PROCEDURES | Age: 63
Discharge: HOME | End: 2018-03-15
Attending: ANESTHESIOLOGY | Admitting: ANESTHESIOLOGY

## 2018-03-15 DIAGNOSIS — M46.1 BILATERAL SACROILIITIS (HCC): ICD-10-CM

## 2018-03-15 NOTE — H&P
, Alert and No gross focal neurologic deficits      DIAGNOSIS: Sacroiliitis - bilateral    PROCEDURE: Sacroiliac joint injection Bilateral under fluoro. Discussed procedure, benefits, possible complications and alternatives with patient in detail, and agreed.     Gita Slade MD  Board Certified in Anesthesiology and Pain Medicine

## 2018-03-23 ENCOUNTER — TELEPHONE (OUTPATIENT)
Dept: PAIN MANAGEMENT | Age: 63
End: 2018-03-23

## 2018-03-23 ENCOUNTER — TELEPHONE (OUTPATIENT)
Dept: INTERNAL MEDICINE | Age: 63
End: 2018-03-23

## 2018-03-23 RX ORDER — NYSTATIN 100000 [USP'U]/G
POWDER TOPICAL
Qty: 60 G | Refills: 0 | Status: SHIPPED | OUTPATIENT
Start: 2018-03-23 | End: 2018-07-30

## 2018-03-23 NOTE — TELEPHONE ENCOUNTER
From  Sera Weaver To Sent  3/23/2018 10:02 AM   MAIA Chamberlain done.  Started atenolol  last Thursday. Pulse and B/P been fine.  Feet still not right, but better.  Will take diflucan today.  Can I try mycostatin powder on my feet also?  Used to use that for yeasty folds, moistness.  Worked pretty well. Vince Dowling so much, [employer]

## 2018-03-23 NOTE — TELEPHONE ENCOUNTER
PROCEDURE FOLLOW-UP     Procedure done: SIJ Bilateral   Date of procedure: 03/15/2018   Pain relief:  Yes / Benefit 25% / Ongoing   Side effects: No   Follow-up Appointment date: No     The patient states that she has been really sick lately and hasn't been able to move around much; so she thinks that when she starts to feel better and can be more active the pain will lessen even more.

## 2018-03-26 RX ORDER — FLUCONAZOLE 150 MG/1
TABLET ORAL
Qty: 1 TABLET | Refills: 1 | Status: SHIPPED | OUTPATIENT
Start: 2018-03-26 | End: 2018-04-16

## 2018-04-02 DIAGNOSIS — M47.27 LUMBOSACRAL SPONDYLOSIS WITH RADICULOPATHY: ICD-10-CM

## 2018-04-02 DIAGNOSIS — M46.1 BILATERAL SACROILIITIS (HCC): ICD-10-CM

## 2018-04-02 RX ORDER — CLOTRIMAZOLE AND BETAMETHASONE DIPROPIONATE 10; .64 MG/G; MG/G
CREAM TOPICAL 2 TIMES DAILY
Qty: 45 G | Refills: 0 | Status: SHIPPED | OUTPATIENT
Start: 2018-04-02 | End: 2018-04-16

## 2018-04-02 RX ORDER — DICYCLOMINE HCL 20 MG
TABLET ORAL
Qty: 60 TABLET | Refills: 0 | Status: SHIPPED | OUTPATIENT
Start: 2018-04-02 | End: 2018-04-22 | Stop reason: SDUPTHER

## 2018-04-02 RX ORDER — MELOXICAM 15 MG/1
15 TABLET ORAL DAILY
Qty: 30 TABLET | Refills: 0 | Status: SHIPPED | OUTPATIENT
Start: 2018-04-02 | End: 2018-04-04 | Stop reason: SDUPTHER

## 2018-04-04 ENCOUNTER — PATIENT MESSAGE (OUTPATIENT)
Dept: INTERNAL MEDICINE | Age: 63
End: 2018-04-04

## 2018-04-04 ENCOUNTER — OFFICE VISIT (OUTPATIENT)
Dept: PAIN MANAGEMENT | Age: 63
End: 2018-04-04

## 2018-04-04 ENCOUNTER — TELEPHONE (OUTPATIENT)
Dept: INTERNAL MEDICINE | Age: 63
End: 2018-04-04

## 2018-04-04 VITALS
BODY MASS INDEX: 26.37 KG/M2 | SYSTOLIC BLOOD PRESSURE: 114 MMHG | DIASTOLIC BLOOD PRESSURE: 73 MMHG | WEIGHT: 174 LBS | HEART RATE: 78 BPM | HEIGHT: 68 IN

## 2018-04-04 DIAGNOSIS — M47.27 LUMBOSACRAL SPONDYLOSIS WITH RADICULOPATHY: ICD-10-CM

## 2018-04-04 DIAGNOSIS — G89.29 OTHER CHRONIC PAIN: ICD-10-CM

## 2018-04-04 DIAGNOSIS — M46.1 BILATERAL SACROILIITIS (HCC): Primary | ICD-10-CM

## 2018-04-04 PROCEDURE — 99213 OFFICE O/P EST LOW 20 MIN: CPT | Performed by: ANESTHESIOLOGY

## 2018-04-04 RX ORDER — MELOXICAM 15 MG/1
15 TABLET ORAL DAILY
Qty: 30 TABLET | Refills: 0 | Status: SHIPPED | OUTPATIENT
Start: 2018-04-04 | End: 2018-05-02 | Stop reason: SDUPTHER

## 2018-04-04 RX ORDER — OXYCODONE HYDROCHLORIDE AND ACETAMINOPHEN 5; 325 MG/1; MG/1
1 TABLET ORAL 2 TIMES DAILY PRN
Qty: 30 TABLET | Refills: 0 | Status: SHIPPED | OUTPATIENT
Start: 2018-04-04 | End: 2018-07-09 | Stop reason: SDUPTHER

## 2018-04-04 ASSESSMENT — ENCOUNTER SYMPTOMS
WHEEZING: 0
SHORTNESS OF BREATH: 0
CONSTIPATION: 0
EYE REDNESS: 0
HEARTBURN: 0
EYE PAIN: 0
VOMITING: 0
COUGH: 0
EYE DISCHARGE: 0
ORTHOPNEA: 0
ABDOMINAL PAIN: 0
HEMOPTYSIS: 0
NAUSEA: 0
BACK PAIN: 1

## 2018-04-10 ENCOUNTER — TELEPHONE (OUTPATIENT)
Dept: INTERNAL MEDICINE | Age: 63
End: 2018-04-10

## 2018-04-11 DIAGNOSIS — F41.1 GAD (GENERALIZED ANXIETY DISORDER): ICD-10-CM

## 2018-04-11 RX ORDER — LORAZEPAM 1 MG/1
TABLET ORAL
Qty: 120 TABLET | Refills: 0 | OUTPATIENT
Start: 2018-04-11 | End: 2018-04-16 | Stop reason: SDUPTHER

## 2018-04-13 PROBLEM — J32.9 SINUSITIS: Status: RESOLVED | Noted: 2018-03-13 | Resolved: 2018-04-13

## 2018-04-16 ENCOUNTER — OFFICE VISIT (OUTPATIENT)
Dept: INTERNAL MEDICINE | Age: 63
End: 2018-04-16

## 2018-04-16 VITALS
HEIGHT: 68 IN | BODY MASS INDEX: 26.67 KG/M2 | SYSTOLIC BLOOD PRESSURE: 128 MMHG | HEART RATE: 70 BPM | DIASTOLIC BLOOD PRESSURE: 68 MMHG | WEIGHT: 176 LBS | OXYGEN SATURATION: 98 %

## 2018-04-16 DIAGNOSIS — I10 ESSENTIAL HYPERTENSION: ICD-10-CM

## 2018-04-16 DIAGNOSIS — R53.83 FATIGUE, UNSPECIFIED TYPE: ICD-10-CM

## 2018-04-16 DIAGNOSIS — F41.1 GAD (GENERALIZED ANXIETY DISORDER): ICD-10-CM

## 2018-04-16 PROBLEM — R21 RASH: Status: RESOLVED | Noted: 2018-03-13 | Resolved: 2018-04-16

## 2018-04-16 PROCEDURE — 99214 OFFICE O/P EST MOD 30 MIN: CPT | Performed by: INTERNAL MEDICINE

## 2018-04-16 RX ORDER — LORAZEPAM 1 MG/1
TABLET ORAL
Qty: 120 TABLET | Refills: 2 | Status: SHIPPED | OUTPATIENT
Start: 2018-04-16 | End: 2018-05-11 | Stop reason: SDUPTHER

## 2018-04-16 RX ORDER — CLONIDINE HYDROCHLORIDE 0.2 MG/1
0.1 TABLET ORAL 2 TIMES DAILY
Qty: 180 TABLET | Refills: 3 | COMMUNITY
Start: 2018-04-16 | End: 2018-05-21

## 2018-04-16 ASSESSMENT — ENCOUNTER SYMPTOMS
BACK PAIN: 1
SHORTNESS OF BREATH: 0

## 2018-05-02 DIAGNOSIS — M47.27 LUMBOSACRAL SPONDYLOSIS WITH RADICULOPATHY: ICD-10-CM

## 2018-05-02 DIAGNOSIS — M46.1 BILATERAL SACROILIITIS (HCC): ICD-10-CM

## 2018-05-02 RX ORDER — MELOXICAM 15 MG/1
15 TABLET ORAL DAILY
Qty: 30 TABLET | Refills: 0 | Status: SHIPPED | OUTPATIENT
Start: 2018-05-02 | End: 2018-07-30

## 2018-05-10 DIAGNOSIS — M47.816 FACET ARTHROPATHY, LUMBAR: ICD-10-CM

## 2018-05-10 RX ORDER — TIZANIDINE 4 MG/1
TABLET ORAL
Qty: 368 TABLET | Refills: 3 | Status: SHIPPED | OUTPATIENT
Start: 2018-05-10 | End: 2018-09-17 | Stop reason: SDUPTHER

## 2018-05-11 ENCOUNTER — TELEPHONE (OUTPATIENT)
Dept: INTERNAL MEDICINE | Age: 63
End: 2018-05-11

## 2018-05-11 DIAGNOSIS — F41.1 GAD (GENERALIZED ANXIETY DISORDER): ICD-10-CM

## 2018-05-11 RX ORDER — LORAZEPAM 1 MG/1
TABLET ORAL
Qty: 120 TABLET | Refills: 0 | OUTPATIENT
Start: 2018-05-11 | End: 2018-07-30 | Stop reason: SDUPTHER

## 2018-05-15 RX ORDER — DICYCLOMINE HCL 20 MG
TABLET ORAL
Qty: 60 TABLET | Refills: 2 | Status: SHIPPED | OUTPATIENT
Start: 2018-05-15 | End: 2018-08-03 | Stop reason: SDUPTHER

## 2018-05-16 RX ORDER — ESTRADIOL 2 MG/1
2 TABLET ORAL DAILY
Qty: 90 TABLET | Refills: 3 | Status: SHIPPED | OUTPATIENT
Start: 2018-05-16 | End: 2019-05-15 | Stop reason: SDUPTHER

## 2018-05-17 ENCOUNTER — PATIENT MESSAGE (OUTPATIENT)
Dept: INTERNAL MEDICINE | Age: 63
End: 2018-05-17

## 2018-05-17 ENCOUNTER — TELEPHONE (OUTPATIENT)
Dept: INTERNAL MEDICINE | Age: 63
End: 2018-05-17

## 2018-05-17 DIAGNOSIS — I10 ESSENTIAL HYPERTENSION: Primary | ICD-10-CM

## 2018-05-17 DIAGNOSIS — R53.83 FATIGUE, UNSPECIFIED TYPE: ICD-10-CM

## 2018-05-17 DIAGNOSIS — R06.02 SOB (SHORTNESS OF BREATH): ICD-10-CM

## 2018-05-17 DIAGNOSIS — R60.9 EDEMA, UNSPECIFIED TYPE: ICD-10-CM

## 2018-05-18 ENCOUNTER — TELEPHONE (OUTPATIENT)
Dept: INTERNAL MEDICINE | Age: 63
End: 2018-05-18

## 2018-05-18 ENCOUNTER — PATIENT MESSAGE (OUTPATIENT)
Dept: INTERNAL MEDICINE | Age: 63
End: 2018-05-18

## 2018-05-18 DIAGNOSIS — R53.83 FATIGUE, UNSPECIFIED TYPE: ICD-10-CM

## 2018-05-18 DIAGNOSIS — I10 ESSENTIAL HYPERTENSION: ICD-10-CM

## 2018-05-18 LAB
A/G RATIO: 1.9 (ref 1.1–2.2)
ALBUMIN SERPL-MCNC: 4.6 G/DL (ref 3.4–5)
ALP BLD-CCNC: 114 U/L (ref 40–129)
ALT SERPL-CCNC: 15 U/L (ref 10–40)
ANION GAP SERPL CALCULATED.3IONS-SCNC: 19 MMOL/L (ref 3–16)
AST SERPL-CCNC: 14 U/L (ref 15–37)
BASOPHILS ABSOLUTE: 0 K/UL (ref 0–0.2)
BASOPHILS RELATIVE PERCENT: 0.3 %
BILIRUB SERPL-MCNC: <0.2 MG/DL (ref 0–1)
BUN BLDV-MCNC: 19 MG/DL (ref 7–20)
CALCIUM SERPL-MCNC: 9.1 MG/DL (ref 8.3–10.6)
CHLORIDE BLD-SCNC: 93 MMOL/L (ref 99–110)
CO2: 23 MMOL/L (ref 21–32)
CORTISOL - AM: 6.3 UG/DL (ref 4.3–22.4)
CREAT SERPL-MCNC: 0.7 MG/DL (ref 0.6–1.2)
EOSINOPHILS ABSOLUTE: 0.2 K/UL (ref 0–0.6)
EOSINOPHILS RELATIVE PERCENT: 1.8 %
GFR AFRICAN AMERICAN: >60
GFR NON-AFRICAN AMERICAN: >60
GLOBULIN: 2.4 G/DL
GLUCOSE BLD-MCNC: 88 MG/DL (ref 70–99)
HCT VFR BLD CALC: 45.2 % (ref 36–48)
HEMOGLOBIN: 15.3 G/DL (ref 12–16)
LYMPHOCYTES ABSOLUTE: 3.8 K/UL (ref 1–5.1)
LYMPHOCYTES RELATIVE PERCENT: 43.6 %
MCH RBC QN AUTO: 33.8 PG (ref 26–34)
MCHC RBC AUTO-ENTMCNC: 33.9 G/DL (ref 31–36)
MCV RBC AUTO: 99.7 FL (ref 80–100)
MONOCYTES ABSOLUTE: 0.6 K/UL (ref 0–1.3)
MONOCYTES RELATIVE PERCENT: 7.2 %
NEUTROPHILS ABSOLUTE: 4.1 K/UL (ref 1.7–7.7)
NEUTROPHILS RELATIVE PERCENT: 47.1 %
PDW BLD-RTO: 13.1 % (ref 12.4–15.4)
PLATELET # BLD: 256 K/UL (ref 135–450)
PMV BLD AUTO: 8.3 FL (ref 5–10.5)
POTASSIUM SERPL-SCNC: 4.2 MMOL/L (ref 3.5–5.1)
PRO-BNP: 22 PG/ML (ref 0–124)
RBC # BLD: 4.54 M/UL (ref 4–5.2)
SODIUM BLD-SCNC: 135 MMOL/L (ref 136–145)
TOTAL PROTEIN: 7 G/DL (ref 6.4–8.2)
TSH REFLEX: 1.42 UIU/ML (ref 0.27–4.2)
WBC # BLD: 8.7 K/UL (ref 4–11)

## 2018-05-21 ENCOUNTER — TELEPHONE (OUTPATIENT)
Dept: INTERNAL MEDICINE | Age: 63
End: 2018-05-21

## 2018-05-21 DIAGNOSIS — K86.89 PANCREATIC INSUFFICIENCY: Primary | ICD-10-CM

## 2018-05-21 RX ORDER — CARVEDILOL 12.5 MG/1
TABLET ORAL
Qty: 180 TABLET | Refills: 3 | Status: SHIPPED | OUTPATIENT
Start: 2018-05-21 | End: 2018-06-04

## 2018-05-21 RX ORDER — CARVEDILOL 12.5 MG/1
12.5 TABLET ORAL 2 TIMES DAILY
Qty: 60 TABLET | Refills: 3 | Status: SHIPPED | OUTPATIENT
Start: 2018-05-21 | End: 2018-05-21 | Stop reason: SDUPTHER

## 2018-05-25 ENCOUNTER — HOSPITAL ENCOUNTER (OUTPATIENT)
Dept: CT IMAGING | Age: 63
Discharge: HOME | End: 2018-05-25
Attending: INTERNAL MEDICINE | Admitting: INTERNAL MEDICINE

## 2018-05-25 DIAGNOSIS — K86.89 OTHER SPECIFIED DISEASES OF PANCREAS (CODE): ICD-10-CM

## 2018-05-25 DIAGNOSIS — K86.89 PANCREATIC INSUFFICIENCY: ICD-10-CM

## 2018-05-29 ENCOUNTER — PATIENT MESSAGE (OUTPATIENT)
Dept: INTERNAL MEDICINE | Age: 63
End: 2018-05-29

## 2018-06-04 RX ORDER — NIFEDIPINE 30 MG/1
30 TABLET, EXTENDED RELEASE ORAL DAILY
Qty: 30 TABLET | Refills: 5 | Status: SHIPPED | OUTPATIENT
Start: 2018-06-04 | End: 2018-06-04 | Stop reason: SDUPTHER

## 2018-06-04 RX ORDER — FLUTICASONE PROPIONATE 50 MCG
SPRAY, SUSPENSION (ML) NASAL
Qty: 3 BOTTLE | Refills: 3 | Status: SHIPPED | OUTPATIENT
Start: 2018-06-04 | End: 2019-08-01

## 2018-06-04 RX ORDER — NIFEDIPINE 30 MG/1
30 TABLET, EXTENDED RELEASE ORAL DAILY
Qty: 90 TABLET | Refills: 5 | Status: SHIPPED | OUTPATIENT
Start: 2018-06-04 | End: 2019-06-17 | Stop reason: SDUPTHER

## 2018-06-04 RX ORDER — FLUTICASONE PROPIONATE 50 MCG
SPRAY, SUSPENSION (ML) NASAL
Qty: 1 BOTTLE | Refills: 3 | Status: SHIPPED | OUTPATIENT
Start: 2018-06-04 | End: 2018-06-04 | Stop reason: SDUPTHER

## 2018-06-11 ENCOUNTER — PATIENT MESSAGE (OUTPATIENT)
Dept: INTERNAL MEDICINE | Age: 63
End: 2018-06-11

## 2018-06-12 ENCOUNTER — TELEPHONE (OUTPATIENT)
Dept: INTERNAL MEDICINE | Age: 63
End: 2018-06-12

## 2018-06-12 ENCOUNTER — PATIENT MESSAGE (OUTPATIENT)
Dept: INTERNAL MEDICINE | Age: 63
End: 2018-06-12

## 2018-06-13 RX ORDER — BUTALBITAL, ACETAMINOPHEN AND CAFFEINE 50; 325; 40 MG/1; MG/1; MG/1
1 TABLET ORAL EVERY 6 HOURS PRN
Qty: 20 TABLET | Refills: 0 | OUTPATIENT
Start: 2018-06-13 | End: 2018-09-25 | Stop reason: SDUPTHER

## 2018-06-25 DIAGNOSIS — M47.27 LUMBOSACRAL SPONDYLOSIS WITH RADICULOPATHY: ICD-10-CM

## 2018-06-25 DIAGNOSIS — M46.1 BILATERAL SACROILIITIS (HCC): ICD-10-CM

## 2018-06-25 RX ORDER — MELOXICAM 15 MG/1
15 TABLET ORAL DAILY
Qty: 30 TABLET | Refills: 0 | Status: SHIPPED | OUTPATIENT
Start: 2018-06-25 | End: 2018-07-31 | Stop reason: SDUPTHER

## 2018-07-10 ENCOUNTER — HOSPITAL ENCOUNTER (OUTPATIENT)
Dept: GENERAL RADIOLOGY | Age: 63
Discharge: HOME | End: 2018-07-10
Attending: INTERNAL MEDICINE | Admitting: INTERNAL MEDICINE

## 2018-07-10 DIAGNOSIS — K44.9 HIATAL HERNIA: ICD-10-CM

## 2018-07-10 DIAGNOSIS — K44.9 DIAPHRAGMATIC HERNIA WITHOUT OBSTRUCTION OR GANGRENE: ICD-10-CM

## 2018-07-17 ENCOUNTER — HOSPITAL ENCOUNTER (OUTPATIENT)
Dept: ENDOSCOPY | Age: 63
Discharge: HOME | End: 2018-07-17
Attending: INTERNAL MEDICINE | Admitting: INTERNAL MEDICINE

## 2018-07-17 ASSESSMENT — LIFESTYLE VARIABLES: SMOKING_STATUS: 1

## 2018-07-17 NOTE — ANESTHESIA PRE-OP
(MAXZIDE-25) 37.5-25 MG per tablet TAKE 1 TABLET BY MOUTH EVERY DAY 90 tablet 1    albuterol sulfate HFA (PROVENTIL HFA) 108 (90 Base) MCG/ACT inhaler Inhale 2 puffs into the lungs every 6 hours as needed for Wheezing 1 Inhaler 3    medroxyPROGESTERone (PROVERA) 2.5 MG tablet TAKE 1 &1/2 TABLETS BY MOUTH EVERY DAY (Patient taking differently: Take 1.25 mg by mouth 2 times daily ) 135 tablet 1     No current facility-administered medications for this encounter. Allergies:     Allergies   Allergen Reactions    Cipro Xr      N/V    Macrodantin [Nitrofurantoin]     Morphine      headache       Problem List:    Patient Active Problem List   Diagnosis Code    KEVIN (generalized anxiety disorder) F41.1    Depressive disorder F32.9    Fibromyalgia M79.7    Fatigue R53.83    Cigarette smoker F17.210    Essential hypertension I10    Hyperlipidemia E78.5    Spondylosis of lumbar region without myelopathy or radiculopathy M47.816    Chronic, continuous use of opioids F11.90    Lumbosacral spondylosis with radiculopathy M47.27    Chronic back pain M54.9, G89.29    Degenerative spondylolisthesis M43.10    Chronic prescription opiate use Z79.891    Gastroesophageal reflux disease without esophagitis K21.9    Primary insomnia F51.01    Sinus tachycardia R00.0       Past Medical History:        Diagnosis Date    Anxiety state, unspecified     Chronic back pain     Tried spinal injections    Depressive disorder, not elsewhere classified     Duodenal ulcer, unspecified as acute or chronic, without hemorrhage, perforation, or obstruction     Essential hypertension 2/22/2016    Fibromyalgia     GERD (gastroesophageal reflux disease)     Sleep disorder        Past Surgical History:        Procedure Laterality Date    ABDOMINAL ADHESION SURGERY      CARDIOVASCULAR STRESS TEST  02/2016    negative    CHOLECYSTECTOMY      COLONOSCOPY  9/13    negative    HYSTERECTOMY      TONSILLECTOMY      UPPER

## 2018-07-30 ENCOUNTER — OFFICE VISIT (OUTPATIENT)
Dept: INTERNAL MEDICINE | Age: 63
End: 2018-07-30

## 2018-07-30 VITALS
WEIGHT: 177 LBS | OXYGEN SATURATION: 97 % | DIASTOLIC BLOOD PRESSURE: 72 MMHG | HEART RATE: 87 BPM | BODY MASS INDEX: 26.83 KG/M2 | HEIGHT: 68 IN | SYSTOLIC BLOOD PRESSURE: 118 MMHG

## 2018-07-30 DIAGNOSIS — R00.0 SINUS TACHYCARDIA: ICD-10-CM

## 2018-07-30 DIAGNOSIS — K21.9 GASTROESOPHAGEAL REFLUX DISEASE WITHOUT ESOPHAGITIS: ICD-10-CM

## 2018-07-30 DIAGNOSIS — K44.9 HIATAL HERNIA: ICD-10-CM

## 2018-07-30 DIAGNOSIS — I10 ESSENTIAL HYPERTENSION: ICD-10-CM

## 2018-07-30 DIAGNOSIS — Z12.31 ENCOUNTER FOR SCREENING MAMMOGRAM FOR BREAST CANCER: ICD-10-CM

## 2018-07-30 DIAGNOSIS — F41.1 GAD (GENERALIZED ANXIETY DISORDER): Primary | ICD-10-CM

## 2018-07-30 PROCEDURE — 99214 OFFICE O/P EST MOD 30 MIN: CPT | Performed by: INTERNAL MEDICINE

## 2018-07-30 RX ORDER — LORAZEPAM 1 MG/1
TABLET ORAL
Qty: 120 TABLET | Refills: 2 | Status: SHIPPED | OUTPATIENT
Start: 2018-07-30 | End: 2018-11-29

## 2018-07-30 ASSESSMENT — ENCOUNTER SYMPTOMS
TROUBLE SWALLOWING: 0
NAUSEA: 1
ABDOMINAL DISTENTION: 1
RESPIRATORY NEGATIVE: 1
BACK PAIN: 1
VOMITING: 0

## 2018-07-31 DIAGNOSIS — M46.1 BILATERAL SACROILIITIS (HCC): ICD-10-CM

## 2018-07-31 DIAGNOSIS — M47.27 LUMBOSACRAL SPONDYLOSIS WITH RADICULOPATHY: ICD-10-CM

## 2018-08-01 RX ORDER — MELOXICAM 15 MG/1
15 TABLET ORAL DAILY
Qty: 30 TABLET | Refills: 0 | Status: SHIPPED | OUTPATIENT
Start: 2018-08-01 | End: 2018-08-28 | Stop reason: SDUPTHER

## 2018-08-01 NOTE — TELEPHONE ENCOUNTER
MEDICATION ISSUES    ALL MEDICATION REFILLS NEED 48-HOUR ADVANCED NOTICE - Not filled on weekends or holidays.      Issue:  Medication Refill   Requesting Refill: Yes    Medication requested: Mobic    Reporting Side effects: No / If other, Describe: None    Last Written Date:  06/25/18   OARRS done: NA   Follow-up Appointment date: None

## 2018-08-03 RX ORDER — DICYCLOMINE HCL 20 MG
TABLET ORAL
Qty: 60 TABLET | Refills: 0 | Status: SHIPPED | OUTPATIENT
Start: 2018-08-03 | End: 2018-09-19 | Stop reason: SDUPTHER

## 2018-08-12 ENCOUNTER — PATIENT MESSAGE (OUTPATIENT)
Dept: INTERNAL MEDICINE | Age: 63
End: 2018-08-12

## 2018-08-13 ENCOUNTER — PATIENT MESSAGE (OUTPATIENT)
Dept: PAIN MANAGEMENT | Age: 63
End: 2018-08-13

## 2018-08-13 DIAGNOSIS — F41.1 GAD (GENERALIZED ANXIETY DISORDER): Primary | ICD-10-CM

## 2018-08-13 RX ORDER — LORAZEPAM 1 MG/1
TABLET ORAL
Qty: 150 TABLET | Refills: 0 | OUTPATIENT
Start: 2018-08-13 | End: 2018-08-16

## 2018-08-13 NOTE — TELEPHONE ENCOUNTER
Note      She can take the Ativan 1 mg 3 times a day and 2 mg at bedtime.   Change Rx to following:  Ativan 1 mg #150     one t.i.d. p.r.n. during the day and 2 at bedtime

## 2018-08-15 ENCOUNTER — TELEPHONE (OUTPATIENT)
Dept: INTERNAL MEDICINE | Age: 63
End: 2018-08-15

## 2018-08-15 DIAGNOSIS — M54.9 OTHER CHRONIC BACK PAIN: ICD-10-CM

## 2018-08-15 DIAGNOSIS — G89.29 OTHER CHRONIC BACK PAIN: ICD-10-CM

## 2018-08-15 DIAGNOSIS — M47.816 SPONDYLOSIS OF LUMBAR REGION WITHOUT MYELOPATHY OR RADICULOPATHY: Primary | ICD-10-CM

## 2018-08-16 ENCOUNTER — OFFICE VISIT (OUTPATIENT)
Dept: SURGERY | Age: 63
End: 2018-08-16

## 2018-08-16 VITALS — WEIGHT: 179 LBS | SYSTOLIC BLOOD PRESSURE: 130 MMHG | DIASTOLIC BLOOD PRESSURE: 66 MMHG | BODY MASS INDEX: 27.22 KG/M2

## 2018-08-16 DIAGNOSIS — K44.9 HIATAL HERNIA: Primary | ICD-10-CM

## 2018-08-16 DIAGNOSIS — K21.9 GASTROESOPHAGEAL REFLUX DISEASE WITHOUT ESOPHAGITIS: ICD-10-CM

## 2018-08-16 PROCEDURE — 99203 OFFICE O/P NEW LOW 30 MIN: CPT | Performed by: SURGERY

## 2018-08-16 ASSESSMENT — ENCOUNTER SYMPTOMS
PHOTOPHOBIA: 1
RHINORRHEA: 1
NAUSEA: 1
ABDOMINAL DISTENTION: 1
ABDOMINAL PAIN: 1
EYE DISCHARGE: 1
BACK PAIN: 1
SHORTNESS OF BREATH: 1
COUGH: 1
TROUBLE SWALLOWING: 1
DIARRHEA: 1
SINUS PRESSURE: 1
FACIAL SWELLING: 1
CHEST TIGHTNESS: 1
EYE ITCHING: 1

## 2018-08-16 NOTE — PROGRESS NOTES
[facility] GENERAL AND LAPAROSCOPIC SURGERY                       PATIENT NAME: Sera Weaver        TODAY'S DATE: 8/16/2018    Reason for Consult:  Hiatal Hernia    Requesting Physician / PCP:  Dr. Anamaria Beckham / Dr. Floridalma Wright:              The patient is a 59 y.o. female who presents with a hiatal hernia. Has had reflux symptoms recurring, nausea, and regurgitation. No bleeding noted, has some diarrhea. Has had PUD in the past.  Recent EGD with [facility] noted. 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 Past Medical History:        Diagnosis Date    Anxiety state, unspecified     Chronic back pain     Tried spinal injections    Depressive disorder, not elsewhere classified     Duodenal ulcer, unspecified as acute or chronic, without hemorrhage, perforation, or obstruction     Essential hypertension 2/22/2016    Fibromyalgia     GERD (gastroesophageal reflux disease)     Sleep disorder        Past Surgical History:        Procedure Laterality Date    ABDOMINAL ADHESION SURGERY      CARDIOVASCULAR STRESS TEST  02/2016    negative    CHOLECYSTECTOMY      COLONOSCOPY  9/13    negative    HYSTERECTOMY      TONSILLECTOMY      UPPER GASTROINTESTINAL ENDOSCOPY  9/13    small paraesoph hernia        Current Medications:   No current facility-administered medications for this visit. Prior to Admission medications    Medication Sig Start Date End Date Taking?  Authorizing Provider   dicyclomine (BENTYL) 20 MG tablet TAKE 1 TABLET EVERY 6 HOURS 8/3/18  Yes Marylen Keys, MD   meloxicam (MOBIC) 15 MG tablet TAKE 1 TABLET BY MOUTH DAILY WITH FOOD 8/1/18  Yes Kelly Hi MD   LORazepam (ATIVAN) 1 MG tablet TAKE 1 TABLET BY MOUTH EVERY 6 HOURS AS NEEDED. 7/30/18 11/29/18 Yes Marylen Keys, MD   cloNIDine (CATAPRES) 0.2 MG tablet Take 0.5 tablets by mouth nightly 7/30/18  Yes Marylen Keys, MD   omeprazole (PRILOSEC) 40 MG delayed release capsule TAKE 1 CAPSULE BY MOUTH TWICE DAILY 6/25/18  Yes Nika Seaman MD   NIFEdipine (PROCARDIA XL) 30 MG extended release tablet TAKE 1 TABLET BY MOUTH DAILY 6/4/18  Yes Nika Seaman MD   fluticasone [facility]) 50 MCG/ACT nasal spray SHAKE LIQUID WELL AND USE 2 SPRAYS IN EACH NOSTRIL EVERY DAY 6/4/18  Yes Nika Seaman MD   estradiol (ESTRACE) 2 MG tablet Take 1 tablet by mouth daily 5/16/18  Yes Nika Seaman MD   tiZANidine (ZANAFLEX) 4 MG tablet TAKE 1 TABLET BY MOUTH EVERY 6 HOURS AS NEEDED FOR PAIN 5/10/18  Yes Nika Seaman MD   atenolol (TENORMIN) 50 MG tablet Take 1 tablet by mouth daily 3/13/18  Yes Nika Seaman MD   citalopram (CELEXA) 20 MG tablet Take 1 tablet by mouth daily 2/13/18  Yes Nika Seaman MD   triamterene-hydrochlorothiazide (WVSWQJA-45) 37.5-25 MG per tablet TAKE 1 TABLET BY MOUTH EVERY DAY 1/29/18  Yes Nika Seaman MD   medroxyPROGESTERone (PROVERA) 2.5 MG tablet TAKE 1 &1/2 TABLETS BY MOUTH EVERY DAY  Patient taking differently: Take 1.25 mg by mouth 2 times daily  5/14/13  Yes Nika Seaman MD   butalbital-acetaminophen-caffeine (FIORICET, ESGIC) -51 MG per tablet Take 1 tablet by mouth every 6 hours as needed for Headaches 6/13/18   Nika Seaman MD   albuterol sulfate HFA (PROVENTIL HFA) 108 (90 Base) MCG/ACT inhaler Inhale 2 puffs into the lungs every 6 hours as needed for Wheezing 8/16/17   Nika Seaman MD        Allergies:  Cipro xr; Macrodantin [nitrofurantoin]; and Morphine    Social History:    reports that she has been smoking Cigarettes. She has a 40.00 pack-year smoking history. She has never used smokeless tobacco. She reports that she drinks about 0.6 oz of alcohol per week . She reports that she does not use drugs.     Family History:    Family History   Problem Relation Age of Onset    Heart Disease Mother     Other Mother     Stroke Father     Colon Cancer Father        REVIEW OF SYSTEMS:  Review of Systems   Constitutional: Positive for activity change, appetite change, diaphoresis and fatigue. HENT: Positive for facial swelling, mouth sores, postnasal drip, rhinorrhea, sinus pressure, sneezing and trouble swallowing. Eyes: Positive for photophobia, discharge and itching. Respiratory: Positive for cough, chest tightness and shortness of breath. Cardiovascular: Positive for chest pain, palpitations and leg swelling. Gastrointestinal: Positive for abdominal distention, abdominal pain, diarrhea and nausea. Endocrine: Positive for heat intolerance. Genitourinary: Positive for enuresis and pelvic pain. Musculoskeletal: Positive for arthralgias, back pain, myalgias, neck pain and neck stiffness. Skin: Positive for rash. Allergic/Immunologic: Positive for environmental allergies. Neurological: Positive for headaches. Hematological: Negative. Psychiatric/Behavioral: Positive for decreased concentration and sleep disturbance. The patient is nervous/anxious.         PHYSICAL EXAM:  VITALS:  /66   Wt 179 lb (81.2 kg)   BMI 27.22 kg/m²   CONSTITUTIONAL:  alert, no apparent distress and normal weight  EYES:  sclera clear  ENT:  normocepalic, without obvious abnormality  NECK:  supple, symmetrical, trachea midline and no mass noted  LUNGS:  clear to auscultation  CARDIOVASCULAR:  regular rate and rhythm and no murmur noted  ABDOMEN:  scars noted are healed, normal bowel sounds, soft, non-distended, non-tender, voluntary guarding absent, no masses palpated, no hepatosplenomegally and hernia absent  MUSCULOSKELETAL:  0+ pitting edema lower extremities  NEUROLOGIC:  Mental Status Exam:  Level of Alertness:   awake  Orientation:   person, place, time  SKIN:  no bruising or bleeding, normal skin color, texture, turgor, no redness, warmth, or swelling and no jaundice        Radiology Review:     EXAMINATION:   DOUBLE CONTRAST ESOPHAGRAM       7/10/2018 9:44 am       TECHNIQUE:   Double contrast esophagram was performed with barium and air contrast.       FLUOROSCOPY DOSE AND TYPE

## 2018-08-16 NOTE — PATIENT INSTRUCTIONS
Patient Education        Learning About Benefits From Quitting Smoking  How does quitting smoking make you healthier? If you're thinking about quitting smoking, you may have a few reasons to be smoke-free. Your health may be one of them. · When you quit smoking, you lower your risks for cancer, lung disease, heart attack, stroke, blood vessel disease, and blindness from macular degeneration. · When you're smoke-free, you get sick less often, and you heal faster. You are less likely to get colds, flu, bronchitis, and pneumonia. · As a nonsmoker, you may find that your mood is better and you are less stressed. When and how will you feel healthier? Quitting has real health benefits that start from day 1 of being smoke-free. And the longer you stay smoke-free, the healthier you get and the better you feel. The first hours  · After just 20 minutes, your blood pressure and heart rate go down. That means there's less stress on your heart and blood vessels. · Within 12 hours, the level of carbon monoxide in your blood drops back to normal. That makes room for more oxygen. With more oxygen in your body, you may notice that you have more energy than when you smoked. After 2 weeks  · Your lungs start to work better. · Your risk of heart attack starts to drop. After 1 month  · When your lungs are clear, you cough less and breathe deeper, so it's easier to be active. · Your sense of taste and smell return. That means you can enjoy food more than you have since you started smoking. Over the years  · After 1 year, your risk of heart disease is half what it would be if you kept smoking. · After 5 years, your risk of stroke starts to shrink. Within a few years after that, it's about the same as if you'd never smoked. · After 10 years, your risk of dying from lung cancer is cut by about half. And your risk for many other types of cancer is lower too. How would quitting help others in your life?   When you quit smoking, you improve the health of everyone who now breathes in your smoke. · Their heart, lung, and cancer risks drop, much like yours. · They are sick less. For babies and small children, living smoke-free means they're less likely to have ear infections, pneumonia, and bronchitis. · If you're a woman who is or will be pregnant someday, quitting smoking means a healthier . · Children who are close to you are less likely to become adult smokers. Where can you learn more? Go to https://Annex ProductspeHeartThis.[employer]. org and sign in to your [address] account. Enter [phone] in the [facility] box to learn more about \"Learning About Benefits From Quitting Smoking. \"     If you do not have an account, please click on the \"Sign Up Now\" link. Current as of: 2017  Content Version: 11.7  © 9179-5607 [employer]. Care instructions adapted under license by [facility] ([facility]). If you have questions about a medical condition or this instruction, always ask your healthcare professional. [address] any warranty or liability for your use of this information.

## 2018-08-17 ENCOUNTER — TELEPHONE (OUTPATIENT)
Dept: INTERNAL MEDICINE | Age: 63
End: 2018-08-17

## 2018-08-17 ENCOUNTER — PATIENT MESSAGE (OUTPATIENT)
Dept: INTERNAL MEDICINE | Age: 63
End: 2018-08-17

## 2018-08-28 DIAGNOSIS — M47.27 LUMBOSACRAL SPONDYLOSIS WITH RADICULOPATHY: ICD-10-CM

## 2018-08-28 DIAGNOSIS — M46.1 BILATERAL SACROILIITIS (HCC): ICD-10-CM

## 2018-08-28 RX ORDER — MELOXICAM 15 MG/1
15 TABLET ORAL DAILY
Qty: 30 TABLET | Refills: 0 | Status: SHIPPED | OUTPATIENT
Start: 2018-08-28 | End: 2018-10-29 | Stop reason: SDUPTHER

## 2018-08-29 RX ORDER — TRIAMTERENE AND HYDROCHLOROTHIAZIDE 37.5; 25 MG/1; MG/1
TABLET ORAL
Qty: 90 TABLET | Refills: 1 | Status: SHIPPED | OUTPATIENT
Start: 2018-08-29 | End: 2019-02-18 | Stop reason: SDUPTHER

## 2018-08-31 ENCOUNTER — TELEPHONE (OUTPATIENT)
Dept: SURGERY | Age: 63
End: 2018-08-31

## 2018-08-31 NOTE — TELEPHONE ENCOUNTER
Pt scheduled for surgery 9-7-18. Calls today stating she received a call from the hospital to go over surgery instructions. They told the patient to contact our office regarding her Meloxicam. Asking if it ok per Dr. Royal Segovia if the patient can take medication up to surgery date. States it is the only thing she has to take for pain. Please advise.

## 2018-09-10 ENCOUNTER — TELEPHONE (OUTPATIENT)
Dept: SURGERY | Age: 63
End: 2018-09-10

## 2018-09-10 ENCOUNTER — TELEPHONE (OUTPATIENT)
Dept: PHARMACY | Facility: CLINIC | Age: 63
End: 2018-09-10

## 2018-09-10 DIAGNOSIS — K44.9 PARAESOPHAGEAL HIATAL HERNIA: Primary | ICD-10-CM

## 2018-09-10 RX ORDER — LANOLIN ALCOHOL/MO/W.PET/CERES
6-9 CREAM (GRAM) TOPICAL NIGHTLY PRN
COMMUNITY

## 2018-09-10 RX ORDER — DOCUSATE SODIUM 100 MG/1
100 CAPSULE, LIQUID FILLED ORAL 3 TIMES DAILY PRN
COMMUNITY

## 2018-09-10 RX ORDER — MEDROXYPROGESTERONE ACETATE 2.5 MG/1
2.5 TABLET ORAL DAILY
COMMUNITY
End: 2019-04-10

## 2018-09-10 RX ORDER — LEVOCETIRIZINE DIHYDROCHLORIDE 5 MG/1
5 TABLET, FILM COATED ORAL DAILY
COMMUNITY
End: 2019-11-01

## 2018-09-10 NOTE — TELEPHONE ENCOUNTER
CLINICAL PHARMACY NOTE  Post-Discharge Transitions of Care (LUCIAN)    Non-face-to-face services provided:  Obtained and reviewed discharge summary and/or continuity of care documents  Assessment and support for treatment adherence and medication management-Medication Reconciliation    Subjective/Objective:  Santiago Wiggins is a 59 y.o. female. Patient was discharged from [facility] on 9/9/18 with a diagnosis of hiatal hernia repair. Patient outreach to review discharge medications and provide medication review and management. Spoke with patient    Allergies   Allergen Reactions    Macrodantin [Nitrofurantoin]      fever    Morphine      headache    Cipro Xr Nausea And Vomiting     If given iv, can not take it orally       Discharge Medications (as per discharging medication list found on after discharge summary):  Medication Sig Comments    melatonin 3 MG TABS tablet Take 6-9 mg by mouth nightly as needed  Taking 2-3 per night as stated by patient. Takes most nights per patient. Added per patient.  levocetirizine (XYZAL) 5 MG tablet Take 5 mg by mouth daily Taking this medication as stated by patient. Added per patient     docusate sodium (COLACE) 100 MG capsule Take 100 mg by mouth 3 times daily as needed for Constipation Taking this medication as stated by patient. Added per patient.  acetaminophen (TYLENOL) 500 MG tablet Take 1,000 mg by mouth every 6 hours as needed for Pain Taking as prescribed  Not taking with Norco      Coenzyme Q10 (CO Q 10 PO) Take 200 mg by mouth 4 times daily Taking as prescribed        KRILL OIL PO Take 1,085 mg by mouth daily Taking as prescribed        magnesium citrate solution Take 296 mLs by mouth 2 times daily Taking as prescribed        Potassium (POTASSIMIN PO) Take 285 mg by mouth 2 times daily Not taking as prescribed  Patient stated they are not taking currently due to Potassium given during hospital stay.     Multiple Vitamins-Minerals

## 2018-09-10 NOTE — TELEPHONE ENCOUNTER
CLINICAL PHARMACY NOTE  Post-Discharge Transitions of Care (LUCIAN)    Called [facility]'s pharmacy and confirmed that patient picked up medroxyprogesterone 2.5 once daily on 1/23/18 for a 90-day supply. Confirmed that this prescription was written by Dr. Giovana Sesay. Contacted Dr. Meron Hope office at [phone] and told them that this patient stated she was taking 0.625 mg daily to confirm that this was the appropriate dose. Nurse stated that they have not seen this patient since the original prescription was written in 4/17 and no dose changes have occurred. Also stated that the patient would need another appointment if she were to receive a new prescription. Changed the medroxyprogesterone 2.5 mg order on the medication list to the directions that the pharmacy and [facility]'s office confirmed but left a note stating how the patient claims she is taking this medication. Correct directions are take one tablet (2.5 mg) daily. Also believe that the unknown cream on the medication list is Lotrisone but patient did not confirm.     rBady Chakraborty, PharmD Candidate [id number]  Phone: [phone] [address]

## 2018-09-12 ENCOUNTER — PATIENT MESSAGE (OUTPATIENT)
Dept: SURGERY | Age: 63
End: 2018-09-12

## 2018-09-12 NOTE — TELEPHONE ENCOUNTER
From: Sera Weaver  To: Adama eSars MD  Sent: 9/12/2018 12:13 PM EDT  Subject: Prescription Question    Hi Ariana, I started taking 2 vicodin every 6 hrs Monday afternoon. This is working well with the Advil. I have enough until tomorrow morning. May I have a refill for enough to get me through the weekend. I should be ok with just the Advil then. Tell doc the incisions look good, and I am swallowing well.  Thanks, Kim

## 2018-09-13 DIAGNOSIS — G89.18 PAIN AT SURGICAL SITE: Primary | ICD-10-CM

## 2018-09-13 RX ORDER — HYDROCODONE BITARTRATE AND ACETAMINOPHEN 5; 325 MG/1; MG/1
2 TABLET ORAL EVERY 6 HOURS PRN
Qty: 20 TABLET | Refills: 0 | Status: SHIPPED | OUTPATIENT
Start: 2018-09-13 | End: 2018-09-20

## 2018-09-13 NOTE — TELEPHONE ENCOUNTER
Called and talked to the pt. She is doing well with the 2 norco every 6 hrs as directed on Monday. She is wanting to know if she can get a small refill to get her through the weekend and she will continue with the advil starting Mon.     Please advise

## 2018-09-17 DIAGNOSIS — M47.816 FACET ARTHROPATHY, LUMBAR: ICD-10-CM

## 2018-09-17 RX ORDER — OMEPRAZOLE 40 MG/1
CAPSULE, DELAYED RELEASE ORAL
Qty: 180 CAPSULE | Refills: 1 | Status: SHIPPED | OUTPATIENT
Start: 2018-09-17 | End: 2019-03-13 | Stop reason: SDUPTHER

## 2018-09-17 RX ORDER — TIZANIDINE 4 MG/1
TABLET ORAL
Qty: 90 TABLET | Refills: 1 | Status: SHIPPED | OUTPATIENT
Start: 2018-09-17 | End: 2019-02-04 | Stop reason: SDUPTHER

## 2018-09-17 RX ORDER — IBUPROFEN 400 MG/1
TABLET ORAL
Qty: 352 TABLET | Refills: 2 | Status: SHIPPED | OUTPATIENT
Start: 2018-09-17 | End: 2019-11-20 | Stop reason: SDUPTHER

## 2018-09-20 RX ORDER — DICYCLOMINE HCL 20 MG
TABLET ORAL
Qty: 60 TABLET | Refills: 0 | Status: SHIPPED | OUTPATIENT
Start: 2018-09-20 | End: 2018-10-16 | Stop reason: SDUPTHER

## 2018-09-24 ENCOUNTER — TELEPHONE (OUTPATIENT)
Dept: INTERNAL MEDICINE CLINIC | Age: 63
End: 2018-09-24

## 2018-09-24 DIAGNOSIS — F41.1 GAD (GENERALIZED ANXIETY DISORDER): ICD-10-CM

## 2018-09-24 RX ORDER — LORAZEPAM 1 MG/1
TABLET ORAL
Qty: 150 TABLET | Refills: 0 | OUTPATIENT
Start: 2018-09-24 | End: 2018-12-19 | Stop reason: SDUPTHER

## 2018-09-25 ENCOUNTER — TELEPHONE (OUTPATIENT)
Dept: INTERNAL MEDICINE CLINIC | Age: 63
End: 2018-09-25

## 2018-09-25 ENCOUNTER — OFFICE VISIT (OUTPATIENT)
Dept: SURGERY | Age: 63
End: 2018-09-25

## 2018-09-25 VITALS — SYSTOLIC BLOOD PRESSURE: 117 MMHG | DIASTOLIC BLOOD PRESSURE: 70 MMHG | WEIGHT: 180 LBS | BODY MASS INDEX: 26.97 KG/M2

## 2018-09-25 DIAGNOSIS — K44.9 PARAESOPHAGEAL HIATAL HERNIA: Primary | ICD-10-CM

## 2018-09-25 PROCEDURE — 99024 POSTOP FOLLOW-UP VISIT: CPT | Performed by: SURGERY

## 2018-09-25 RX ORDER — TRAMADOL HYDROCHLORIDE 50 MG/1
TABLET ORAL
COMMUNITY
Start: 2005-05-02 | End: 2019-04-10

## 2018-09-25 RX ORDER — BUTALBITAL, ACETAMINOPHEN AND CAFFEINE 50; 325; 40 MG/1; MG/1; MG/1
1 TABLET ORAL EVERY 6 HOURS PRN
Qty: 20 TABLET | Refills: 0 | OUTPATIENT
Start: 2018-09-25 | End: 2018-10-05 | Stop reason: SDUPTHER

## 2018-09-25 NOTE — PATIENT INSTRUCTIONS
smoking, you improve the health of everyone who now breathes in your smoke. · Their heart, lung, and cancer risks drop, much like yours. · They are sick less. For babies and small children, living smoke-free means they're less likely to have ear infections, pneumonia, and bronchitis. · If you're a woman who is or will be pregnant someday, quitting smoking means a healthier . · Children who are close to you are less likely to become adult smokers. Where can you learn more? Go to https://EngineLabpeQuantumID Technologies.Comprehensive Care. org and sign in to your [employer] account. Enter [phone] in the [facility] box to learn more about \"Learning About Benefits From Quitting Smoking. \"     If you do not have an account, please click on the \"Sign Up Now\" link. Current as of: 2017  Content Version: 11.7  © 9009-5409 [employer]. Care instructions adapted under license by [facility] ([facility]). If you have questions about a medical condition or this instruction, always ask your healthcare professional. [address] any warranty or liability for your use of this information.

## 2018-09-25 NOTE — PROGRESS NOTES
[facility] GENERAL AND LAPAROSCOPIC SURGERY          PATIENT NAME: Sera Weaver     TODAY'S DATE: 9/25/2018    SUBJECTIVE:  S/P Lap paraesophageal HH repair 9/7/2018    Pt feeling well post op. Breathing better, no N/V. Drinking and eating well, no co reflux or regurgitation. OBJECTIVE:  VITALS:  /70   Wt 180 lb (81.6 kg)   BMI 26.97 kg/m²     CONSTITUTIONAL:  awake and alert  LUNGS:  clear to auscultation  HEART: RRR  ABDOMEN:  normal bowel sounds, soft, non-distended, non-tender, incisions healed     Data:    Radiology Review:  None      ASSESSMENT AND PLAN:  S/P Lap Paraesophageal HH repair, Nissen  Doing well  Diet and activity reviewed, all Q answered.  Pt pleased with surgery  Will fu prn    Milady Miguel

## 2018-09-30 ENCOUNTER — PATIENT MESSAGE (OUTPATIENT)
Dept: SURGERY | Age: 63
End: 2018-09-30

## 2018-10-02 RX ORDER — METRONIDAZOLE 500 MG/1
500 TABLET ORAL 3 TIMES DAILY
Qty: 30 TABLET | Refills: 0 | Status: SHIPPED | OUTPATIENT
Start: 2018-10-02 | End: 2018-10-12

## 2018-10-05 RX ORDER — ATENOLOL 50 MG/1
50 TABLET ORAL DAILY
Qty: 90 TABLET | Refills: 1 | Status: SHIPPED | OUTPATIENT
Start: 2018-10-05 | End: 2018-11-16 | Stop reason: ALTCHOICE

## 2018-10-05 RX ORDER — BUTALBITAL, ACETAMINOPHEN AND CAFFEINE 50; 325; 40 MG/1; MG/1; MG/1
TABLET ORAL
Qty: 20 TABLET | Refills: 0 | Status: SHIPPED | OUTPATIENT
Start: 2018-10-05 | End: 2018-10-16 | Stop reason: SDUPTHER

## 2018-10-16 ENCOUNTER — TELEPHONE (OUTPATIENT)
Dept: INTERNAL MEDICINE CLINIC | Age: 63
End: 2018-10-16

## 2018-10-16 RX ORDER — BUTALBITAL, ACETAMINOPHEN AND CAFFEINE 50; 325; 40 MG/1; MG/1; MG/1
TABLET ORAL
Qty: 20 TABLET | Refills: 0 | OUTPATIENT
Start: 2018-10-16 | End: 2018-10-24 | Stop reason: SDUPTHER

## 2018-10-16 RX ORDER — DICYCLOMINE HCL 20 MG
TABLET ORAL
Qty: 60 TABLET | Refills: 0 | OUTPATIENT
Start: 2018-10-16 | End: 2018-10-29 | Stop reason: SDUPTHER

## 2018-10-18 ENCOUNTER — PATIENT MESSAGE (OUTPATIENT)
Dept: INTERNAL MEDICINE CLINIC | Age: 63
End: 2018-10-18

## 2018-10-18 ENCOUNTER — TELEPHONE (OUTPATIENT)
Dept: INTERNAL MEDICINE CLINIC | Age: 63
End: 2018-10-18

## 2018-10-18 NOTE — TELEPHONE ENCOUNTER
OK to take Savella - may help w/ some pain syndromes as well -- would recommend getting off Celexa if she is on the [facility]

## 2018-10-18 NOTE — TELEPHONE ENCOUNTER
Prescription Question     From  Rosie Hood To  [facility] Practice Support Sent  10/18/2018  9:20 AM   Hi, will see you on October 30, but, wanted to let you know. Dr Mel Giron has put me on 3 Tramadol a day.  Went yesterday, wants to put me on Savella. Is it OK to take? Should I stop Celexa? What do you think?  Thanks, Kain Newby

## 2018-10-24 ENCOUNTER — TELEPHONE (OUTPATIENT)
Dept: INTERNAL MEDICINE CLINIC | Age: 63
End: 2018-10-24

## 2018-10-24 RX ORDER — BUTALBITAL, ACETAMINOPHEN AND CAFFEINE 50; 325; 40 MG/1; MG/1; MG/1
TABLET ORAL
Qty: 20 TABLET | Refills: 0 | Status: SHIPPED | OUTPATIENT
Start: 2018-10-24 | End: 2018-11-26 | Stop reason: SDUPTHER

## 2018-10-24 NOTE — TELEPHONE ENCOUNTER
Medication e-scribed to the pharmacy. Patient states she has 2 of the medication at night to help her sleep. Patient advised. appt 10/30/18.

## 2018-10-29 DIAGNOSIS — M46.1 BILATERAL SACROILIITIS (HCC): ICD-10-CM

## 2018-10-29 DIAGNOSIS — M47.27 LUMBOSACRAL SPONDYLOSIS WITH RADICULOPATHY: ICD-10-CM

## 2018-10-29 RX ORDER — DICYCLOMINE HCL 20 MG
TABLET ORAL
Qty: 60 TABLET | Refills: 0 | Status: SHIPPED | OUTPATIENT
Start: 2018-10-29 | End: 2018-11-12 | Stop reason: SDUPTHER

## 2018-10-29 RX ORDER — MELOXICAM 15 MG/1
15 TABLET ORAL DAILY
Qty: 30 TABLET | Refills: 0 | Status: SHIPPED | OUTPATIENT
Start: 2018-10-29 | End: 2018-11-26 | Stop reason: SDUPTHER

## 2018-10-30 ENCOUNTER — OFFICE VISIT (OUTPATIENT)
Dept: INTERNAL MEDICINE CLINIC | Age: 63
End: 2018-10-30
Payer: MEDICARE

## 2018-10-30 VITALS
HEART RATE: 66 BPM | SYSTOLIC BLOOD PRESSURE: 126 MMHG | WEIGHT: 178 LBS | DIASTOLIC BLOOD PRESSURE: 64 MMHG | BODY MASS INDEX: 26.67 KG/M2

## 2018-10-30 DIAGNOSIS — Z12.31 ENCOUNTER FOR SCREENING MAMMOGRAM FOR BREAST CANCER: Primary | ICD-10-CM

## 2018-10-30 DIAGNOSIS — F41.1 GAD (GENERALIZED ANXIETY DISORDER): ICD-10-CM

## 2018-10-30 DIAGNOSIS — R00.0 SINUS TACHYCARDIA: ICD-10-CM

## 2018-10-30 DIAGNOSIS — R53.83 FATIGUE, UNSPECIFIED TYPE: ICD-10-CM

## 2018-10-30 DIAGNOSIS — I10 ESSENTIAL HYPERTENSION: ICD-10-CM

## 2018-10-30 PROBLEM — K44.9 HIATAL HERNIA: Status: RESOLVED | Noted: 2018-07-30 | Resolved: 2018-10-30

## 2018-10-30 PROCEDURE — 99214 OFFICE O/P EST MOD 30 MIN: CPT | Performed by: INTERNAL MEDICINE

## 2018-10-30 ASSESSMENT — ENCOUNTER SYMPTOMS
TROUBLE SWALLOWING: 0
SHORTNESS OF BREATH: 0
NAUSEA: 0
BACK PAIN: 1

## 2018-10-30 NOTE — ASSESSMENT & PLAN NOTE
Unchanged. Medication may be playing a role. She is still on estrogen replacement. She no longer has testosterone replacement. She felt better when on testosterone including her libido.   She will discuss with her gynecologist.

## 2018-11-12 ENCOUNTER — TELEPHONE (OUTPATIENT)
Dept: INTERNAL MEDICINE CLINIC | Age: 63
End: 2018-11-12

## 2018-11-12 RX ORDER — DICYCLOMINE HCL 20 MG
TABLET ORAL
Qty: 60 TABLET | Refills: 0 | Status: SHIPPED | OUTPATIENT
Start: 2018-11-12 | End: 2018-11-26 | Stop reason: SDUPTHER

## 2018-11-20 ENCOUNTER — HOSPITAL ENCOUNTER (OUTPATIENT)
Age: 63
Setting detail: OUTPATIENT SURGERY
Discharge: HOME | End: 2018-11-20
Attending: ANESTHESIOLOGY | Admitting: ANESTHESIOLOGY
Payer: MEDICARE

## 2018-11-20 ENCOUNTER — APPOINTMENT (OUTPATIENT)
Dept: GENERAL RADIOLOGY | Age: 63
End: 2018-11-20
Attending: ANESTHESIOLOGY
Payer: MEDICARE

## 2018-11-20 VITALS
OXYGEN SATURATION: 98 % | RESPIRATION RATE: 16 BRPM | BODY MASS INDEX: 25.77 KG/M2 | SYSTOLIC BLOOD PRESSURE: 139 MMHG | DIASTOLIC BLOOD PRESSURE: 76 MMHG | HEIGHT: 69 IN | WEIGHT: 174 LBS | HEART RATE: 93 BPM

## 2018-11-20 PROCEDURE — 2500000003 HC RX 250 WO HCPCS: Performed by: ANESTHESIOLOGY

## 2018-11-20 PROCEDURE — 2709999900 HC NON-CHARGEABLE SUPPLY: Performed by: ANESTHESIOLOGY

## 2018-11-20 PROCEDURE — 77003 FLUOROGUIDE FOR SPINE INJECT: CPT

## 2018-11-20 PROCEDURE — 6360000002 HC RX W HCPCS: Performed by: ANESTHESIOLOGY

## 2018-11-20 PROCEDURE — 3610000054 HC PAIN LEVEL 3 BASE (NON-OR): Performed by: ANESTHESIOLOGY

## 2018-11-20 PROCEDURE — 99152 MOD SED SAME PHYS/QHP 5/>YRS: CPT | Performed by: ANESTHESIOLOGY

## 2018-11-20 RX ORDER — LIDOCAINE HYDROCHLORIDE 10 MG/ML
INJECTION, SOLUTION EPIDURAL; INFILTRATION; INTRACAUDAL; PERINEURAL
Status: COMPLETED | OUTPATIENT
Start: 2018-11-20 | End: 2018-11-20

## 2018-11-20 RX ORDER — FENTANYL CITRATE 50 UG/ML
INJECTION, SOLUTION INTRAMUSCULAR; INTRAVENOUS
Status: COMPLETED | OUTPATIENT
Start: 2018-11-20 | End: 2018-11-20

## 2018-11-20 RX ORDER — MIDAZOLAM HYDROCHLORIDE 1 MG/ML
INJECTION INTRAMUSCULAR; INTRAVENOUS
Status: COMPLETED | OUTPATIENT
Start: 2018-11-20 | End: 2018-11-20

## 2018-11-20 RX ORDER — METHYLPREDNISOLONE ACETATE 80 MG/ML
INJECTION, SUSPENSION INTRA-ARTICULAR; INTRALESIONAL; INTRAMUSCULAR; SOFT TISSUE
Status: COMPLETED | OUTPATIENT
Start: 2018-11-20 | End: 2018-11-20

## 2018-11-20 ASSESSMENT — PAIN DESCRIPTION - PAIN TYPE
TYPE: CHRONIC PAIN
TYPE: CHRONIC PAIN

## 2018-11-20 ASSESSMENT — PAIN DESCRIPTION - ORIENTATION
ORIENTATION: LOWER
ORIENTATION: LOWER

## 2018-11-20 ASSESSMENT — PAIN DESCRIPTION - LOCATION
LOCATION: BACK
LOCATION: BACK

## 2018-11-20 ASSESSMENT — PAIN - FUNCTIONAL ASSESSMENT: PAIN_FUNCTIONAL_ASSESSMENT: 0-10

## 2018-11-20 ASSESSMENT — PAIN SCALES - GENERAL
PAINLEVEL_OUTOF10: 4
PAINLEVEL_OUTOF10: 4

## 2018-11-20 ASSESSMENT — PAIN DESCRIPTION - DESCRIPTORS: DESCRIPTORS: CONSTANT;ACHING

## 2018-11-20 NOTE — PROGRESS NOTES
IV discontinued, catheter intact, and dressing applied. Procedural dressing dry and intact. Bilateral  extremities equal in strength. Discharge instructions reviewed with patient or responsible adult, signed and copy given. All home medications have been reviewed. All questions answered and patient or responsible adult verbalized understanding.   PAIN LEVEL AT DISCHARGE _4____

## 2018-11-21 ENCOUNTER — TELEPHONE (OUTPATIENT)
Dept: INTERNAL MEDICINE CLINIC | Age: 63
End: 2018-11-21

## 2018-11-21 NOTE — TELEPHONE ENCOUNTER
Lidocanine 5% isn't covered with insurance but Diclosenc 1% gel is  And wanted to know if pcp approved this. .. Form regarding this matter was faxed twice already she stated. . pls advise

## 2018-11-26 ENCOUNTER — TELEPHONE (OUTPATIENT)
Dept: INTERNAL MEDICINE CLINIC | Age: 63
End: 2018-11-26

## 2018-11-26 DIAGNOSIS — M47.27 LUMBOSACRAL SPONDYLOSIS WITH RADICULOPATHY: ICD-10-CM

## 2018-11-26 DIAGNOSIS — M46.1 BILATERAL SACROILIITIS (HCC): ICD-10-CM

## 2018-11-26 RX ORDER — MELOXICAM 15 MG/1
15 TABLET ORAL DAILY
Qty: 30 TABLET | Refills: 0 | Status: SHIPPED | OUTPATIENT
Start: 2018-11-26 | End: 2018-12-26 | Stop reason: SDUPTHER

## 2018-11-26 RX ORDER — BUTALBITAL, ACETAMINOPHEN AND CAFFEINE 50; 325; 40 MG/1; MG/1; MG/1
TABLET ORAL
Qty: 20 TABLET | Refills: 0 | Status: CANCELLED | OUTPATIENT
Start: 2018-11-26

## 2018-11-26 RX ORDER — DICYCLOMINE HCL 20 MG
TABLET ORAL
Qty: 60 TABLET | Refills: 0 | Status: SHIPPED | OUTPATIENT
Start: 2018-11-26 | End: 2018-12-18 | Stop reason: SDUPTHER

## 2018-11-27 RX ORDER — BUTALBITAL, ACETAMINOPHEN AND CAFFEINE 50; 325; 40 MG/1; MG/1; MG/1
TABLET ORAL
Qty: 20 TABLET | Refills: 0 | Status: SHIPPED | OUTPATIENT
Start: 2018-11-27 | End: 2019-04-02 | Stop reason: SDUPTHER

## 2018-12-05 ENCOUNTER — TELEPHONE (OUTPATIENT)
Dept: INTERNAL MEDICINE CLINIC | Age: 63
End: 2018-12-05

## 2018-12-13 ENCOUNTER — TELEPHONE (OUTPATIENT)
Dept: INTERNAL MEDICINE CLINIC | Age: 63
End: 2018-12-13

## 2018-12-13 NOTE — TELEPHONE ENCOUNTER
RE: Non-Urgent Medical Question     From  Abrahan Lazarr To  [facility] 111 Practice Support Sent  12/13/2018  4:49 PM   Have had lots of dark yellow discharge, face and head hurt, eyes hurt and puffy. Pretty much same as my usual yearly sinus infection junk.  Neck hurts too. About 4 days.

## 2018-12-14 RX ORDER — AMOXICILLIN AND CLAVULANATE POTASSIUM 875; 125 MG/1; MG/1
1 TABLET, FILM COATED ORAL 2 TIMES DAILY
Qty: 20 TABLET | Refills: 0 | Status: SHIPPED | OUTPATIENT
Start: 2018-12-14 | End: 2018-12-24

## 2018-12-14 RX ORDER — METHYLPREDNISOLONE 4 MG/1
TABLET ORAL
Qty: 1 KIT | Refills: 0 | Status: SHIPPED | OUTPATIENT
Start: 2018-12-14 | End: 2018-12-20

## 2018-12-18 RX ORDER — DICYCLOMINE HCL 20 MG
TABLET ORAL
Qty: 60 TABLET | Refills: 2 | Status: SHIPPED | OUTPATIENT
Start: 2018-12-18 | End: 2019-02-04 | Stop reason: SDUPTHER

## 2018-12-19 ENCOUNTER — TELEPHONE (OUTPATIENT)
Dept: INTERNAL MEDICINE CLINIC | Age: 63
End: 2018-12-19

## 2018-12-19 DIAGNOSIS — F41.1 GAD (GENERALIZED ANXIETY DISORDER): ICD-10-CM

## 2018-12-20 RX ORDER — LORAZEPAM 1 MG/1
TABLET ORAL
Qty: 150 TABLET | Refills: 0 | OUTPATIENT
Start: 2018-12-20 | End: 2019-01-10 | Stop reason: SDUPTHER

## 2018-12-26 DIAGNOSIS — M47.27 LUMBOSACRAL SPONDYLOSIS WITH RADICULOPATHY: ICD-10-CM

## 2018-12-26 DIAGNOSIS — M46.1 BILATERAL SACROILIITIS (HCC): ICD-10-CM

## 2018-12-27 RX ORDER — MELOXICAM 15 MG/1
15 TABLET ORAL DAILY
Qty: 30 TABLET | Refills: 0 | Status: SHIPPED | OUTPATIENT
Start: 2018-12-27 | End: 2019-01-25 | Stop reason: SDUPTHER

## 2019-01-07 ENCOUNTER — TELEPHONE (OUTPATIENT)
Dept: INTERNAL MEDICINE CLINIC | Age: 64
End: 2019-01-07

## 2019-01-07 RX ORDER — ACYCLOVIR 50 MG/G
OINTMENT TOPICAL
Qty: 15 G | Refills: 1 | Status: SHIPPED | OUTPATIENT
Start: 2019-01-07 | End: 2019-01-14

## 2019-01-10 ENCOUNTER — OFFICE VISIT (OUTPATIENT)
Dept: INTERNAL MEDICINE CLINIC | Age: 64
End: 2019-01-10
Payer: MEDICARE

## 2019-01-10 VITALS
BODY MASS INDEX: 27.55 KG/M2 | HEIGHT: 69 IN | DIASTOLIC BLOOD PRESSURE: 82 MMHG | WEIGHT: 186 LBS | OXYGEN SATURATION: 98 % | HEART RATE: 101 BPM | SYSTOLIC BLOOD PRESSURE: 130 MMHG

## 2019-01-10 DIAGNOSIS — F32.A DEPRESSIVE DISORDER: ICD-10-CM

## 2019-01-10 DIAGNOSIS — E55.9 VITAMIN D DEFICIENCY: Primary | ICD-10-CM

## 2019-01-10 DIAGNOSIS — F41.1 GAD (GENERALIZED ANXIETY DISORDER): ICD-10-CM

## 2019-01-10 DIAGNOSIS — I10 ESSENTIAL HYPERTENSION: ICD-10-CM

## 2019-01-10 DIAGNOSIS — E55.9 VITAMIN D DEFICIENCY: ICD-10-CM

## 2019-01-10 DIAGNOSIS — E78.5 HYPERLIPIDEMIA, UNSPECIFIED HYPERLIPIDEMIA TYPE: ICD-10-CM

## 2019-01-10 LAB
A/G RATIO: 1.7 (ref 1.1–2.2)
ALBUMIN SERPL-MCNC: 4.5 G/DL (ref 3.4–5)
ALP BLD-CCNC: 131 U/L (ref 40–129)
ALT SERPL-CCNC: 21 U/L (ref 10–40)
ANION GAP SERPL CALCULATED.3IONS-SCNC: 15 MMOL/L (ref 3–16)
AST SERPL-CCNC: 18 U/L (ref 15–37)
BASOPHILS ABSOLUTE: 0.1 K/UL (ref 0–0.2)
BASOPHILS RELATIVE PERCENT: 1.8 %
BILIRUB SERPL-MCNC: <0.2 MG/DL (ref 0–1)
BUN BLDV-MCNC: 11 MG/DL (ref 7–20)
CALCIUM SERPL-MCNC: 9 MG/DL (ref 8.3–10.6)
CHLORIDE BLD-SCNC: 97 MMOL/L (ref 99–110)
CHOLESTEROL, TOTAL: 272 MG/DL (ref 0–199)
CO2: 25 MMOL/L (ref 21–32)
CREAT SERPL-MCNC: 0.7 MG/DL (ref 0.6–1.2)
EOSINOPHILS ABSOLUTE: 0.3 K/UL (ref 0–0.6)
EOSINOPHILS RELATIVE PERCENT: 3.7 %
GFR AFRICAN AMERICAN: >60
GFR NON-AFRICAN AMERICAN: >60
GLOBULIN: 2.6 G/DL
GLUCOSE BLD-MCNC: 101 MG/DL (ref 70–99)
HCT VFR BLD CALC: 43.8 % (ref 36–48)
HDLC SERPL-MCNC: 72 MG/DL (ref 40–60)
HEMOGLOBIN: 14.9 G/DL (ref 12–16)
LDL CHOLESTEROL CALCULATED: 162 MG/DL
LYMPHOCYTES ABSOLUTE: 3.3 K/UL (ref 1–5.1)
LYMPHOCYTES RELATIVE PERCENT: 45.1 %
MCH RBC QN AUTO: 33.5 PG (ref 26–34)
MCHC RBC AUTO-ENTMCNC: 34 G/DL (ref 31–36)
MCV RBC AUTO: 98.5 FL (ref 80–100)
MONOCYTES ABSOLUTE: 0.6 K/UL (ref 0–1.3)
MONOCYTES RELATIVE PERCENT: 8.2 %
NEUTROPHILS ABSOLUTE: 3.1 K/UL (ref 1.7–7.7)
NEUTROPHILS RELATIVE PERCENT: 41.2 %
PDW BLD-RTO: 13.1 % (ref 12.4–15.4)
PLATELET # BLD: 257 K/UL (ref 135–450)
PMV BLD AUTO: 8.6 FL (ref 5–10.5)
POTASSIUM SERPL-SCNC: 4.2 MMOL/L (ref 3.5–5.1)
RBC # BLD: 4.45 M/UL (ref 4–5.2)
SODIUM BLD-SCNC: 137 MMOL/L (ref 136–145)
TOTAL PROTEIN: 7.1 G/DL (ref 6.4–8.2)
TRIGL SERPL-MCNC: 189 MG/DL (ref 0–150)
TSH REFLEX FT4: 1.93 UIU/ML (ref 0.27–4.2)
VLDLC SERPL CALC-MCNC: 38 MG/DL
WBC # BLD: 7.4 K/UL (ref 4–11)

## 2019-01-10 PROCEDURE — 3017F COLORECTAL CA SCREEN DOC REV: CPT | Performed by: INTERNAL MEDICINE

## 2019-01-10 PROCEDURE — G8484 FLU IMMUNIZE NO ADMIN: HCPCS | Performed by: INTERNAL MEDICINE

## 2019-01-10 PROCEDURE — G8419 CALC BMI OUT NRM PARAM NOF/U: HCPCS | Performed by: INTERNAL MEDICINE

## 2019-01-10 PROCEDURE — G8510 SCR DEP NEG, NO PLAN REQD: HCPCS | Performed by: INTERNAL MEDICINE

## 2019-01-10 PROCEDURE — 4004F PT TOBACCO SCREEN RCVD TLK: CPT | Performed by: INTERNAL MEDICINE

## 2019-01-10 PROCEDURE — G8427 DOCREV CUR MEDS BY ELIG CLIN: HCPCS | Performed by: INTERNAL MEDICINE

## 2019-01-10 PROCEDURE — 99213 OFFICE O/P EST LOW 20 MIN: CPT | Performed by: INTERNAL MEDICINE

## 2019-01-10 RX ORDER — LORAZEPAM 1 MG/1
TABLET ORAL
Qty: 150 TABLET | Refills: 2 | Status: SHIPPED | OUTPATIENT
Start: 2019-01-10 | End: 2019-04-10 | Stop reason: SDUPTHER

## 2019-01-10 RX ORDER — ESCITALOPRAM OXALATE 10 MG/1
10 TABLET ORAL DAILY
Qty: 30 TABLET | Refills: 5 | Status: SHIPPED | OUTPATIENT
Start: 2019-01-10 | End: 2019-04-10

## 2019-01-10 ASSESSMENT — PATIENT HEALTH QUESTIONNAIRE - PHQ9
SUM OF ALL RESPONSES TO PHQ9 QUESTIONS 1 & 2: 2
2. FEELING DOWN, DEPRESSED OR HOPELESS: 1
1. LITTLE INTEREST OR PLEASURE IN DOING THINGS: 1
SUM OF ALL RESPONSES TO PHQ QUESTIONS 1-9: 2
SUM OF ALL RESPONSES TO PHQ QUESTIONS 1-9: 2

## 2019-01-10 ASSESSMENT — ENCOUNTER SYMPTOMS: RESPIRATORY NEGATIVE: 1

## 2019-01-11 LAB — VITAMIN D 25-HYDROXY: 70.2 NG/ML

## 2019-01-25 DIAGNOSIS — M46.1 BILATERAL SACROILIITIS (HCC): ICD-10-CM

## 2019-01-25 DIAGNOSIS — M47.27 LUMBOSACRAL SPONDYLOSIS WITH RADICULOPATHY: ICD-10-CM

## 2019-01-25 RX ORDER — MELOXICAM 15 MG/1
15 TABLET ORAL DAILY
Qty: 30 TABLET | Refills: 0 | Status: SHIPPED | OUTPATIENT
Start: 2019-01-25 | End: 2019-02-25 | Stop reason: SDUPTHER

## 2019-02-04 DIAGNOSIS — M47.816 FACET ARTHROPATHY, LUMBAR: ICD-10-CM

## 2019-02-05 ENCOUNTER — TELEPHONE (OUTPATIENT)
Dept: INTERNAL MEDICINE CLINIC | Age: 64
End: 2019-02-05

## 2019-02-05 ENCOUNTER — PATIENT MESSAGE (OUTPATIENT)
Dept: INTERNAL MEDICINE CLINIC | Age: 64
End: 2019-02-05

## 2019-02-05 RX ORDER — DICYCLOMINE HCL 20 MG
TABLET ORAL
Qty: 60 TABLET | Refills: 0 | Status: SHIPPED | OUTPATIENT
Start: 2019-02-05 | End: 2019-02-18 | Stop reason: SDUPTHER

## 2019-02-05 RX ORDER — CITALOPRAM 20 MG/1
TABLET ORAL
Qty: 90 TABLET | Refills: 0 | Status: SHIPPED | OUTPATIENT
Start: 2019-02-05 | End: 2019-04-10

## 2019-02-05 RX ORDER — TIZANIDINE 4 MG/1
TABLET ORAL
Qty: 90 TABLET | Refills: 0 | Status: SHIPPED | OUTPATIENT
Start: 2019-02-05 | End: 2019-05-06 | Stop reason: SDUPTHER

## 2019-02-07 ENCOUNTER — TELEPHONE (OUTPATIENT)
Dept: INTERNAL MEDICINE CLINIC | Age: 64
End: 2019-02-07

## 2019-02-08 ENCOUNTER — TELEPHONE (OUTPATIENT)
Dept: FAMILY MEDICINE CLINIC | Age: 64
End: 2019-02-08

## 2019-02-15 RX ORDER — FLUCONAZOLE 150 MG/1
TABLET ORAL
Qty: 1 TABLET | Refills: 0 | Status: SHIPPED | OUTPATIENT
Start: 2019-02-15 | End: 2019-04-02 | Stop reason: SDUPTHER

## 2019-02-15 RX ORDER — ALBUTEROL SULFATE 90 UG/1
2 AEROSOL, METERED RESPIRATORY (INHALATION) EVERY 6 HOURS PRN
Qty: 54 G | Refills: 0 | Status: SHIPPED | OUTPATIENT
Start: 2019-02-15 | End: 2019-11-01

## 2019-02-15 RX ORDER — ALBUTEROL SULFATE 90 UG/1
2 AEROSOL, METERED RESPIRATORY (INHALATION) EVERY 6 HOURS PRN
Qty: 18 G | Refills: 0 | Status: SHIPPED | OUTPATIENT
Start: 2019-02-15 | End: 2019-02-15 | Stop reason: SDUPTHER

## 2019-02-18 RX ORDER — DICYCLOMINE HCL 20 MG
TABLET ORAL
Qty: 60 TABLET | Refills: 0 | Status: SHIPPED | OUTPATIENT
Start: 2019-02-18 | End: 2019-03-04 | Stop reason: SDUPTHER

## 2019-02-18 RX ORDER — TRIAMTERENE AND HYDROCHLOROTHIAZIDE 37.5; 25 MG/1; MG/1
TABLET ORAL
Qty: 90 TABLET | Refills: 0 | Status: SHIPPED | OUTPATIENT
Start: 2019-02-18 | End: 2019-05-15 | Stop reason: SDUPTHER

## 2019-02-25 DIAGNOSIS — M47.27 LUMBOSACRAL SPONDYLOSIS WITH RADICULOPATHY: ICD-10-CM

## 2019-02-25 DIAGNOSIS — M46.1 BILATERAL SACROILIITIS (HCC): ICD-10-CM

## 2019-02-25 RX ORDER — MELOXICAM 15 MG/1
15 TABLET ORAL DAILY
Qty: 30 TABLET | Refills: 0 | Status: SHIPPED | OUTPATIENT
Start: 2019-02-25 | End: 2019-04-02 | Stop reason: SDUPTHER

## 2019-03-05 RX ORDER — DICYCLOMINE HCL 20 MG
TABLET ORAL
Qty: 60 TABLET | Refills: 1 | Status: SHIPPED | OUTPATIENT
Start: 2019-03-05 | End: 2019-03-30 | Stop reason: SDUPTHER

## 2019-03-13 RX ORDER — OMEPRAZOLE 40 MG/1
CAPSULE, DELAYED RELEASE ORAL
Qty: 180 CAPSULE | Refills: 0 | Status: SHIPPED | OUTPATIENT
Start: 2019-03-13 | End: 2019-06-06 | Stop reason: SDUPTHER

## 2019-04-01 RX ORDER — DICYCLOMINE HCL 20 MG
TABLET ORAL
Qty: 60 TABLET | Refills: 0 | Status: SHIPPED | OUTPATIENT
Start: 2019-04-01 | End: 2019-04-16 | Stop reason: SDUPTHER

## 2019-04-02 ENCOUNTER — PATIENT MESSAGE (OUTPATIENT)
Dept: INTERNAL MEDICINE CLINIC | Age: 64
End: 2019-04-02

## 2019-04-02 ENCOUNTER — TELEPHONE (OUTPATIENT)
Dept: INTERNAL MEDICINE CLINIC | Age: 64
End: 2019-04-02

## 2019-04-02 DIAGNOSIS — M47.27 LUMBOSACRAL SPONDYLOSIS WITH RADICULOPATHY: ICD-10-CM

## 2019-04-02 DIAGNOSIS — M46.1 BILATERAL SACROILIITIS (HCC): ICD-10-CM

## 2019-04-02 RX ORDER — MELOXICAM 15 MG/1
15 TABLET ORAL DAILY
Qty: 30 TABLET | Refills: 2 | Status: SHIPPED | OUTPATIENT
Start: 2019-04-02 | End: 2019-04-02 | Stop reason: SDUPTHER

## 2019-04-02 RX ORDER — FLUCONAZOLE 150 MG/1
TABLET ORAL
Qty: 1 TABLET | Refills: 0 | Status: SHIPPED | OUTPATIENT
Start: 2019-04-02 | End: 2019-04-10

## 2019-04-02 RX ORDER — BUTALBITAL, ACETAMINOPHEN AND CAFFEINE 50; 325; 40 MG/1; MG/1; MG/1
TABLET ORAL
Qty: 20 TABLET | Refills: 0 | Status: SHIPPED | OUTPATIENT
Start: 2019-04-02 | End: 2019-04-16 | Stop reason: SDUPTHER

## 2019-04-02 RX ORDER — MELOXICAM 15 MG/1
15 TABLET ORAL DAILY
Qty: 90 TABLET | Refills: 2 | Status: SHIPPED | OUTPATIENT
Start: 2019-04-02 | End: 2019-06-17 | Stop reason: SDUPTHER

## 2019-04-02 NOTE — TELEPHONE ENCOUNTER
Prescription Question     From  Angel Olivares To  [facility] 111 Practice Support Sent  4/2/2019 12:03 PM   ----- Message from [address] [place] [address] [address], Processor sent at 4/2/2019 12:03 PM EDT -----     Hi, I need a refill for [id number]. Dr Sarah Domínguez ordered it but I don't see him anymore. Can you refill it, please?  Thanks, Kim

## 2019-04-10 ENCOUNTER — OFFICE VISIT (OUTPATIENT)
Dept: INTERNAL MEDICINE CLINIC | Age: 64
End: 2019-04-10
Payer: MEDICARE

## 2019-04-10 VITALS
BODY MASS INDEX: 27.4 KG/M2 | HEIGHT: 69 IN | HEART RATE: 82 BPM | OXYGEN SATURATION: 97 % | DIASTOLIC BLOOD PRESSURE: 80 MMHG | SYSTOLIC BLOOD PRESSURE: 128 MMHG | WEIGHT: 185 LBS

## 2019-04-10 DIAGNOSIS — I10 ESSENTIAL HYPERTENSION: ICD-10-CM

## 2019-04-10 DIAGNOSIS — R21 RASH AND NONSPECIFIC SKIN ERUPTION: ICD-10-CM

## 2019-04-10 DIAGNOSIS — K21.9 GASTROESOPHAGEAL REFLUX DISEASE WITHOUT ESOPHAGITIS: ICD-10-CM

## 2019-04-10 DIAGNOSIS — Z79.899 MEDICAL MARIJUANA USE: ICD-10-CM

## 2019-04-10 DIAGNOSIS — F33.41 RECURRENT MAJOR DEPRESSIVE DISORDER, IN PARTIAL REMISSION (HCC): ICD-10-CM

## 2019-04-10 DIAGNOSIS — F32.A DEPRESSIVE DISORDER: ICD-10-CM

## 2019-04-10 DIAGNOSIS — F41.1 GAD (GENERALIZED ANXIETY DISORDER): ICD-10-CM

## 2019-04-10 DIAGNOSIS — M79.7 FIBROMYALGIA: ICD-10-CM

## 2019-04-10 DIAGNOSIS — R00.0 SINUS TACHYCARDIA: ICD-10-CM

## 2019-04-10 DIAGNOSIS — J32.8 OTHER CHRONIC SINUSITIS: ICD-10-CM

## 2019-04-10 PROBLEM — F11.90 CHRONIC, CONTINUOUS USE OF OPIOIDS: Status: RESOLVED | Noted: 2017-07-03 | Resolved: 2019-04-10

## 2019-04-10 PROCEDURE — 99214 OFFICE O/P EST MOD 30 MIN: CPT | Performed by: INTERNAL MEDICINE

## 2019-04-10 PROCEDURE — G8427 DOCREV CUR MEDS BY ELIG CLIN: HCPCS | Performed by: INTERNAL MEDICINE

## 2019-04-10 PROCEDURE — 3017F COLORECTAL CA SCREEN DOC REV: CPT | Performed by: INTERNAL MEDICINE

## 2019-04-10 PROCEDURE — 4004F PT TOBACCO SCREEN RCVD TLK: CPT | Performed by: INTERNAL MEDICINE

## 2019-04-10 PROCEDURE — G8419 CALC BMI OUT NRM PARAM NOF/U: HCPCS | Performed by: INTERNAL MEDICINE

## 2019-04-10 RX ORDER — BETAMETHASONE DIPROPIONATE 0.5 MG/G
CREAM TOPICAL 2 TIMES DAILY
Qty: 15 G | Refills: 0 | Status: SHIPPED | OUTPATIENT
Start: 2019-04-10 | End: 2019-11-01

## 2019-04-10 RX ORDER — SULFAMETHOXAZOLE AND TRIMETHOPRIM 800; 160 MG/1; MG/1
1 TABLET ORAL 2 TIMES DAILY
Qty: 20 TABLET | Refills: 0 | Status: SHIPPED | OUTPATIENT
Start: 2019-04-10 | End: 2019-04-20

## 2019-04-10 RX ORDER — LORAZEPAM 1 MG/1
TABLET ORAL
Qty: 150 TABLET | Refills: 2 | Status: SHIPPED | OUTPATIENT
Start: 2019-04-10 | End: 2019-06-14 | Stop reason: SDUPTHER

## 2019-04-10 ASSESSMENT — ENCOUNTER SYMPTOMS
GASTROINTESTINAL NEGATIVE: 1
RESPIRATORY NEGATIVE: 1
SINUS PRESSURE: 1

## 2019-04-10 ASSESSMENT — PATIENT HEALTH QUESTIONNAIRE - PHQ9
SUM OF ALL RESPONSES TO PHQ9 QUESTIONS 1 & 2: 0
1. LITTLE INTEREST OR PLEASURE IN DOING THINGS: 0
SUM OF ALL RESPONSES TO PHQ QUESTIONS 1-9: 0
SUM OF ALL RESPONSES TO PHQ QUESTIONS 1-9: 0
2. FEELING DOWN, DEPRESSED OR HOPELESS: 0

## 2019-04-10 NOTE — PROGRESS NOTES
nerve deficit. Skin: Rash noted. She is not diaphoretic. No pallor. Psychiatric: She has a normal mood and affect. Her speech is normal and behavior is normal. Judgment and thought content normal. Her mood appears not anxious. Cognition and memory are normal. She does not exhibit a depressed mood. ASSESSMENT:       Encounter Diagnoses   Name Primary?  Recurrent major depressive disorder, in partial remission ([facility] [address]     KEVIN (generalized anxiety disorder)     Depressive disorder     Sinus tachycardia     Gastroesophageal reflux disease without esophagitis     Medical marijuana use     Essential hypertension     Fibromyalgia     Rash and nonspecific skin eruption     Other chronic sinusitis        KEVIN (generalized anxiety disorder)  Monitor report done. Refill Ativan. Also try viibryd    Depressive disorder  Not doing well with Celexa. She tried increasing dose. Try switching to viibryd - gave Rx for starter pack. She did not do well with Effexor in the past.    Sinus tachycardia  Controlled with atenolol    Gastroesophageal reflux disease without esophagitis  Continue PPI    Medical marijuana use  Patient no longer taking tramadol or opioids. She is using medical marijuana as she now has a license 4. Essential hypertension  BP okay    Fibromyalgia  Now has medical marijuana license    Rash and nonspecific skin eruption  Chest and back for couple weeks. No known exposure. Gave Rx for Diprolene cream.    Other chronic sinusitis  Partially treated with Augmentin which causes GI issues. Gave Rx for Bactrim. PLAN:See ASSESSMENT for evaluation & PLAN     No orders of the defined types were placed in this encounter. PSH, PMH, SH and FH reviewed and noted. Recent and past labs, tests and consultsalso reviewed. Recent or new meds also reviewed.

## 2019-04-10 NOTE — ASSESSMENT & PLAN NOTE
Patient no longer taking tramadol or opioids. She is using medical marijuana as she now has a license 4.

## 2019-04-10 NOTE — ASSESSMENT & PLAN NOTE
Not doing well with Celexa. She tried increasing dose. Try switching to viibryd - gave Rx for starter pack.   She did not do well with Effexor in the past.

## 2019-04-11 ENCOUNTER — TELEPHONE (OUTPATIENT)
Dept: INTERNAL MEDICINE CLINIC | Age: 64
End: 2019-04-11

## 2019-04-11 NOTE — TELEPHONE ENCOUNTER
PA SUBMITTED FOR Viibryd Starter Pack 10 & 20MG OR KIT  Key: [id number] - PA Case ID: [id number] - Rx #: [id number]  STATUS: PENDING

## 2019-04-15 NOTE — TELEPHONE ENCOUNTER
Received APPROVAL for Viibryd Starter Pack 10 & 20MG OR KIT until 04/11/2021. Please advise patient. Thank you.

## 2019-04-16 RX ORDER — BUTALBITAL, ACETAMINOPHEN AND CAFFEINE 50; 325; 40 MG/1; MG/1; MG/1
TABLET ORAL
Qty: 20 TABLET | Refills: 1 | Status: SHIPPED | OUTPATIENT
Start: 2019-04-16 | End: 2021-05-07 | Stop reason: ALTCHOICE

## 2019-04-16 RX ORDER — DICYCLOMINE HCL 20 MG
TABLET ORAL
Qty: 60 TABLET | Refills: 1 | Status: SHIPPED | OUTPATIENT
Start: 2019-04-16 | End: 2019-05-20 | Stop reason: SDUPTHER

## 2019-05-06 DIAGNOSIS — M47.816 FACET ARTHROPATHY, LUMBAR: ICD-10-CM

## 2019-05-06 RX ORDER — TIZANIDINE 4 MG/1
TABLET ORAL
Qty: 90 TABLET | Refills: 0 | Status: SHIPPED | OUTPATIENT
Start: 2019-05-06 | End: 2019-06-18 | Stop reason: SDUPTHER

## 2019-05-07 ENCOUNTER — PATIENT MESSAGE (OUTPATIENT)
Dept: INTERNAL MEDICINE CLINIC | Age: 64
End: 2019-05-07

## 2019-05-07 ENCOUNTER — TELEPHONE (OUTPATIENT)
Dept: INTERNAL MEDICINE CLINIC | Age: 64
End: 2019-05-07

## 2019-05-07 RX ORDER — VILAZODONE HYDROCHLORIDE 20 MG/1
20 TABLET ORAL DAILY
Qty: 30 TABLET | Refills: 5 | Status: SHIPPED | OUTPATIENT
Start: 2019-05-07 | End: 2019-08-01

## 2019-05-15 RX ORDER — ESTRADIOL 2 MG/1
2 TABLET ORAL DAILY
Qty: 90 TABLET | Refills: 0 | Status: SHIPPED | OUTPATIENT
Start: 2019-05-15 | End: 2019-06-19 | Stop reason: SDUPTHER

## 2019-05-15 RX ORDER — TRIAMTERENE AND HYDROCHLOROTHIAZIDE 37.5; 25 MG/1; MG/1
TABLET ORAL
Qty: 90 TABLET | Refills: 0 | Status: SHIPPED | OUTPATIENT
Start: 2019-05-15 | End: 2019-06-18 | Stop reason: SDUPTHER

## 2019-05-20 RX ORDER — DICYCLOMINE HCL 20 MG
TABLET ORAL
Qty: 60 TABLET | Refills: 0 | Status: SHIPPED | OUTPATIENT
Start: 2019-05-20 | End: 2019-06-06 | Stop reason: SDUPTHER

## 2019-06-06 RX ORDER — DICYCLOMINE HCL 20 MG
TABLET ORAL
Qty: 60 TABLET | Refills: 3 | Status: SHIPPED | OUTPATIENT
Start: 2019-06-06 | End: 2019-06-19 | Stop reason: SDUPTHER

## 2019-06-06 RX ORDER — FLUTICASONE PROPIONATE 50 MCG
SPRAY, SUSPENSION (ML) NASAL
Qty: 1 BOTTLE | Refills: 3 | Status: SHIPPED | OUTPATIENT
Start: 2019-06-06 | End: 2019-09-20 | Stop reason: SDUPTHER

## 2019-06-06 RX ORDER — OMEPRAZOLE 40 MG/1
CAPSULE, DELAYED RELEASE ORAL
Qty: 180 CAPSULE | Refills: 1 | Status: SHIPPED | OUTPATIENT
Start: 2019-06-06 | End: 2019-06-17 | Stop reason: SDUPTHER

## 2019-06-07 RX ORDER — FLUTICASONE PROPIONATE 50 MCG
SPRAY, SUSPENSION (ML) NASAL
Refills: 3 | OUTPATIENT
Start: 2019-06-07

## 2019-06-14 ENCOUNTER — TELEPHONE (OUTPATIENT)
Dept: INTERNAL MEDICINE CLINIC | Age: 64
End: 2019-06-14

## 2019-06-14 DIAGNOSIS — F41.1 GAD (GENERALIZED ANXIETY DISORDER): ICD-10-CM

## 2019-06-14 RX ORDER — LORAZEPAM 1 MG/1
TABLET ORAL
Qty: 150 TABLET | Refills: 2
Start: 2019-06-14 | End: 2019-09-20 | Stop reason: SDUPTHER

## 2019-06-17 DIAGNOSIS — M47.27 LUMBOSACRAL SPONDYLOSIS WITH RADICULOPATHY: ICD-10-CM

## 2019-06-17 DIAGNOSIS — M46.1 BILATERAL SACROILIITIS (HCC): ICD-10-CM

## 2019-06-17 RX ORDER — OMEPRAZOLE 40 MG/1
CAPSULE, DELAYED RELEASE ORAL
Qty: 180 CAPSULE | Refills: 3 | Status: SHIPPED | OUTPATIENT
Start: 2019-06-17 | End: 2020-08-31

## 2019-06-17 RX ORDER — MELOXICAM 15 MG/1
15 TABLET ORAL DAILY
Qty: 90 TABLET | Refills: 3 | Status: SHIPPED | OUTPATIENT
Start: 2019-06-17 | End: 2020-07-10 | Stop reason: ALTCHOICE

## 2019-06-17 RX ORDER — NIFEDIPINE 30 MG/1
30 TABLET, EXTENDED RELEASE ORAL DAILY
Qty: 90 TABLET | Refills: 3 | Status: SHIPPED | OUTPATIENT
Start: 2019-06-17 | End: 2019-08-01

## 2019-06-18 DIAGNOSIS — M47.816 FACET ARTHROPATHY, LUMBAR: ICD-10-CM

## 2019-06-18 RX ORDER — TIZANIDINE 4 MG/1
TABLET ORAL
Qty: 90 TABLET | Refills: 3 | Status: SHIPPED | OUTPATIENT
Start: 2019-06-18 | End: 2019-08-10

## 2019-06-18 RX ORDER — TRIAMTERENE AND HYDROCHLOROTHIAZIDE 37.5; 25 MG/1; MG/1
TABLET ORAL
Qty: 90 TABLET | Refills: 3 | Status: SHIPPED | OUTPATIENT
Start: 2019-06-18 | End: 2020-07-22

## 2019-06-19 RX ORDER — DICYCLOMINE HCL 20 MG
TABLET ORAL
Qty: 180 TABLET | Refills: 3 | Status: SHIPPED | OUTPATIENT
Start: 2019-06-19 | End: 2020-01-10

## 2019-06-19 RX ORDER — ESTRADIOL 2 MG/1
2 TABLET ORAL DAILY
Qty: 90 TABLET | Refills: 3 | Status: SHIPPED | OUTPATIENT
Start: 2019-06-19 | End: 2020-09-10

## 2019-08-01 ENCOUNTER — OFFICE VISIT (OUTPATIENT)
Dept: INTERNAL MEDICINE CLINIC | Age: 64
End: 2019-08-01
Payer: MEDICARE

## 2019-08-01 VITALS
DIASTOLIC BLOOD PRESSURE: 86 MMHG | BODY MASS INDEX: 27.4 KG/M2 | HEIGHT: 69 IN | HEART RATE: 110 BPM | SYSTOLIC BLOOD PRESSURE: 138 MMHG | WEIGHT: 185 LBS | OXYGEN SATURATION: 97 %

## 2019-08-01 DIAGNOSIS — Z79.899 MEDICAL MARIJUANA USE: ICD-10-CM

## 2019-08-01 DIAGNOSIS — I73.81 ERYTHROMELALGIA (HCC): ICD-10-CM

## 2019-08-01 DIAGNOSIS — G89.29 OTHER CHRONIC BACK PAIN: ICD-10-CM

## 2019-08-01 DIAGNOSIS — M54.9 OTHER CHRONIC BACK PAIN: ICD-10-CM

## 2019-08-01 DIAGNOSIS — R00.0 SINUS TACHYCARDIA: ICD-10-CM

## 2019-08-01 DIAGNOSIS — S32.010D CLOSED COMPRESSION FRACTURE OF L1 LUMBAR VERTEBRA WITH ROUTINE HEALING, SUBSEQUENT ENCOUNTER: ICD-10-CM

## 2019-08-01 DIAGNOSIS — F41.1 GAD (GENERALIZED ANXIETY DISORDER): ICD-10-CM

## 2019-08-01 DIAGNOSIS — F32.A DEPRESSIVE DISORDER: ICD-10-CM

## 2019-08-01 PROBLEM — R21 RASH AND NONSPECIFIC SKIN ERUPTION: Status: RESOLVED | Noted: 2018-03-13 | Resolved: 2019-08-01

## 2019-08-01 PROBLEM — S32.010A CLOSED COMPRESSION FRACTURE OF L1 VERTEBRA (HCC): Status: ACTIVE | Noted: 2019-08-01

## 2019-08-01 PROCEDURE — 3017F COLORECTAL CA SCREEN DOC REV: CPT | Performed by: INTERNAL MEDICINE

## 2019-08-01 PROCEDURE — G8427 DOCREV CUR MEDS BY ELIG CLIN: HCPCS | Performed by: INTERNAL MEDICINE

## 2019-08-01 PROCEDURE — G8419 CALC BMI OUT NRM PARAM NOF/U: HCPCS | Performed by: INTERNAL MEDICINE

## 2019-08-01 PROCEDURE — 99214 OFFICE O/P EST MOD 30 MIN: CPT | Performed by: INTERNAL MEDICINE

## 2019-08-01 PROCEDURE — 4004F PT TOBACCO SCREEN RCVD TLK: CPT | Performed by: INTERNAL MEDICINE

## 2019-08-01 RX ORDER — TRAMADOL HYDROCHLORIDE 50 MG/1
TABLET ORAL
Refills: 0 | COMMUNITY
Start: 2019-07-24 | End: 2019-11-01

## 2019-08-01 RX ORDER — DILTIAZEM HYDROCHLORIDE 240 MG/1
240 CAPSULE, EXTENDED RELEASE ORAL DAILY
Qty: 30 CAPSULE | Refills: 5 | Status: SHIPPED | OUTPATIENT
Start: 2019-08-01 | End: 2020-04-24

## 2019-08-01 ASSESSMENT — PATIENT HEALTH QUESTIONNAIRE - PHQ9
SUM OF ALL RESPONSES TO PHQ9 QUESTIONS 1 & 2: 0
1. LITTLE INTEREST OR PLEASURE IN DOING THINGS: 0
2. FEELING DOWN, DEPRESSED OR HOPELESS: 0
SUM OF ALL RESPONSES TO PHQ QUESTIONS 1-9: 0
SUM OF ALL RESPONSES TO PHQ QUESTIONS 1-9: 0

## 2019-08-01 ASSESSMENT — ENCOUNTER SYMPTOMS: BACK PAIN: 1

## 2019-08-01 NOTE — ASSESSMENT & PLAN NOTE
She does not feel the Viibryd is helping much. In fact she now feels like none of the antidepressants of done much for her except may be caused side effects. She is willing to stop the medication and see how she does untreated.

## 2019-08-01 NOTE — ASSESSMENT & PLAN NOTE
Patient has rare episodes where she feels her heart is beating very fast.  She will tend to get lightheaded during these episodes. They may not occur for several months. Will stop Procardia and change the diltiazem. If episodes continue may refer to cardiology for monitoring.

## 2019-08-02 RX ORDER — DILTIAZEM HYDROCHLORIDE 240 MG/1
CAPSULE, EXTENDED RELEASE ORAL
Qty: 90 CAPSULE | Refills: 5 | Status: SHIPPED | OUTPATIENT
Start: 2019-08-02 | End: 2019-09-20 | Stop reason: SDUPTHER

## 2019-08-06 ENCOUNTER — HOSPITAL ENCOUNTER (OUTPATIENT)
Dept: WOMENS IMAGING | Age: 64
Discharge: HOME | End: 2019-08-06
Payer: MEDICARE

## 2019-08-06 DIAGNOSIS — Z12.39 BREAST CANCER SCREENING: ICD-10-CM

## 2019-08-06 PROCEDURE — 77080 DXA BONE DENSITY AXIAL: CPT

## 2019-08-10 DIAGNOSIS — M47.816 FACET ARTHROPATHY, LUMBAR: ICD-10-CM

## 2019-08-10 RX ORDER — TIZANIDINE 4 MG/1
TABLET ORAL
Qty: 90 TABLET | Refills: 0 | Status: SHIPPED | OUTPATIENT
Start: 2019-08-10 | End: 2020-01-10

## 2019-08-26 ENCOUNTER — PATIENT MESSAGE (OUTPATIENT)
Dept: INTERNAL MEDICINE CLINIC | Age: 64
End: 2019-08-26

## 2019-08-26 DIAGNOSIS — F41.1 GAD (GENERALIZED ANXIETY DISORDER): ICD-10-CM

## 2019-08-26 RX ORDER — LORAZEPAM 1 MG/1
TABLET ORAL
Qty: 150 TABLET | Refills: 2 | Status: CANCELLED | OUTPATIENT
Start: 2019-08-26 | End: 2019-09-25

## 2019-08-28 RX ORDER — CITALOPRAM 20 MG/1
TABLET ORAL
Qty: 90 TABLET | Refills: 3 | Status: SHIPPED | OUTPATIENT
Start: 2019-08-28 | End: 2019-11-01

## 2019-09-03 RX ORDER — ALBUTEROL SULFATE 90 UG/1
2 AEROSOL, METERED RESPIRATORY (INHALATION) EVERY 6 HOURS PRN
Qty: 18 G | Refills: 0 | Status: SHIPPED | OUTPATIENT
Start: 2019-09-03 | End: 2020-03-17

## 2019-09-03 RX ORDER — FLUCONAZOLE 150 MG/1
TABLET ORAL
Qty: 1 TABLET | Refills: 0 | Status: SHIPPED | OUTPATIENT
Start: 2019-09-03 | End: 2019-11-01

## 2019-09-20 ENCOUNTER — TELEPHONE (OUTPATIENT)
Dept: INTERNAL MEDICINE CLINIC | Age: 64
End: 2019-09-20

## 2019-09-20 DIAGNOSIS — F41.1 GAD (GENERALIZED ANXIETY DISORDER): ICD-10-CM

## 2019-09-20 RX ORDER — LORAZEPAM 1 MG/1
TABLET ORAL
Qty: 150 TABLET | Refills: 1 | Status: SHIPPED | OUTPATIENT
Start: 2019-09-20 | End: 2019-11-01 | Stop reason: SDUPTHER

## 2019-09-20 RX ORDER — DILTIAZEM HYDROCHLORIDE 240 MG/1
CAPSULE, EXTENDED RELEASE ORAL
Qty: 90 CAPSULE | Refills: 3 | Status: SHIPPED | OUTPATIENT
Start: 2019-09-20 | End: 2020-09-10

## 2019-09-20 RX ORDER — FLUTICASONE PROPIONATE 50 MCG
SPRAY, SUSPENSION (ML) NASAL
Qty: 48 G | Refills: 2 | Status: SHIPPED | OUTPATIENT
Start: 2019-09-20 | End: 2020-07-22

## 2019-11-01 ENCOUNTER — OFFICE VISIT (OUTPATIENT)
Dept: INTERNAL MEDICINE CLINIC | Age: 64
End: 2019-11-01
Payer: MEDICARE

## 2019-11-01 VITALS
WEIGHT: 183 LBS | HEART RATE: 98 BPM | DIASTOLIC BLOOD PRESSURE: 80 MMHG | SYSTOLIC BLOOD PRESSURE: 138 MMHG | BODY MASS INDEX: 27.11 KG/M2 | HEIGHT: 69 IN | OXYGEN SATURATION: 98 %

## 2019-11-01 DIAGNOSIS — F41.1 GAD (GENERALIZED ANXIETY DISORDER): ICD-10-CM

## 2019-11-01 PROCEDURE — G8419 CALC BMI OUT NRM PARAM NOF/U: HCPCS | Performed by: INTERNAL MEDICINE

## 2019-11-01 PROCEDURE — 99213 OFFICE O/P EST LOW 20 MIN: CPT | Performed by: INTERNAL MEDICINE

## 2019-11-01 PROCEDURE — 3017F COLORECTAL CA SCREEN DOC REV: CPT | Performed by: INTERNAL MEDICINE

## 2019-11-01 PROCEDURE — 4004F PT TOBACCO SCREEN RCVD TLK: CPT | Performed by: INTERNAL MEDICINE

## 2019-11-01 PROCEDURE — G8484 FLU IMMUNIZE NO ADMIN: HCPCS | Performed by: INTERNAL MEDICINE

## 2019-11-01 PROCEDURE — G8427 DOCREV CUR MEDS BY ELIG CLIN: HCPCS | Performed by: INTERNAL MEDICINE

## 2019-11-01 RX ORDER — LORAZEPAM 1 MG/1
TABLET ORAL
Qty: 450 TABLET | Refills: 1 | Status: SHIPPED | OUTPATIENT
Start: 2019-11-01 | End: 2020-04-24 | Stop reason: SDUPTHER

## 2019-11-01 RX ORDER — VENLAFAXINE HYDROCHLORIDE 150 MG/1
CAPSULE, EXTENDED RELEASE ORAL
Refills: 3 | COMMUNITY
Start: 2019-10-09 | End: 2020-02-04

## 2019-11-01 ASSESSMENT — PATIENT HEALTH QUESTIONNAIRE - PHQ9
SUM OF ALL RESPONSES TO PHQ QUESTIONS 1-9: 0
SUM OF ALL RESPONSES TO PHQ QUESTIONS 1-9: 0
SUM OF ALL RESPONSES TO PHQ9 QUESTIONS 1 & 2: 0
2. FEELING DOWN, DEPRESSED OR HOPELESS: 0
1. LITTLE INTEREST OR PLEASURE IN DOING THINGS: 0

## 2019-11-01 ASSESSMENT — ENCOUNTER SYMPTOMS
BACK PAIN: 1
RESPIRATORY NEGATIVE: 1

## 2019-11-19 ENCOUNTER — TELEPHONE (OUTPATIENT)
Dept: INTERNAL MEDICINE CLINIC | Age: 64
End: 2019-11-19

## 2019-11-19 ENCOUNTER — PATIENT MESSAGE (OUTPATIENT)
Dept: INTERNAL MEDICINE CLINIC | Age: 64
End: 2019-11-19

## 2019-11-20 RX ORDER — IBUPROFEN 400 MG/1
TABLET ORAL
Qty: 360 TABLET | Refills: 0 | Status: SHIPPED | OUTPATIENT
Start: 2019-11-20 | End: 2020-02-21

## 2020-01-10 RX ORDER — TIZANIDINE 4 MG/1
TABLET ORAL
Qty: 90 TABLET | Refills: 0 | Status: SHIPPED | OUTPATIENT
Start: 2020-01-10 | End: 2020-03-27

## 2020-01-10 RX ORDER — DICYCLOMINE HCL 20 MG
TABLET ORAL
Qty: 360 TABLET | Refills: 0 | Status: SHIPPED | OUTPATIENT
Start: 2020-01-10 | End: 2020-04-23

## 2020-01-27 RX ORDER — AZITHROMYCIN 500 MG/1
TABLET, FILM COATED ORAL
Qty: 3 TABLET | Refills: 0 | Status: SHIPPED | OUTPATIENT
Start: 2020-01-27 | End: 2020-02-06

## 2020-02-04 ENCOUNTER — OFFICE VISIT (OUTPATIENT)
Dept: INTERNAL MEDICINE CLINIC | Age: 65
End: 2020-02-04
Payer: MEDICARE

## 2020-02-04 VITALS
OXYGEN SATURATION: 98 % | HEART RATE: 102 BPM | SYSTOLIC BLOOD PRESSURE: 120 MMHG | WEIGHT: 176 LBS | HEIGHT: 69 IN | DIASTOLIC BLOOD PRESSURE: 68 MMHG | BODY MASS INDEX: 26.07 KG/M2

## 2020-02-04 PROBLEM — Z20.5 EXPOSURE TO HEPATITIS C: Status: ACTIVE | Noted: 2020-02-04

## 2020-02-04 PROCEDURE — G8484 FLU IMMUNIZE NO ADMIN: HCPCS | Performed by: INTERNAL MEDICINE

## 2020-02-04 PROCEDURE — G8427 DOCREV CUR MEDS BY ELIG CLIN: HCPCS | Performed by: INTERNAL MEDICINE

## 2020-02-04 PROCEDURE — G8419 CALC BMI OUT NRM PARAM NOF/U: HCPCS | Performed by: INTERNAL MEDICINE

## 2020-02-04 PROCEDURE — 4004F PT TOBACCO SCREEN RCVD TLK: CPT | Performed by: INTERNAL MEDICINE

## 2020-02-04 PROCEDURE — 99214 OFFICE O/P EST MOD 30 MIN: CPT | Performed by: INTERNAL MEDICINE

## 2020-02-04 PROCEDURE — 3017F COLORECTAL CA SCREEN DOC REV: CPT | Performed by: INTERNAL MEDICINE

## 2020-02-04 RX ORDER — CLONIDINE HYDROCHLORIDE 0.1 MG/1
0.1 TABLET ORAL NIGHTLY
Qty: 30 TABLET | Refills: 5 | Status: SHIPPED | OUTPATIENT
Start: 2020-02-04 | End: 2020-02-05

## 2020-02-04 RX ORDER — VENLAFAXINE HYDROCHLORIDE 75 MG/1
CAPSULE, EXTENDED RELEASE ORAL
Qty: 270 CAPSULE | Refills: 3 | Status: SHIPPED | OUTPATIENT
Start: 2020-02-04 | End: 2021-01-19

## 2020-02-04 ASSESSMENT — PATIENT HEALTH QUESTIONNAIRE - PHQ9
1. LITTLE INTEREST OR PLEASURE IN DOING THINGS: 0
SUM OF ALL RESPONSES TO PHQ QUESTIONS 1-9: 0
SUM OF ALL RESPONSES TO PHQ9 QUESTIONS 1 & 2: 0
2. FEELING DOWN, DEPRESSED OR HOPELESS: 0
SUM OF ALL RESPONSES TO PHQ QUESTIONS 1-9: 0

## 2020-02-04 ASSESSMENT — ENCOUNTER SYMPTOMS
BACK PAIN: 1
GASTROINTESTINAL NEGATIVE: 1

## 2020-02-04 NOTE — PROGRESS NOTES
belsomraSUBJECTIVE:  Patient ID: Garcia Quiñones is an 62 y.o. female. HPI: Patient here today for the f/u of chronic problems-- see Problem List and associated comments. New issues or complaints include (alsosee Assessment for more details): Patient for follow-up on her medications. She is doing well with her lorazepam.    Her  has hepatitis C and she desires testing to see if she could have the infection. She is having sleep problems- over-the-counter medications have not worked for more than a day or 2. She has tried some medications in the past.  She is concerned about interactions with her other medications. She feels like it of increasing her Effexor may help as well. It might help her sleep and additionally might help with some of her neuropathy in her feet. Review of Systems   Constitutional: Negative for fever. Gastrointestinal: Negative. Musculoskeletal: Positive for arthralgias and back pain. Neurological: Positive for numbness (Neuropathy feet). Psychiatric/Behavioral: Positive for sleep disturbance. Negative for dysphoric mood and suicidal ideas. The patient is nervous/anxious. OBJECTIVE:    /68 (Site: Right Upper Arm, Position: Sitting, Cuff Size: Large Adult)   Pulse 102   Ht 5' 8.5\" (1.74 m)   Wt 176 lb (79.8 kg)   SpO2 98%   BMI 26.37 kg/m²      Physical Exam  Eyes:      General: No scleral icterus. Extraocular Movements: Extraocular movements intact. Cardiovascular:      Rate and Rhythm: Normal rate. Pulmonary:      Effort: No respiratory distress. Skin:     Coloration: Skin is not jaundiced or pale. Neurological:      General: No focal deficit present. Mental Status: She is alert. Psychiatric:         Mood and Affect: Mood normal.         Behavior: Behavior normal.         Thought Content: Thought content normal.         Judgment: Judgment normal.         ASSESSMENT:       Encounter Diagnoses   Name Primary?     Primary insomnia Yes    Exposure to hepatitis C     KEVIN (generalized anxiety disorder)     Fibromyalgia     Medical marijuana use        KEVIN (generalized anxiety disorder)  Increase Effexor to 225 mg. Monitor report done. Lorazepam already refilled last week. Fibromyalgia  Increase Effexor to 225 mg    Primary insomnia  Continues to fail OTC medications. Gave Rx for Belsomra to see if it will be covered. If not she may try clonidine at bedtime. Medical marijuana use  THC at night causes hangover effect the next day. She must use earlier in the day. Exposure to hepatitis C  Check labs today. PLAN:See ASSESSMENT for evaluation & PLAN     Orders Placed This Encounter   Procedures    Comprehensive Metabolic Panel     Standing Status:   Future     Standing Expiration Date:   2/3/2021    Hepatitis C Antibody     Standing Status:   Future     Standing Expiration Date:   2/3/2021       PSH, PMH, SH and FH reviewed and noted. Recent and past labs, tests and consultsalso reviewed. Recent or new meds also reviewed.

## 2020-02-05 RX ORDER — CLONIDINE HYDROCHLORIDE 0.1 MG/1
0.1 TABLET ORAL NIGHTLY
Qty: 90 TABLET | Refills: 1 | Status: SHIPPED | OUTPATIENT
Start: 2020-02-05 | End: 2020-04-24 | Stop reason: SDUPTHER

## 2020-02-13 RX ORDER — FLUCONAZOLE 150 MG/1
TABLET ORAL
Qty: 1 TABLET | Refills: 0 | Status: SHIPPED | OUTPATIENT
Start: 2020-02-13 | End: 2020-07-10 | Stop reason: ALTCHOICE

## 2020-02-14 ENCOUNTER — HOSPITAL ENCOUNTER (OUTPATIENT)
Age: 65
Discharge: HOME | End: 2020-02-14
Payer: MEDICARE

## 2020-02-14 LAB
A/G RATIO: 1.6 (ref 1.1–2.2)
ALBUMIN SERPL-MCNC: 4.6 G/DL (ref 3.4–5)
ALP BLD-CCNC: 136 U/L (ref 40–129)
ALT SERPL-CCNC: 18 U/L (ref 10–40)
ANION GAP SERPL CALCULATED.3IONS-SCNC: 17 MMOL/L (ref 3–16)
AST SERPL-CCNC: 17 U/L (ref 15–37)
BILIRUB SERPL-MCNC: <0.2 MG/DL (ref 0–1)
BUN BLDV-MCNC: 6 MG/DL (ref 7–20)
CALCIUM SERPL-MCNC: 9.7 MG/DL (ref 8.3–10.6)
CHLORIDE BLD-SCNC: 93 MMOL/L (ref 99–110)
CO2: 27 MMOL/L (ref 21–32)
CREAT SERPL-MCNC: 0.6 MG/DL (ref 0.6–1.2)
GFR AFRICAN AMERICAN: >60
GFR NON-AFRICAN AMERICAN: >60
GLOBULIN: 2.8 G/DL
GLUCOSE BLD-MCNC: 94 MG/DL (ref 70–99)
HEPATITIS C ANTIBODY INTERPRETATION: NORMAL
POTASSIUM SERPL-SCNC: 4.3 MMOL/L (ref 3.5–5.1)
SODIUM BLD-SCNC: 137 MMOL/L (ref 136–145)
TOTAL PROTEIN: 7.4 G/DL (ref 6.4–8.2)

## 2020-02-14 PROCEDURE — 80053 COMPREHEN METABOLIC PANEL: CPT

## 2020-02-14 PROCEDURE — 86803 HEPATITIS C AB TEST: CPT

## 2020-02-14 PROCEDURE — 36415 COLL VENOUS BLD VENIPUNCTURE: CPT

## 2020-02-21 RX ORDER — IBUPROFEN 400 MG/1
TABLET ORAL
Qty: 360 TABLET | Refills: 0 | Status: SHIPPED | OUTPATIENT
Start: 2020-02-21 | End: 2021-02-04

## 2020-03-17 RX ORDER — ALBUTEROL SULFATE 90 UG/1
AEROSOL, METERED RESPIRATORY (INHALATION)
Qty: 54 G | Refills: 3 | Status: SHIPPED | OUTPATIENT
Start: 2020-03-17

## 2020-03-20 ENCOUNTER — TELEPHONE (OUTPATIENT)
Dept: INTERNAL MEDICINE CLINIC | Age: 65
End: 2020-03-20

## 2020-04-23 RX ORDER — DICYCLOMINE HCL 20 MG
TABLET ORAL
Qty: 360 TABLET | Refills: 0 | Status: SHIPPED | OUTPATIENT
Start: 2020-04-23 | End: 2020-07-22

## 2020-04-23 RX ORDER — TIZANIDINE 4 MG/1
TABLET ORAL
Qty: 90 TABLET | Refills: 0 | Status: SHIPPED | OUTPATIENT
Start: 2020-04-23 | End: 2020-06-22

## 2020-04-24 ENCOUNTER — TELEPHONE (OUTPATIENT)
Dept: INTERNAL MEDICINE CLINIC | Age: 65
End: 2020-04-24

## 2020-04-24 RX ORDER — CLONIDINE HYDROCHLORIDE 0.1 MG/1
0.1 TABLET ORAL NIGHTLY
Qty: 90 TABLET | Refills: 1 | Status: SHIPPED | OUTPATIENT
Start: 2020-04-24 | End: 2020-07-10 | Stop reason: SDUPTHER

## 2020-04-24 RX ORDER — LORAZEPAM 1 MG/1
TABLET ORAL
Qty: 450 TABLET | Refills: 0
Start: 2020-04-24 | End: 2020-07-10 | Stop reason: SDUPTHER

## 2020-06-22 RX ORDER — TIZANIDINE 4 MG/1
TABLET ORAL
Qty: 90 TABLET | Refills: 0 | Status: SHIPPED | OUTPATIENT
Start: 2020-06-22 | End: 2020-08-06

## 2020-07-01 ENCOUNTER — TELEPHONE (OUTPATIENT)
Dept: INTERNAL MEDICINE CLINIC | Age: 65
End: 2020-07-01

## 2020-07-01 ENCOUNTER — PATIENT MESSAGE (OUTPATIENT)
Dept: INTERNAL MEDICINE CLINIC | Age: 65
End: 2020-07-01

## 2020-07-01 RX ORDER — CLONIDINE HYDROCHLORIDE 0.1 MG/1
TABLET ORAL
Qty: 180 TABLET | Refills: 2 | Status: SHIPPED | OUTPATIENT
Start: 2020-07-01 | End: 2021-03-29

## 2020-07-01 RX ORDER — CLONIDINE HYDROCHLORIDE 0.1 MG/1
0.1 TABLET ORAL 2 TIMES DAILY
Qty: 60 TABLET | Refills: 5 | Status: SHIPPED | OUTPATIENT
Start: 2020-07-01 | End: 2020-07-01

## 2020-07-01 NOTE — TELEPHONE ENCOUNTER
Clonidine 0.1 mg    #60      1 twice daily        refill 5  She can do a video visit for her Ativan refill

## 2020-07-10 ENCOUNTER — VIRTUAL VISIT (OUTPATIENT)
Dept: INTERNAL MEDICINE CLINIC | Age: 65
End: 2020-07-10
Payer: MEDICARE

## 2020-07-10 PROCEDURE — 99214 OFFICE O/P EST MOD 30 MIN: CPT | Performed by: INTERNAL MEDICINE

## 2020-07-10 PROCEDURE — 3017F COLORECTAL CA SCREEN DOC REV: CPT | Performed by: INTERNAL MEDICINE

## 2020-07-10 PROCEDURE — G8427 DOCREV CUR MEDS BY ELIG CLIN: HCPCS | Performed by: INTERNAL MEDICINE

## 2020-07-10 RX ORDER — LORAZEPAM 1 MG/1
TABLET ORAL
Qty: 150 TABLET | Refills: 2 | Status: SHIPPED | OUTPATIENT
Start: 2020-07-10 | End: 2020-10-02 | Stop reason: SDUPTHER

## 2020-07-10 ASSESSMENT — ENCOUNTER SYMPTOMS: RESPIRATORY NEGATIVE: 1

## 2020-07-10 NOTE — ASSESSMENT & PLAN NOTE
Monitor report done. Refill lorazepam.  She is doing very well especially given the fact that her  is going through an alcohol abuse program after being hospitalized.

## 2020-07-10 NOTE — PROGRESS NOTES
SUBJECTIVE:  Patient ID: Jhon Padron is an 62 y.o. female. HPI: Patient here today for the f/u of chronic problems-- see Problem List and associated comments. New issues or complaints include (also see Assessment for more details):      TELEHEALTH EVALUATION -- Audio/Visual (During GACVX-78 public health emergency)    Pursuant to the emergency declaration under the [address] [place] [address] authority and the [employer] and [employer] Act, this Virtual  Visit was conducted, with patient's consent, to reduce the patient's risk of exposure to COVID-19 and provide continuity of care for an established patient. Services were provided through a video synchronous discussion virtually to substitute for in-person clinic visit. Patient on follow-up today for her lorazepam.  Between the lorazepam and the remainder of her medications per pain physician she is doing well. She is under considerable amount of stress at this point time since her  was just admitted to the hospital and then released to an outpatient program for alcohol abuse. She does not drink. She is glad he is going through the program and trying to quit since his alcohol use was causing considerable stress upon her as well. She has remained asymptomatic during the [place] pandemic. Review of Systems   Constitutional: Negative for fever. Respiratory: Negative. Cardiovascular: Negative. Negative for palpitations. Psychiatric/Behavioral: Positive for sleep disturbance. Negative for agitation, dysphoric mood and suicidal ideas. The patient is nervous/anxious. OBJECTIVE:    There were no vitals taken for this visit. Physical Exam  Constitutional:       Appearance: Normal appearance. She is not ill-appearing. Pulmonary:      Effort: No respiratory distress. Neurological:      Mental Status: She is alert.    Psychiatric:         Attention and Perception: Attention normal.         Mood and Affect: Mood normal. Mood is not anxious or depressed. Affect is not angry. Speech: Speech normal.         Behavior: Behavior normal. Behavior is not agitated. Thought Content: Thought content normal.         Cognition and Memory: Cognition normal.         Judgment: Judgment normal.         ASSESSMENT:       Encounter Diagnoses   Name Primary?  KEVIN (generalized anxiety disorder)     Essential hypertension     Sinus tachycardia        KEVIN (generalized anxiety disorder)  Monitor report done. Refill lorazepam.  She is doing very well especially given the fact that her  is going through an alcohol abuse program after being hospitalized. Essential hypertension  Home blood pressure usually okay-slightly elevated today. Sinus tachycardia  Slightly elevated today. He has been doing well with the medications-diltiazem-no changes        PLAN:See ASSESSMENT for evaluation & PLAN     No orders of the defined types were placed in this encounter.    , PMH, SH and FH reviewed and noted. Recent and past labs, tests and consultsalso reviewed. Recent or new meds also reviewed.

## 2020-07-22 RX ORDER — FLUTICASONE PROPIONATE 50 MCG
SPRAY, SUSPENSION (ML) NASAL
Qty: 48 G | Refills: 2 | Status: SHIPPED | OUTPATIENT
Start: 2020-07-22 | End: 2021-07-08

## 2020-07-22 RX ORDER — DICYCLOMINE HCL 20 MG
TABLET ORAL
Qty: 360 TABLET | Refills: 0 | Status: SHIPPED | OUTPATIENT
Start: 2020-07-22 | End: 2020-08-31

## 2020-07-22 RX ORDER — TRIAMTERENE AND HYDROCHLOROTHIAZIDE 37.5; 25 MG/1; MG/1
TABLET ORAL
Qty: 90 TABLET | Refills: 3 | Status: SHIPPED | OUTPATIENT
Start: 2020-07-22 | End: 2021-06-16

## 2020-08-06 RX ORDER — TIZANIDINE 4 MG/1
TABLET ORAL
Qty: 90 TABLET | Refills: 0 | Status: SHIPPED | OUTPATIENT
Start: 2020-08-06 | End: 2020-09-10

## 2020-08-31 RX ORDER — OMEPRAZOLE 40 MG/1
CAPSULE, DELAYED RELEASE ORAL
Qty: 180 CAPSULE | Refills: 3 | Status: SHIPPED | OUTPATIENT
Start: 2020-08-31 | End: 2021-09-07 | Stop reason: SDUPTHER

## 2020-08-31 RX ORDER — DICYCLOMINE HCL 20 MG
TABLET ORAL
Qty: 360 TABLET | Refills: 1 | Status: SHIPPED | OUTPATIENT
Start: 2020-08-31 | End: 2021-03-08

## 2020-09-10 RX ORDER — ESTRADIOL 2 MG/1
TABLET ORAL
Qty: 90 TABLET | Refills: 3 | Status: SHIPPED | OUTPATIENT
Start: 2020-09-10 | End: 2021-12-01

## 2020-09-10 RX ORDER — DILTIAZEM HYDROCHLORIDE 240 MG/1
CAPSULE, EXTENDED RELEASE ORAL
Qty: 90 CAPSULE | Refills: 3 | Status: SHIPPED | OUTPATIENT
Start: 2020-09-10 | End: 2020-11-13 | Stop reason: SDUPTHER

## 2020-09-10 RX ORDER — TIZANIDINE 4 MG/1
TABLET ORAL
Qty: 90 TABLET | Refills: 0 | Status: SHIPPED | OUTPATIENT
Start: 2020-09-10 | End: 2020-10-29

## 2020-09-23 ENCOUNTER — TELEPHONE (OUTPATIENT)
Dept: INTERNAL MEDICINE CLINIC | Age: 65
End: 2020-09-23

## 2020-09-23 RX ORDER — HYDROXYZINE HYDROCHLORIDE 25 MG/1
TABLET, FILM COATED ORAL
Qty: 21 TABLET | Refills: 0 | Status: SHIPPED | OUTPATIENT
Start: 2020-09-23 | End: 2021-05-07 | Stop reason: ALTCHOICE

## 2020-09-23 RX ORDER — PREDNISONE 20 MG/1
TABLET ORAL
Qty: 8 TABLET | Refills: 0 | Status: SHIPPED | OUTPATIENT
Start: 2020-09-23 | End: 2021-01-08 | Stop reason: ALTCHOICE

## 2020-09-23 NOTE — TELEPHONE ENCOUNTER
Prescription Question     From   Gely Min  To   [facility] Practice Support  Sent   9/23/2020  1:58 PM    Hi,   I have a maddening rash all over.  Starts with pink hives, if I scratch it,it gets raised and bumpy. Now my head is starting, too. I have been taking a brain booster, it's got alot of stuff I don't even know what it is!  I'll stop that,But. .. Anshul Vasquez I need something for this itching!!!!!!  A pill, I guess, it's ALL OVER, EVEN IN MY LEFT EAR. Katina Jacob!!!   Thanks, Kain Newby

## 2020-09-23 NOTE — TELEPHONE ENCOUNTER
Prednisone 20 mg #8     2 stat then 1 daily till gone  Hydroxyzine 25 mg     #21        1 3 times daily as needed itching

## 2020-10-02 RX ORDER — LORAZEPAM 1 MG/1
TABLET ORAL
Qty: 150 TABLET | Refills: 1 | Status: SHIPPED | OUTPATIENT
Start: 2020-10-02 | End: 2020-11-30 | Stop reason: SDUPTHER

## 2020-10-29 RX ORDER — TIZANIDINE 4 MG/1
TABLET ORAL
Qty: 90 TABLET | Refills: 0 | Status: SHIPPED | OUTPATIENT
Start: 2020-10-29 | End: 2020-12-08 | Stop reason: SDUPTHER

## 2020-11-12 ENCOUNTER — PATIENT MESSAGE (OUTPATIENT)
Dept: INTERNAL MEDICINE CLINIC | Age: 65
End: 2020-11-12

## 2020-11-12 ENCOUNTER — TELEPHONE (OUTPATIENT)
Dept: INTERNAL MEDICINE CLINIC | Age: 65
End: 2020-11-12

## 2020-11-12 NOTE — TELEPHONE ENCOUNTER
Prescription Question     From   Saturnino Rae  To   [facility] 111 Practice Support  Sent   11/12/2020 11:47 AM    Hi, Yesterday, I got very irritated, nothing in particular just alot of things to do. Last week, lots of pain. ..arthritis, feet thing, weather changing.  My /94. Pulse 90. Took extra clonidine, 12:00p.   Watched it till 4P,  162/80 [place].  I took an atenolol 50mg.  Although, they are from 2/29/19. By 8:00p. ..140/74  74.  This morning,  5a  took catapress, ativan, atenolol.  7a. ..138/71. Monico.Germán.   Then took my dilatizem.  11a,  140/68.  Should I start taking the atenolol again?  The dilatizem never seemed to do much. Going to rest today.  Let me know.   .Thanks, Kain Newby

## 2020-11-13 RX ORDER — DILTIAZEM HYDROCHLORIDE 240 MG/1
CAPSULE, EXTENDED RELEASE ORAL
Qty: 90 CAPSULE | Refills: 3
Start: 2020-11-13 | End: 2021-12-01

## 2020-11-30 ENCOUNTER — TELEPHONE (OUTPATIENT)
Dept: INTERNAL MEDICINE CLINIC | Age: 65
End: 2020-11-30

## 2020-11-30 RX ORDER — LORAZEPAM 1 MG/1
TABLET ORAL
Qty: 35 TABLET | Refills: 0 | Status: SHIPPED | OUTPATIENT
Start: 2020-11-30 | End: 2020-12-08

## 2020-11-30 NOTE — TELEPHONE ENCOUNTER
bandar didn't let her know till Friday, pt needs adivan called into [facility] [address], [address]  [address] [facility] [address] - F [phone]

## 2020-11-30 NOTE — TELEPHONE ENCOUNTER
Oarrs ran  Last filled 10/29/20  Last seen 7/10/20  No appt       Can do 7 day script.    Sent to on call

## 2020-12-02 ENCOUNTER — TELEPHONE (OUTPATIENT)
Dept: INTERNAL MEDICINE CLINIC | Age: 65
End: 2020-12-02

## 2020-12-02 NOTE — TELEPHONE ENCOUNTER
Patient has an AWV on 12/24/20 and needs a short term supply of LORazepam (ATIVAN) 1 MG tablet sent to [facility] [address], [address] [address] [phone] - F [phone]     Please advise.

## 2020-12-08 ENCOUNTER — TELEPHONE (OUTPATIENT)
Dept: INTERNAL MEDICINE CLINIC | Age: 65
End: 2020-12-08

## 2020-12-08 RX ORDER — TIZANIDINE 4 MG/1
TABLET ORAL
Qty: 90 TABLET | Refills: 0 | Status: CANCELLED | OUTPATIENT
Start: 2020-12-08

## 2020-12-08 RX ORDER — LORAZEPAM 1 MG/1
TABLET ORAL
Qty: 150 TABLET | Refills: 2 | Status: SHIPPED | OUTPATIENT
Start: 2020-12-08 | End: 2021-03-01

## 2020-12-08 RX ORDER — TIZANIDINE 4 MG/1
TABLET ORAL
Qty: 90 TABLET | Refills: 2 | Status: SHIPPED | OUTPATIENT
Start: 2020-12-08 | End: 2021-03-01

## 2020-12-08 NOTE — TELEPHONE ENCOUNTER
Medication Refill:     LORazepam (ATIVAN) 1 MG tablet [[id number]    She got these on 11/30/20. She states it was enough until Dr. Karolina Wood got back from vacation. She only got 35 tablets on this date.     tiZANidine (ZANAFLEX) 4 MG tablet [[id number]    [facility] [address], [address] [address] - F [phone]

## 2020-12-09 RX ORDER — LORAZEPAM 1 MG/1
TABLET ORAL
Qty: 35 TABLET | Refills: 0 | OUTPATIENT
Start: 2020-12-09 | End: 2021-01-07

## 2020-12-14 ENCOUNTER — PATIENT MESSAGE (OUTPATIENT)
Dept: INTERNAL MEDICINE CLINIC | Age: 65
End: 2020-12-14

## 2020-12-14 ENCOUNTER — TELEPHONE (OUTPATIENT)
Dept: INTERNAL MEDICINE CLINIC | Age: 65
End: 2020-12-14

## 2020-12-14 NOTE — TELEPHONE ENCOUNTER
Prescription Question    From   Yanelis Cortes To   [facility] 111 Practice Support Sent   12/14/2020  6:37 AM   Hi,   I started taking 2dilatizem as you said. Roxanna Kocher started getting dizzy spells and made me feel sick. Went back to atenolol 50, take it at night. B/P  has been 128/78. To. 130/ 80.  It's staying pretty steady. Can I keep taking this?   Need a new script.  Please send to [facility].  Will see you soon! [facility]

## 2020-12-14 NOTE — TELEPHONE ENCOUNTER
She can go back to 1 diltiazem per day, and 1 atenolol 50 mg/day. When she comes in for her appointment bring all of her medications I can be sure of everything she is on.

## 2021-01-08 ENCOUNTER — VIRTUAL VISIT (OUTPATIENT)
Dept: INTERNAL MEDICINE CLINIC | Age: 66
End: 2021-01-08
Payer: MEDICARE

## 2021-01-08 DIAGNOSIS — F41.1 GAD (GENERALIZED ANXIETY DISORDER): ICD-10-CM

## 2021-01-08 DIAGNOSIS — G44.309 POST-TRAUMATIC HEADACHE, NOT INTRACTABLE, UNSPECIFIED CHRONICITY PATTERN: ICD-10-CM

## 2021-01-08 DIAGNOSIS — Z00.00 ROUTINE GENERAL MEDICAL EXAMINATION AT A HEALTH CARE FACILITY: ICD-10-CM

## 2021-01-08 DIAGNOSIS — Z00.00 PREVENTATIVE HEALTH CARE: Primary | ICD-10-CM

## 2021-01-08 DIAGNOSIS — K08.9 DENTAL DISEASE: ICD-10-CM

## 2021-01-08 DIAGNOSIS — Z12.31 ENCOUNTER FOR SCREENING MAMMOGRAM FOR BREAST CANCER: ICD-10-CM

## 2021-01-08 DIAGNOSIS — R00.0 SINUS TACHYCARDIA: ICD-10-CM

## 2021-01-08 DIAGNOSIS — I10 ESSENTIAL HYPERTENSION: ICD-10-CM

## 2021-01-08 PROCEDURE — 3017F COLORECTAL CA SCREEN DOC REV: CPT | Performed by: INTERNAL MEDICINE

## 2021-01-08 PROCEDURE — G8419 CALC BMI OUT NRM PARAM NOF/U: HCPCS | Performed by: INTERNAL MEDICINE

## 2021-01-08 PROCEDURE — G8427 DOCREV CUR MEDS BY ELIG CLIN: HCPCS | Performed by: INTERNAL MEDICINE

## 2021-01-08 PROCEDURE — 99214 OFFICE O/P EST MOD 30 MIN: CPT | Performed by: INTERNAL MEDICINE

## 2021-01-08 PROCEDURE — 4004F PT TOBACCO SCREEN RCVD TLK: CPT | Performed by: INTERNAL MEDICINE

## 2021-01-08 PROCEDURE — G8484 FLU IMMUNIZE NO ADMIN: HCPCS | Performed by: INTERNAL MEDICINE

## 2021-01-08 PROCEDURE — G0439 PPPS, SUBSEQ VISIT: HCPCS | Performed by: INTERNAL MEDICINE

## 2021-01-08 RX ORDER — ATENOLOL 50 MG/1
50 TABLET ORAL DAILY
Qty: 90 TABLET | Refills: 3 | Status: SHIPPED | OUTPATIENT
Start: 2021-01-08 | End: 2021-12-01 | Stop reason: SDUPTHER

## 2021-01-08 RX ORDER — PENICILLIN V POTASSIUM 500 MG/1
500 TABLET ORAL 4 TIMES DAILY
Qty: 40 TABLET | Refills: 0 | Status: SHIPPED | OUTPATIENT
Start: 2021-01-08 | End: 2021-05-07 | Stop reason: ALTCHOICE

## 2021-01-08 ASSESSMENT — PATIENT HEALTH QUESTIONNAIRE - PHQ9
1. LITTLE INTEREST OR PLEASURE IN DOING THINGS: 0
SUM OF ALL RESPONSES TO PHQ QUESTIONS 1-9: 0
7. TROUBLE CONCENTRATING ON THINGS, SUCH AS READING THE NEWSPAPER OR WATCHING TELEVISION: 0
6. FEELING BAD ABOUT YOURSELF - OR THAT YOU ARE A FAILURE OR HAVE LET YOURSELF OR YOUR FAMILY DOWN: 0
3. TROUBLE FALLING OR STAYING ASLEEP: 0
8. MOVING OR SPEAKING SO SLOWLY THAT OTHER PEOPLE COULD HAVE NOTICED. OR THE OPPOSITE, BEING SO FIGETY OR RESTLESS THAT YOU HAVE BEEN MOVING AROUND A LOT MORE THAN USUAL: 0
4. FEELING TIRED OR HAVING LITTLE ENERGY: 0
2. FEELING DOWN, DEPRESSED OR HOPELESS: 0
10. IF YOU CHECKED OFF ANY PROBLEMS, HOW DIFFICULT HAVE THESE PROBLEMS MADE IT FOR YOU TO DO YOUR WORK, TAKE CARE OF THINGS AT HOME, OR GET ALONG WITH OTHER PEOPLE: 0
SUM OF ALL RESPONSES TO PHQ QUESTIONS 1-9: 0

## 2021-01-08 ASSESSMENT — ENCOUNTER SYMPTOMS
SHORTNESS OF BREATH: 0
DIARRHEA: 0
CONSTIPATION: 0

## 2021-01-08 NOTE — ASSESSMENT & PLAN NOTE
Headache near top of her head where she fell several months ago. The area is tender to the touch as well. No other neurologic signs.   Will CT to rule out hygroma, chronic subdural.

## 2021-01-08 NOTE — ASSESSMENT & PLAN NOTE
BP gets elevated when her pain level goes up when she is anxious. She did better with the combination of diltiazem and atenolol. We will go back to diltiazem in the morning and 50 mg of atenolol in the evening.

## 2021-01-08 NOTE — ASSESSMENT & PLAN NOTE
She has a tooth that needs extraction. She may have a cavity. She cannot get into her dentist for a while.

## 2021-01-08 NOTE — PROGRESS NOTES
SUBJECTIVE:  Patient ID: Anny Mercado is an 60 y.o. female. HPI: Patient here today for the f/u of chronic problems-- see Problem List and associated comments. New issues or complaints include (also see Assessment for more details): Patient here today for follow-up on her medications and for a few issuesdental disease with tooth ache and she is not able to get into her dentist for a while; headache on the top and back of her head which is intermittent and tender to the touchshe had a fall approximately 4 months ago and struck her head; she is still getting some elevated blood pressure and occasionally a fast pulse rate around 100. She did much better when she was on a combination of diltiazem and atenolol. She has increased the diltiazem without much benefit. TELEHEALTH EVALUATION -- Audio/Visual (During JVQMG-71 public health emergency)    Pursuant to the emergency declaration under the [address] [place] [address] [facility] authority and the [employer] Act, this Virtual  Visit was conducted, with patient's consent, to reduce the patient's risk of exposure to COVID-19 and provide continuity of care for an established patient. Services were provided through a video synchronous discussion virtually to substitute for in-person clinic visit. Review of Systems   Constitutional: Negative for fever. HENT: Positive for dental problem. Eyes: Negative for visual disturbance. Respiratory: Negative for shortness of breath. Cardiovascular: Negative for palpitations. Gastrointestinal: Negative for constipation and diarrhea. Genitourinary: Negative. Negative for difficulty urinating. Musculoskeletal: Positive for myalgias. Skin: Negative for rash. Allergic/Immunologic: Negative for immunocompromised state. Neurological: Positive for headaches. Psychiatric/Behavioral: Negative for dysphoric mood. The patient is nervous/anxious. OBJECTIVE:    There were no vitals taken for this visit. Physical Exam  Constitutional:       General: She is not in acute distress. Appearance: Normal appearance. She is not ill-appearing. Eyes:      Extraocular Movements: Extraocular movements intact. Pulmonary:      Effort: Pulmonary effort is normal. No respiratory distress. Skin:     Coloration: Skin is not pale. Neurological:      General: No focal deficit present. Mental Status: She is alert and oriented to person, place, and time. Cranial Nerves: No cranial nerve deficit. Psychiatric:         Attention and Perception: Attention normal.         Mood and Affect: Mood normal.         Speech: Speech normal.         Behavior: Behavior normal.         Thought Content: Thought content normal.         Cognition and Memory: Cognition normal.         Judgment: Judgment normal.         ASSESSMENT:       Encounter Diagnoses   Name Primary?  Routine general medical examination at a health care facility Yes    Encounter for screening mammogram for breast cancer     Post-traumatic headache, not intractable, unspecified chronicity pattern     Dental disease     KEVIN (generalized anxiety disorder)     Essential hypertension     Sinus tachycardia     Preventative health care        Post-traumatic headache  Headache near top of her head where she fell several months ago. The area is tender to the touch as well. No other neurologic signs. Will CT to rule out hygroma, chronic subdural.    Dental disease  She has a tooth that needs extraction. She may have a cavity. She cannot get into her dentist for a while. KEVIN (generalized anxiety disorder)  Monitor report done. Continue Ativan. Doing well with Rx.     Essential hypertension BP gets elevated when her pain level goes up when she is anxious. She did better with the combination of diltiazem and atenolol. We will go back to diltiazem in the morning and 50 mg of atenolol in the evening. Sinus tachycardia  Diltiazem has helped. We will continue with 240 mg every morning and readd atenolol in the evening for blood pressure and to assist with pulsethe beta-blocker helps when she is in pain to prevent tachycardia and elevation of blood pressure rise. Preventative health care  Here for checkup. No signs of infection during the pandemic. Notes her problems are still around musculoskeletal and anxiety issues. PLAN:See ASSESSMENT for evaluation & PLAN     Orders Placed This Encounter   Procedures    ROSELIA Digital Screen Bilateral [[id number]]     Standing Status:   Future     Standing Expiration Date:   1/8/2022    CT HEAD WO CONTRAST     Standing Status:   Future     Standing Expiration Date:   1/8/2022     Order Specific Question:   Reason for exam:     Answer:   Headache near crown posteriorlystatus post fall 4 months ago.     , PMH, SH and FH reviewed and noted. Recent and past labs, tests and consultsalso reviewed. Recent or new meds also reviewed. The patient encounter today included at least 1 out of 3 of the following:   - review of external notes, tests and outside assessments, or   - independent interpretation of outside tests performed by other HCPs, or   - discussion of management of tests and interpretations with outside HCPs.

## 2021-01-08 NOTE — ASSESSMENT & PLAN NOTE
Here for checkup. No signs of infection during the pandemic. Notes her problems are still around musculoskeletal and anxiety issues.

## 2021-01-08 NOTE — ASSESSMENT & PLAN NOTE
Diltiazem has helped. We will continue with 240 mg every morning and readd atenolol in the evening for blood pressure and to assist with pulsethe beta-blocker helps when she is in pain to prevent tachycardia and elevation of blood pressure rise.

## 2021-01-19 RX ORDER — VENLAFAXINE HYDROCHLORIDE 75 MG/1
CAPSULE, EXTENDED RELEASE ORAL
Qty: 270 CAPSULE | Refills: 3 | Status: SHIPPED | OUTPATIENT
Start: 2021-01-19 | End: 2021-05-07

## 2021-02-04 RX ORDER — IBUPROFEN 400 MG/1
TABLET ORAL
Qty: 360 TABLET | Refills: 3 | Status: SHIPPED | OUTPATIENT
Start: 2021-02-04 | End: 2022-02-16

## 2021-02-07 PROBLEM — Z00.00 PREVENTATIVE HEALTH CARE: Status: RESOLVED | Noted: 2021-01-08 | Resolved: 2021-02-07

## 2021-03-01 DIAGNOSIS — M47.816 FACET ARTHROPATHY, LUMBAR: ICD-10-CM

## 2021-03-01 DIAGNOSIS — F41.1 GAD (GENERALIZED ANXIETY DISORDER): ICD-10-CM

## 2021-03-01 RX ORDER — LORAZEPAM 1 MG/1
TABLET ORAL
Qty: 150 TABLET | Refills: 0 | Status: SHIPPED | OUTPATIENT
Start: 2021-03-01 | End: 2021-04-05

## 2021-03-01 RX ORDER — TIZANIDINE 4 MG/1
TABLET ORAL
Qty: 90 TABLET | Refills: 2 | Status: SHIPPED | OUTPATIENT
Start: 2021-03-01 | End: 2021-06-01

## 2021-03-08 RX ORDER — DICYCLOMINE HCL 20 MG
TABLET ORAL
Qty: 360 TABLET | Refills: 1 | Status: SHIPPED | OUTPATIENT
Start: 2021-03-08 | End: 2021-09-16 | Stop reason: SDUPTHER

## 2021-03-29 RX ORDER — CLONIDINE HYDROCHLORIDE 0.1 MG/1
TABLET ORAL
Qty: 180 TABLET | Refills: 2 | Status: SHIPPED | OUTPATIENT
Start: 2021-03-29 | End: 2021-12-01 | Stop reason: SDUPTHER

## 2021-04-03 DIAGNOSIS — F41.1 GAD (GENERALIZED ANXIETY DISORDER): ICD-10-CM

## 2021-04-05 RX ORDER — LORAZEPAM 1 MG/1
TABLET ORAL
Qty: 150 TABLET | Refills: 0 | Status: SHIPPED | OUTPATIENT
Start: 2021-04-05 | End: 2021-05-04

## 2021-05-04 DIAGNOSIS — F41.1 GAD (GENERALIZED ANXIETY DISORDER): ICD-10-CM

## 2021-05-04 RX ORDER — LORAZEPAM 1 MG/1
TABLET ORAL
Qty: 150 TABLET | Refills: 0 | Status: SHIPPED | OUTPATIENT
Start: 2021-05-04 | End: 2021-06-03

## 2021-05-07 ENCOUNTER — OFFICE VISIT (OUTPATIENT)
Dept: INTERNAL MEDICINE CLINIC | Age: 66
End: 2021-05-07
Payer: MEDICARE

## 2021-05-07 ENCOUNTER — HOSPITAL ENCOUNTER (OUTPATIENT)
Dept: GENERAL RADIOLOGY | Age: 66
Discharge: HOME | End: 2021-05-07
Payer: MEDICARE

## 2021-05-07 ENCOUNTER — HOSPITAL ENCOUNTER (OUTPATIENT)
Age: 66
Discharge: HOME | End: 2021-05-07
Payer: MEDICARE

## 2021-05-07 VITALS
DIASTOLIC BLOOD PRESSURE: 64 MMHG | WEIGHT: 173 LBS | BODY MASS INDEX: 26.22 KG/M2 | SYSTOLIC BLOOD PRESSURE: 120 MMHG | HEIGHT: 68 IN

## 2021-05-07 DIAGNOSIS — F41.8 DEPRESSION WITH ANXIETY: ICD-10-CM

## 2021-05-07 DIAGNOSIS — M79.7 FIBROMYALGIA: ICD-10-CM

## 2021-05-07 DIAGNOSIS — F17.210 CIGARETTE SMOKER: ICD-10-CM

## 2021-05-07 DIAGNOSIS — Z79.899 MEDICAL MARIJUANA USE: ICD-10-CM

## 2021-05-07 DIAGNOSIS — R06.2 WHEEZING: ICD-10-CM

## 2021-05-07 DIAGNOSIS — I10 ESSENTIAL HYPERTENSION: ICD-10-CM

## 2021-05-07 PROBLEM — Z20.5 EXPOSURE TO HEPATITIS C: Status: RESOLVED | Noted: 2020-02-04 | Resolved: 2021-05-07

## 2021-05-07 PROBLEM — G44.309 POST-TRAUMATIC HEADACHE: Status: RESOLVED | Noted: 2021-01-08 | Resolved: 2021-05-07

## 2021-05-07 PROBLEM — M43.10 DEGENERATIVE SPONDYLOLISTHESIS: Status: RESOLVED | Noted: 2017-07-24 | Resolved: 2021-05-07

## 2021-05-07 PROCEDURE — 71046 X-RAY EXAM CHEST 2 VIEWS: CPT

## 2021-05-07 PROCEDURE — G8419 CALC BMI OUT NRM PARAM NOF/U: HCPCS | Performed by: INTERNAL MEDICINE

## 2021-05-07 PROCEDURE — 3017F COLORECTAL CA SCREEN DOC REV: CPT | Performed by: INTERNAL MEDICINE

## 2021-05-07 PROCEDURE — G8427 DOCREV CUR MEDS BY ELIG CLIN: HCPCS | Performed by: INTERNAL MEDICINE

## 2021-05-07 PROCEDURE — 99214 OFFICE O/P EST MOD 30 MIN: CPT | Performed by: INTERNAL MEDICINE

## 2021-05-07 PROCEDURE — 4004F PT TOBACCO SCREEN RCVD TLK: CPT | Performed by: INTERNAL MEDICINE

## 2021-05-07 RX ORDER — VENLAFAXINE HYDROCHLORIDE 75 MG/1
150 CAPSULE, EXTENDED RELEASE ORAL DAILY
Qty: 270 CAPSULE | Refills: 3 | COMMUNITY
Start: 2021-05-07 | End: 2021-05-12 | Stop reason: ALTCHOICE

## 2021-05-07 RX ORDER — MONTELUKAST SODIUM 10 MG/1
10 TABLET ORAL DAILY
Qty: 30 TABLET | Refills: 5 | Status: SHIPPED | OUTPATIENT
Start: 2021-05-07 | End: 2021-05-10

## 2021-05-07 RX ORDER — DESVENLAFAXINE 50 MG/1
50 TABLET, EXTENDED RELEASE ORAL DAILY
Qty: 30 TABLET | Refills: 5 | Status: SHIPPED | OUTPATIENT
Start: 2021-05-07 | End: 2021-12-01

## 2021-05-07 RX ORDER — NICOTINE 21 MG/24HR
1 PATCH, TRANSDERMAL 24 HOURS TRANSDERMAL DAILY
Qty: 30 PATCH | Refills: 5 | Status: SHIPPED | OUTPATIENT
Start: 2021-05-07 | End: 2021-12-01

## 2021-05-07 ASSESSMENT — PATIENT HEALTH QUESTIONNAIRE - PHQ9
SUM OF ALL RESPONSES TO PHQ QUESTIONS 1-9: 0
SUM OF ALL RESPONSES TO PHQ9 QUESTIONS 1 & 2: 0
1. LITTLE INTEREST OR PLEASURE IN DOING THINGS: 0
SUM OF ALL RESPONSES TO PHQ QUESTIONS 1-9: 0

## 2021-05-07 ASSESSMENT — ENCOUNTER SYMPTOMS
RHINORRHEA: 1
BACK PAIN: 1
SHORTNESS OF BREATH: 0
GASTROINTESTINAL NEGATIVE: 1
WHEEZING: 1

## 2021-05-07 NOTE — ASSESSMENT & PLAN NOTE
allergies and possibly some emphysema. Her inhaler did not help. Add Singulair. Get chest x-ray. Medical marijuana vaping may be exacerbating the problem.

## 2021-05-07 NOTE — ASSESSMENT & PLAN NOTE
she is feeling more depressed and does not believe that the Effexor is working. Currently at 225 mg. Will decrease to 150 mg and add Pristiq 50 mg. We will see if this gives an added kick in benefit for her depression. Can always continue to wean down on the Effexor. She is willing to try this regimen.

## 2021-05-07 NOTE — PROGRESS NOTES
SUBJECTIVE:  Patient ID: Yossi Fuentes is an 60 y.o. female. HPI: Patient here today for the f/u of chronic problems-- see Problem List and associated comments. New issues or complaints include (also see Assessment for more details): She comes in today with a few complaints-she is feeling more depressed than usual.  Combination of seasonal affective disorder with the gray scars in cool weather. Also she is having more wheezing lately and upper respiratory drainage. She feels strongly that she has some type of rheumatological disorder. She would also like to see a cardiologist to make sure some of her breathing issues are noncardiac. She still gets medical marijuana. Review of Systems   Constitutional: Positive for fatigue. HENT: Positive for postnasal drip and rhinorrhea. Respiratory: Positive for wheezing. Negative for shortness of breath. Gastrointestinal: Negative. Musculoskeletal: Positive for arthralgias, back pain and myalgias. Psychiatric/Behavioral: Positive for dysphoric mood and sleep disturbance. Negative for suicidal ideas. The patient is nervous/anxious. OBJECTIVE:    /64 (Site: Right Upper Arm)   Ht 5' 8\" (1.727 m)   Wt 173 lb (78.5 kg)   BMI 26.30 kg/m²      Physical Exam  Constitutional:       Appearance: Normal appearance. Eyes:      General:         Right eye: No discharge. Left eye: No discharge. Cardiovascular:      Rate and Rhythm: Normal rate and regular rhythm. Heart sounds: No murmur. No gallop. Pulmonary:      Effort: Pulmonary effort is normal. No respiratory distress. Breath sounds: No stridor. Wheezing (Upper airway wheezes noted) present. No rhonchi or rales. Abdominal:      General: Bowel sounds are normal.   Musculoskeletal:      Right lower leg: No edema. Left lower leg: No edema. Skin:     Coloration: Skin is not jaundiced or pale. Neurological:      Mental Status: She is alert.       Cranial Nerves: Cranial nerves are intact. Psychiatric:         Mood and Affect: Mood is depressed. Mood is not anxious. Speech: Speech normal.         Behavior: Behavior is cooperative. Thought Content: Thought content does not include suicidal ideation. Cognition and Memory: Cognition normal.         ASSESSMENT:       Encounter Diagnoses   Name Primary?  Wheezing     Medical marijuana use     Essential hypertension     Cigarette smoker     Depression with anxiety     Fibromyalgia        Medical marijuana use    Vapes daily    Wheezing    allergies and possibly some emphysema. Her inhaler did not help. Add Singulair. Get chest x-ray. Medical marijuana vaping may be exacerbating the problem. Essential hypertension    BP okay    Cigarette smoker    she is not smoking currently. She is using the 21 mg nicotine patch daily. Gave Rx. Depression with anxiety    she is feeling more depressed and does not believe that the Effexor is working. Currently at 225 mg. Will decrease to 150 mg and add Pristiq 50 mg. We will see if this gives an added kick in benefit for her depression. Can always continue to wean down on the Effexor. She is willing to try this regimen. Fibromyalgia    refer to rheumatology for opinion. PLAN:See ASSESSMENT for evaluation & PLAN     Orders Placed This Encounter   Procedures    XR CHEST (2 VW)     Standing Status:   Future     Standing Expiration Date:   5/7/2022     , PMH, SH and  reviewed and noted. Recent and past labs, tests and consultsalso reviewed. Recent or new meds also reviewed.

## 2021-05-10 ENCOUNTER — PATIENT MESSAGE (OUTPATIENT)
Dept: INTERNAL MEDICINE CLINIC | Age: 66
End: 2021-05-10

## 2021-05-10 DIAGNOSIS — R00.0 SINUS TACHYCARDIA: Primary | ICD-10-CM

## 2021-05-10 DIAGNOSIS — J32.8 OTHER CHRONIC SINUSITIS: ICD-10-CM

## 2021-05-10 DIAGNOSIS — I10 ESSENTIAL HYPERTENSION: ICD-10-CM

## 2021-05-10 RX ORDER — MONTELUKAST SODIUM 10 MG/1
TABLET ORAL
Qty: 90 TABLET | Refills: 1 | Status: SHIPPED | OUTPATIENT
Start: 2021-05-10 | End: 2021-12-01

## 2021-05-10 NOTE — TELEPHONE ENCOUNTER
From: Yair Prince  To: Amrit Barnhart MD  Sent: 5/10/2021 10:32 AM EDT  Subject: Visit Follow-Up Question    Hi, please ask dr Marci Rouse to send referrals to Dr Yobany Cooley, and to Dr Teagan Herron. I need those for [facility].  Thanks

## 2021-05-12 ENCOUNTER — PATIENT MESSAGE (OUTPATIENT)
Dept: INTERNAL MEDICINE CLINIC | Age: 66
End: 2021-05-12

## 2021-05-12 ENCOUNTER — TELEPHONE (OUTPATIENT)
Dept: INTERNAL MEDICINE CLINIC | Age: 66
End: 2021-05-12

## 2021-05-12 RX ORDER — PREDNISONE 10 MG/1
10 TABLET ORAL DAILY
Qty: 7 TABLET | Refills: 0 | Status: SHIPPED | OUTPATIENT
Start: 2021-05-12 | End: 2021-05-14 | Stop reason: SDUPTHER

## 2021-05-12 NOTE — TELEPHONE ENCOUNTER
Prescription Question    From   Jacqueline Ramirez To   [facility] 111 Practice Support Sent   5/12/2021  7:42 AM   Hi,  I started Pristique on 5/10.  Singular on5/9.  Yesterday, I felt horrible!  Really irritable, memory worse, extremely weak and fatigued, confused and balance worse. I tripped up the steps on the porch and fell on left hip. Caught myself with arms, now,both hips, arms and sacrum hurts more than usual.  Also have a mild rash all over, feet are burning worse.  I see Pristique sucks sodium(?).  How long should it take to stop wheezing?  OK, I think that's it.  Sorry to bother you, thought it would be a good idea to let you know.   Kim

## 2021-05-14 ENCOUNTER — PATIENT MESSAGE (OUTPATIENT)
Dept: INTERNAL MEDICINE CLINIC | Age: 66
End: 2021-05-14

## 2021-05-14 ENCOUNTER — TELEPHONE (OUTPATIENT)
Dept: INTERNAL MEDICINE CLINIC | Age: 66
End: 2021-05-14

## 2021-05-14 RX ORDER — HYDROXYZINE HYDROCHLORIDE 25 MG/1
25 TABLET, FILM COATED ORAL EVERY 8 HOURS PRN
Qty: 30 TABLET | Refills: 0 | Status: SHIPPED | OUTPATIENT
Start: 2021-05-14 | End: 2021-05-24

## 2021-05-14 RX ORDER — PREDNISONE 10 MG/1
10 TABLET ORAL DAILY
Qty: 7 TABLET | Refills: 0 | Status: SHIPPED | OUTPATIENT
Start: 2021-05-14 | End: 2021-05-21

## 2021-05-24 ENCOUNTER — PATIENT MESSAGE (OUTPATIENT)
Dept: INTERNAL MEDICINE CLINIC | Age: 66
End: 2021-05-24

## 2021-05-24 ENCOUNTER — TELEPHONE (OUTPATIENT)
Dept: INTERNAL MEDICINE CLINIC | Age: 66
End: 2021-05-24

## 2021-05-24 RX ORDER — FLUCONAZOLE 100 MG/1
100 TABLET ORAL DAILY
Qty: 7 TABLET | Refills: 0 | Status: SHIPPED | OUTPATIENT
Start: 2021-05-24 | End: 2021-05-31

## 2021-05-24 RX ORDER — PREDNISONE 10 MG/1
10 TABLET ORAL DAILY
Qty: 10 TABLET | Refills: 0 | Status: SHIPPED | OUTPATIENT
Start: 2021-05-24 | End: 2021-06-03

## 2021-05-24 RX ORDER — HYDROXYZINE HYDROCHLORIDE 25 MG/1
25 TABLET, FILM COATED ORAL EVERY 6 HOURS PRN
Qty: 30 TABLET | Refills: 2 | Status: SHIPPED | OUTPATIENT
Start: 2021-05-24 | End: 2021-06-03

## 2021-05-24 NOTE — TELEPHONE ENCOUNTER
She probably needs an allergist.  Until then can call in some more of the hydroxyzine and I will put her on low-dose prednisone for few more days. These drugs have to be out of her system prior to any appointment with an allergist.  Will also call in a prescription for Diflucan. For allergist I recommend the [facility] allergy group.     Hydroxyzine 25 mg     #30        1 every 6 hours as needed itching        refill 2  Prednisone 10 mg   #10       1 daily  Diflucan 100 mg    #7      1 daily

## 2021-05-24 NOTE — TELEPHONE ENCOUNTER
Non-Urgent Medical Question    Joey Prado MD 2 minutes ago (9:16 AM)   [facility]  Hi,  the rash I've had was getting better, till I ran out of prednisone and itch pills. Saturday, it came back with a vengeance! It's in my hair, on my hands, on my butt, on my feet. Seems like hives. I have a yeast infection, I'm getting a cold sore, I have a bad tooth(will have that looked at tomorrow). I'm falling apart!!!  I don't have a fever. Help!

## 2021-05-28 ENCOUNTER — PATIENT MESSAGE (OUTPATIENT)
Dept: INTERNAL MEDICINE CLINIC | Age: 66
End: 2021-05-28

## 2021-05-28 RX ORDER — HYDROXYZINE HYDROCHLORIDE 25 MG/1
TABLET, FILM COATED ORAL
Qty: 40 TABLET | Refills: 1 | Status: SHIPPED | OUTPATIENT
Start: 2021-05-28 | End: 2021-12-01 | Stop reason: SDUPTHER

## 2021-05-28 RX ORDER — PREDNISONE 20 MG/1
20 TABLET ORAL DAILY
Qty: 7 TABLET | Refills: 0 | Status: SHIPPED | OUTPATIENT
Start: 2021-05-28 | End: 2021-06-04

## 2021-05-28 NOTE — TELEPHONE ENCOUNTER
From: Jacqueline Ramirez  To: Lashell Kamara MD  Sent: 5/28/2021 10:58 AM EDT  Subject: Visit Follow-Up Question    Hi, I called my allergist. He is not there anymore. He was with Lukasz Lundberg. The girl doesn't have an appointment open until August. It's the holiday, so, I can't go to the ER with a rash! Please , please call me more hydroxyzine hcl and prednisone to get me through the week. It's not worse, but not clearing up yet. I think it's stress. I have to make my tenants move cause we are selling mom and dad's house. It's really upsetting to me. I have to take 50 mg of hydroxyzine, 25 didn't help. Yeast gone, thanks.  Kim

## 2021-05-28 NOTE — TELEPHONE ENCOUNTER
Prednisone 20 mg #7    1 daily  Hydroxyzine 25 mg    #40       take 1-2 every 6 hours as needed itching       refill 1

## 2021-06-01 DIAGNOSIS — M47.816 FACET ARTHROPATHY, LUMBAR: ICD-10-CM

## 2021-06-01 RX ORDER — TIZANIDINE 4 MG/1
TABLET ORAL
Qty: 90 TABLET | Refills: 2 | Status: SHIPPED | OUTPATIENT
Start: 2021-06-01 | End: 2021-09-01

## 2021-06-16 RX ORDER — TRIAMTERENE AND HYDROCHLOROTHIAZIDE 37.5; 25 MG/1; MG/1
TABLET ORAL
Qty: 90 TABLET | Refills: 3 | Status: SHIPPED | OUTPATIENT
Start: 2021-06-16 | End: 2021-09-30 | Stop reason: SDUPTHER

## 2021-07-08 RX ORDER — FLUTICASONE PROPIONATE 50 MCG
SPRAY, SUSPENSION (ML) NASAL
Qty: 48 G | Refills: 2 | Status: SHIPPED | OUTPATIENT
Start: 2021-07-08 | End: 2021-08-30 | Stop reason: SDUPTHER

## 2021-08-19 ENCOUNTER — PATIENT MESSAGE (OUTPATIENT)
Dept: INTERNAL MEDICINE CLINIC | Age: 66
End: 2021-08-19

## 2021-08-28 DIAGNOSIS — M47.816 FACET ARTHROPATHY, LUMBAR: ICD-10-CM

## 2021-08-30 RX ORDER — FLUTICASONE PROPIONATE 50 MCG
SPRAY, SUSPENSION (ML) NASAL
Qty: 48 G | Refills: 2 | Status: SHIPPED | OUTPATIENT
Start: 2021-08-30 | End: 2021-12-01 | Stop reason: SDUPTHER

## 2021-08-30 NOTE — TELEPHONE ENCOUNTER
From: Yamile Middleton  Sent: 8/27/2021 1:00 PM EDT  To: [place] [address] Practice Support  Subject: RE: Prescription Question    I asked for a list of all of my meds sent to [facility] on [facility]. There is only one, ph. ..[phone]. I need proposed and Flonase ordered now, please. I didn't get a message in my email that you had responded. Sorry I am just getting back to you. They have a few, but, [facility] has the rest. I switched to [facility].  Thanks

## 2021-09-01 RX ORDER — TIZANIDINE 4 MG/1
TABLET ORAL
Qty: 90 TABLET | Refills: 2 | Status: SHIPPED | OUTPATIENT
Start: 2021-09-01 | End: 2021-12-01 | Stop reason: SDUPTHER

## 2021-09-07 RX ORDER — OMEPRAZOLE 40 MG/1
CAPSULE, DELAYED RELEASE ORAL
Qty: 180 CAPSULE | Refills: 3 | Status: SHIPPED | OUTPATIENT
Start: 2021-09-07 | End: 2022-10-07

## 2021-09-16 RX ORDER — VENLAFAXINE HYDROCHLORIDE 75 MG/1
225 CAPSULE, EXTENDED RELEASE ORAL DAILY
Qty: 90 CAPSULE | Refills: 1 | Status: SHIPPED | OUTPATIENT
Start: 2021-09-16 | End: 2021-10-28

## 2021-09-16 RX ORDER — DICYCLOMINE HCL 20 MG
TABLET ORAL
Qty: 120 TABLET | Refills: 1 | Status: SHIPPED | OUTPATIENT
Start: 2021-09-16 | End: 2022-02-08 | Stop reason: SDUPTHER

## 2021-09-30 ENCOUNTER — OFFICE VISIT (OUTPATIENT)
Dept: INTERNAL MEDICINE CLINIC | Age: 66
End: 2021-09-30
Payer: MEDICARE

## 2021-09-30 VITALS
HEIGHT: 68 IN | BODY MASS INDEX: 27.74 KG/M2 | SYSTOLIC BLOOD PRESSURE: 130 MMHG | WEIGHT: 183 LBS | DIASTOLIC BLOOD PRESSURE: 70 MMHG

## 2021-09-30 DIAGNOSIS — M79.7 FIBROMYALGIA: ICD-10-CM

## 2021-09-30 DIAGNOSIS — I10 ESSENTIAL HYPERTENSION: ICD-10-CM

## 2021-09-30 DIAGNOSIS — I73.81 ERYTHROMELALGIA (HCC): ICD-10-CM

## 2021-09-30 DIAGNOSIS — Z23 NEED FOR INFLUENZA VACCINATION: Primary | ICD-10-CM

## 2021-09-30 DIAGNOSIS — F41.8 DEPRESSION WITH ANXIETY: ICD-10-CM

## 2021-09-30 PROBLEM — R06.2 WHEEZING: Status: RESOLVED | Noted: 2021-05-07 | Resolved: 2021-09-30

## 2021-09-30 PROCEDURE — 90694 VACC AIIV4 NO PRSRV 0.5ML IM: CPT | Performed by: INTERNAL MEDICINE

## 2021-09-30 PROCEDURE — 3288F FALL RISK ASSESSMENT DOCD: CPT | Performed by: INTERNAL MEDICINE

## 2021-09-30 PROCEDURE — G0008 ADMIN INFLUENZA VIRUS VAC: HCPCS | Performed by: INTERNAL MEDICINE

## 2021-09-30 PROCEDURE — 99214 OFFICE O/P EST MOD 30 MIN: CPT | Performed by: INTERNAL MEDICINE

## 2021-09-30 RX ORDER — TRIAMTERENE AND HYDROCHLOROTHIAZIDE 37.5; 25 MG/1; MG/1
1 TABLET ORAL DAILY
Qty: 90 TABLET | Refills: 3 | Status: SHIPPED | OUTPATIENT
Start: 2021-09-30 | End: 2022-09-06 | Stop reason: SDUPTHER

## 2021-09-30 SDOH — ECONOMIC STABILITY: FOOD INSECURITY: WITHIN THE PAST 12 MONTHS, THE FOOD YOU BOUGHT JUST DIDN'T LAST AND YOU DIDN'T HAVE MONEY TO GET MORE.: NEVER TRUE

## 2021-09-30 SDOH — ECONOMIC STABILITY: FOOD INSECURITY: WITHIN THE PAST 12 MONTHS, YOU WORRIED THAT YOUR FOOD WOULD RUN OUT BEFORE YOU GOT MONEY TO BUY MORE.: NEVER TRUE

## 2021-09-30 ASSESSMENT — PATIENT HEALTH QUESTIONNAIRE - PHQ9
SUM OF ALL RESPONSES TO PHQ9 QUESTIONS 1 & 2: 2
SUM OF ALL RESPONSES TO PHQ QUESTIONS 1-9: 2
1. LITTLE INTEREST OR PLEASURE IN DOING THINGS: 1
SUM OF ALL RESPONSES TO PHQ QUESTIONS 1-9: 2
2. FEELING DOWN, DEPRESSED OR HOPELESS: 1
SUM OF ALL RESPONSES TO PHQ QUESTIONS 1-9: 2

## 2021-09-30 ASSESSMENT — ENCOUNTER SYMPTOMS
WHEEZING: 0
BACK PAIN: 1
SHORTNESS OF BREATH: 0

## 2021-09-30 ASSESSMENT — SOCIAL DETERMINANTS OF HEALTH (SDOH): HOW HARD IS IT FOR YOU TO PAY FOR THE VERY BASICS LIKE FOOD, HOUSING, MEDICAL CARE, AND HEATING?: NOT HARD AT ALL

## 2021-09-30 ASSESSMENT — LIFESTYLE VARIABLES: HOW OFTEN DO YOU HAVE A DRINK CONTAINING ALCOHOL: 0

## 2021-09-30 NOTE — PROGRESS NOTES
SUBJECTIVE:  Patient ID: Yelena Vanegas is an 73 y.o. female. HPI: Patient here today for the f/u of chronic problems-- see Problem List and associated comments. New issues or complaints include (also see Assessment for more details): Here for checkup. Stable. Status post Covid vaccine. Flu shot today. Review of Systems   Respiratory: Negative for shortness of breath and wheezing. Musculoskeletal: Positive for arthralgias, back pain and myalgias. Psychiatric/Behavioral: Negative for dysphoric mood and suicidal ideas. The patient is nervous/anxious. OBJECTIVE:    /70 (Site: Left Upper Arm)   Ht 5' 8\" (1.727 m)   Wt 183 lb (83 kg)   BMI 27.83 kg/m²      Physical Exam  Constitutional:       Appearance: She is not ill-appearing. Eyes:      Extraocular Movements: Extraocular movements intact. Cardiovascular:      Rate and Rhythm: Normal rate and regular rhythm. Pulmonary:      Effort: Pulmonary effort is normal. No respiratory distress. Breath sounds: Normal breath sounds. Abdominal:      General: Bowel sounds are normal.   Musculoskeletal:      Right lower leg: No edema. Left lower leg: No edema. Skin:     Findings: Erythema (Feet) present. Neurological:      General: No focal deficit present. Mental Status: She is oriented to person, place, and time. Psychiatric:         Mood and Affect: Mood normal.         Thought Content: Thought content normal.         Judgment: Judgment normal.         ASSESSMENT:       Encounter Diagnoses   Name Primary?  Need for influenza vaccination Yes    Depression with anxiety     Fibromyalgia     Essential hypertension     Erythromelalgia ([facility] [address]        Depression with anxiety    stable. Functional on current Rx. Continue lorazepam.  Monitor report done. Rx refilled last week. Fibromyalgia    she may be getting a pain of her rheumatologist soon. Changes season is causing issues.     Essential hypertension    BP okay-refill Maxide    Erythromelalgia ([facility] [address]    noted on exam-especially in feet. Past evaluation okay. Oxygenation and periphery okay. PLAN:See ASSESSMENT for evaluation & PLAN     Orders Placed This Encounter   Procedures    INFLUENZA, QUADV, ADJUVANTED, 72 YRS =, IM, PF, PREFILL SYR, 0.5ML (FLUAD)     , PMH, SH and FH reviewed and noted. Recent and past labs, tests and consultsalso reviewed. Recent or new meds also reviewed.

## 2021-10-13 ENCOUNTER — PATIENT MESSAGE (OUTPATIENT)
Dept: INTERNAL MEDICINE CLINIC | Age: 66
End: 2021-10-13

## 2021-10-13 ENCOUNTER — TELEPHONE (OUTPATIENT)
Dept: INTERNAL MEDICINE CLINIC | Age: 66
End: 2021-10-13

## 2021-10-13 RX ORDER — GLUCOSAMINE HCL/CHONDROITIN SU 500-400 MG
CAPSULE ORAL
Qty: 50 STRIP | Refills: 0 | Status: SHIPPED | OUTPATIENT
Start: 2021-10-13 | End: 2021-12-01

## 2021-10-13 RX ORDER — BLOOD-GLUCOSE METER
EACH MISCELLANEOUS
Qty: 1 KIT | Refills: 0 | Status: SHIPPED | OUTPATIENT
Start: 2021-10-13 | End: 2021-12-01

## 2021-10-13 NOTE — TELEPHONE ENCOUNTER
Test Results Question    Sebas Freitas MD 17 minutes ago (3:43 PM)   [address], The nurse practitioner from [facility] care came to see me on 10/8. She checked my A1C,  it was 5.9. Can you order me an accu-check unit so I can check my blood sugar throughout the day? I will drop my sugar intake and start riding my stationary bike everyday. I have been drinking quite a bit, usually 10 or so bottles of water per day, and I urinate all day and night. Usually up 2-4 times a day. Anything else, let me know. Thanks

## 2021-10-21 ENCOUNTER — PATIENT MESSAGE (OUTPATIENT)
Dept: INTERNAL MEDICINE CLINIC | Age: 66
End: 2021-10-21

## 2021-10-21 ENCOUNTER — TELEPHONE (OUTPATIENT)
Dept: INTERNAL MEDICINE CLINIC | Age: 66
End: 2021-10-21

## 2021-10-21 DIAGNOSIS — M79.7 FIBROMYALGIA: Primary | ICD-10-CM

## 2021-10-21 DIAGNOSIS — M25.50 ARTHRALGIA, UNSPECIFIED JOINT: ICD-10-CM

## 2021-10-21 NOTE — TELEPHONE ENCOUNTER
Visit Follow-Up Question    Deepika Castle MD 32 minutes ago (11:35 AM)   [address],  the  I talked to , to see the rheumatologist she wants you to order some blood tests. She said Dr Mojgan Rashid is booked, but she can tell by the results of the can get me in for evaluation. They are. ..RA. .. ANA MARIA. .. CCP. ..CRP...sed RATE. Can you do that?   Thanks, Kim

## 2021-10-27 DIAGNOSIS — M79.7 FIBROMYALGIA: ICD-10-CM

## 2021-10-27 DIAGNOSIS — M25.50 ARTHRALGIA, UNSPECIFIED JOINT: ICD-10-CM

## 2021-10-27 LAB
C-REACTIVE PROTEIN: 3.6 MG/L (ref 0–5.1)
RHEUMATOID FACTOR: <10 IU/ML
SEDIMENTATION RATE, ERYTHROCYTE: 9 MM/HR (ref 0–30)

## 2021-10-28 LAB
ANTI-NUCLEAR ANTIBODY (ANA): NEGATIVE
CYCLIC CITRULLINATED PEPTIDE ANTIBODY IGG: <0.5 U/ML (ref 0–2.9)

## 2021-10-28 RX ORDER — VENLAFAXINE HYDROCHLORIDE 75 MG/1
225 CAPSULE, EXTENDED RELEASE ORAL DAILY
Qty: 90 CAPSULE | Refills: 1 | Status: SHIPPED | OUTPATIENT
Start: 2021-10-28 | End: 2021-12-01 | Stop reason: SDUPTHER

## 2021-12-01 ENCOUNTER — OFFICE VISIT (OUTPATIENT)
Dept: FAMILY MEDICINE CLINIC | Age: 66
End: 2021-12-01
Payer: MEDICARE

## 2021-12-01 VITALS
HEIGHT: 68 IN | OXYGEN SATURATION: 97 % | DIASTOLIC BLOOD PRESSURE: 80 MMHG | BODY MASS INDEX: 26.98 KG/M2 | RESPIRATION RATE: 16 BRPM | SYSTOLIC BLOOD PRESSURE: 136 MMHG | HEART RATE: 84 BPM | WEIGHT: 178 LBS

## 2021-12-01 DIAGNOSIS — G89.29 OTHER CHRONIC BACK PAIN: ICD-10-CM

## 2021-12-01 DIAGNOSIS — R23.2 HOT FLASHES: ICD-10-CM

## 2021-12-01 DIAGNOSIS — R61 EXCESSIVE SWEATING: ICD-10-CM

## 2021-12-01 DIAGNOSIS — M47.816 FACET ARTHROPATHY, LUMBAR: ICD-10-CM

## 2021-12-01 DIAGNOSIS — F17.210 CIGARETTE SMOKER: ICD-10-CM

## 2021-12-01 DIAGNOSIS — F41.1 GAD (GENERALIZED ANXIETY DISORDER): ICD-10-CM

## 2021-12-01 DIAGNOSIS — Z76.89 ENCOUNTER TO ESTABLISH CARE: Primary | ICD-10-CM

## 2021-12-01 DIAGNOSIS — E78.2 MIXED HYPERLIPIDEMIA: ICD-10-CM

## 2021-12-01 DIAGNOSIS — R74.8 ELEVATED ALKALINE PHOSPHATASE LEVEL: ICD-10-CM

## 2021-12-01 DIAGNOSIS — I73.81 ERYTHROMELALGIA (HCC): ICD-10-CM

## 2021-12-01 DIAGNOSIS — R73.01 IMPAIRED FASTING GLUCOSE: ICD-10-CM

## 2021-12-01 DIAGNOSIS — Z79.899 HIGH RISK MEDICATION USE: ICD-10-CM

## 2021-12-01 DIAGNOSIS — Z02.89 MEDICATION MANAGEMENT CONTRACT AGREEMENT: ICD-10-CM

## 2021-12-01 DIAGNOSIS — J30.2 SEASONAL ALLERGIES: ICD-10-CM

## 2021-12-01 DIAGNOSIS — I10 ESSENTIAL HYPERTENSION: ICD-10-CM

## 2021-12-01 DIAGNOSIS — M79.7 FIBROMYALGIA: ICD-10-CM

## 2021-12-01 DIAGNOSIS — M54.9 OTHER CHRONIC BACK PAIN: ICD-10-CM

## 2021-12-01 DIAGNOSIS — F41.8 DEPRESSION WITH ANXIETY: ICD-10-CM

## 2021-12-01 LAB
A/G RATIO: 1.9 (ref 1.1–2.2)
ALBUMIN SERPL-MCNC: 4.9 G/DL (ref 3.4–5)
ALCOHOL URINE: NORMAL
ALP BLD-CCNC: 125 U/L (ref 40–129)
ALT SERPL-CCNC: 16 U/L (ref 10–40)
AMPHETAMINE SCREEN, URINE: NEGATIVE
ANION GAP SERPL CALCULATED.3IONS-SCNC: 12 MMOL/L (ref 3–16)
AST SERPL-CCNC: 19 U/L (ref 15–37)
BARBITURATE SCREEN, URINE: NEGATIVE
BENZODIAZEPINE SCREEN, URINE: POSITIVE
BILIRUB SERPL-MCNC: 0.3 MG/DL (ref 0–1)
BUN BLDV-MCNC: 11 MG/DL (ref 7–20)
BUPRENORPHINE URINE: NEGATIVE
CALCIUM SERPL-MCNC: 10 MG/DL (ref 8.3–10.6)
CHLORIDE BLD-SCNC: 89 MMOL/L (ref 99–110)
CHOLESTEROL, TOTAL: 267 MG/DL (ref 0–199)
CO2: 28 MMOL/L (ref 21–32)
COCAINE METABOLITE SCREEN URINE: NEGATIVE
CREAT SERPL-MCNC: 0.7 MG/DL (ref 0.6–1.2)
FENTANYL SCREEN, URINE: NORMAL
GABAPENTIN SCREEN, URINE: NORMAL
GFR AFRICAN AMERICAN: >60
GFR NON-AFRICAN AMERICAN: >60
GLUCOSE BLD-MCNC: 98 MG/DL (ref 70–99)
HDLC SERPL-MCNC: 76 MG/DL (ref 40–60)
LDL CHOLESTEROL CALCULATED: 144 MG/DL
MDMA URINE: NEGATIVE
METHADONE SCREEN, URINE: NEGATIVE
METHAMPHETAMINE, URINE: NEGATIVE
OPIATE SCREEN URINE: NEGATIVE
OXYCODONE SCREEN URINE: NEGATIVE
PHENCYCLIDINE SCREEN URINE: NEGATIVE
POTASSIUM SERPL-SCNC: 4.9 MMOL/L (ref 3.5–5.1)
PROPOXYPHENE SCREEN, URINE: NORMAL
SODIUM BLD-SCNC: 129 MMOL/L (ref 136–145)
SYNTHETIC CANNABINOIDS(K2) SCREEN, URINE: NORMAL
THC SCREEN, URINE: NEGATIVE
TOTAL PROTEIN: 7.5 G/DL (ref 6.4–8.2)
TRAMADOL SCREEN URINE: NORMAL
TRICYCLIC ANTIDEPRESSANTS, UR: NEGATIVE
TRIGL SERPL-MCNC: 237 MG/DL (ref 0–150)
VLDLC SERPL CALC-MCNC: 47 MG/DL

## 2021-12-01 PROCEDURE — 4040F PNEUMOC VAC/ADMIN/RCVD: CPT | Performed by: NURSE PRACTITIONER

## 2021-12-01 PROCEDURE — 1123F ACP DISCUSS/DSCN MKR DOCD: CPT | Performed by: NURSE PRACTITIONER

## 2021-12-01 PROCEDURE — 99215 OFFICE O/P EST HI 40 MIN: CPT | Performed by: NURSE PRACTITIONER

## 2021-12-01 PROCEDURE — G8399 PT W/DXA RESULTS DOCUMENT: HCPCS | Performed by: NURSE PRACTITIONER

## 2021-12-01 PROCEDURE — 4004F PT TOBACCO SCREEN RCVD TLK: CPT | Performed by: NURSE PRACTITIONER

## 2021-12-01 PROCEDURE — G8427 DOCREV CUR MEDS BY ELIG CLIN: HCPCS | Performed by: NURSE PRACTITIONER

## 2021-12-01 PROCEDURE — G8417 CALC BMI ABV UP PARAM F/U: HCPCS | Performed by: NURSE PRACTITIONER

## 2021-12-01 PROCEDURE — 1090F PRES/ABSN URINE INCON ASSESS: CPT | Performed by: NURSE PRACTITIONER

## 2021-12-01 PROCEDURE — 3017F COLORECTAL CA SCREEN DOC REV: CPT | Performed by: NURSE PRACTITIONER

## 2021-12-01 PROCEDURE — 80305 DRUG TEST PRSMV DIR OPT OBS: CPT | Performed by: NURSE PRACTITIONER

## 2021-12-01 PROCEDURE — G8484 FLU IMMUNIZE NO ADMIN: HCPCS | Performed by: NURSE PRACTITIONER

## 2021-12-01 PROCEDURE — 36415 COLL VENOUS BLD VENIPUNCTURE: CPT | Performed by: NURSE PRACTITIONER

## 2021-12-01 RX ORDER — FLUTICASONE PROPIONATE 50 MCG
SPRAY, SUSPENSION (ML) NASAL
Qty: 144 G | Refills: 0 | Status: SHIPPED | OUTPATIENT
Start: 2021-12-01 | End: 2022-10-03

## 2021-12-01 RX ORDER — LORAZEPAM 1 MG/1
TABLET ORAL
Qty: 150 TABLET | Refills: 0 | Status: SHIPPED | OUTPATIENT
Start: 2021-12-21 | End: 2022-01-21

## 2021-12-01 RX ORDER — ATENOLOL 50 MG/1
50 TABLET ORAL DAILY
Qty: 90 TABLET | Refills: 3 | Status: SHIPPED | OUTPATIENT
Start: 2021-12-01 | End: 2022-11-02

## 2021-12-01 RX ORDER — HYDROXYZINE HYDROCHLORIDE 25 MG/1
TABLET, FILM COATED ORAL
Qty: 40 TABLET | Refills: 5 | Status: SHIPPED | OUTPATIENT
Start: 2021-12-01 | End: 2022-06-09 | Stop reason: SDUPTHER

## 2021-12-01 RX ORDER — VENLAFAXINE HYDROCHLORIDE 75 MG/1
225 CAPSULE, EXTENDED RELEASE ORAL DAILY
Qty: 270 CAPSULE | Refills: 3 | Status: SHIPPED | OUTPATIENT
Start: 2021-12-01

## 2021-12-01 RX ORDER — ESTRADIOL 0.5 MG/1
0.5 TABLET ORAL DAILY
Qty: 90 TABLET | Refills: 3 | Status: SHIPPED | OUTPATIENT
Start: 2021-12-01 | End: 2022-05-20 | Stop reason: SINTOL

## 2021-12-01 RX ORDER — CLONIDINE HYDROCHLORIDE 0.1 MG/1
TABLET ORAL
Qty: 180 TABLET | Refills: 3 | Status: SHIPPED | OUTPATIENT
Start: 2021-12-01 | End: 2022-04-28 | Stop reason: SDUPTHER

## 2021-12-01 RX ORDER — TIZANIDINE 4 MG/1
TABLET ORAL
Qty: 270 TABLET | Refills: 3 | Status: SHIPPED | OUTPATIENT
Start: 2021-12-01

## 2021-12-01 RX ORDER — FLUTICASONE PROPIONATE 50 MCG
SPRAY, SUSPENSION (ML) NASAL
Qty: 48 G | Refills: 3 | Status: SHIPPED | OUTPATIENT
Start: 2021-12-01 | End: 2021-12-01

## 2021-12-01 ASSESSMENT — ENCOUNTER SYMPTOMS
DIARRHEA: 0
SHORTNESS OF BREATH: 0
SINUS PAIN: 0
CHEST TIGHTNESS: 0
NAUSEA: 0
BACK PAIN: 0
ABDOMINAL DISTENTION: 0
SINUS PRESSURE: 0
COUGH: 0
CONSTIPATION: 0
EYE DISCHARGE: 0
ABDOMINAL PAIN: 0
COLOR CHANGE: 0

## 2021-12-01 NOTE — PROGRESS NOTES
Date of Service:  2021    Varun Weaver (:  1955) is a 73 y.o. female, here for evaluation of the following medical concerns:    Chief Complaint   Patient presents with    New Patient     Inez SPRINGER. 16.         HPI     Patient here today to establish care. Previously a patient of Dr Lizzeth Dawn, he retired. Pt says she has a long history and is a \"walking disaster. \" Pt has extensive health history. Reviewed history and meds. On ativan, high doses. Willing to see therapist and psychiatrist. Has been on same meds for a long time. Does not tolerate others well. Review of Systems   Constitutional: Negative for activity change, appetite change, fatigue, fever and unexpected weight change. HENT: Negative for congestion, ear pain, sinus pressure and sinus pain. Eyes: Negative for discharge and visual disturbance. Respiratory: Negative for cough, chest tightness and shortness of breath. Cardiovascular: Negative for chest pain, palpitations and leg swelling. Gastrointestinal: Negative for abdominal distention, abdominal pain, constipation, diarrhea and nausea. Endocrine: Negative for cold intolerance, heat intolerance, polydipsia, polyphagia and polyuria. Genitourinary: Negative for decreased urine volume, difficulty urinating, dysuria, flank pain, frequency and urgency. Musculoskeletal: Negative for arthralgias, back pain, gait problem, joint swelling, myalgias and neck pain. Skin: Negative for color change, rash and wound. Allergic/Immunologic: Negative for food allergies and immunocompromised state. Neurological: Negative for dizziness, tremors, speech difficulty, weakness, light-headedness, numbness and headaches. Hematological: Negative for adenopathy. Does not bruise/bleed easily. Psychiatric/Behavioral: Negative for confusion, decreased concentration, self-injury, sleep disturbance and suicidal ideas. The patient is not nervous/anxious. Prior to Visit Medications    Medication Sig Taking? Authorizing Provider   estradiol (ESTRACE) 0.5 MG tablet Take 1 tablet by mouth daily Yes GRIS Bermudez CNP   atenolol (TENORMIN) 50 MG tablet Take 1 tablet by mouth daily Yes GRIS Bermudez CNP   cloNIDine (CATAPRES) 0.1 MG tablet TAKE 1 TABLET BY MOUTH TWICE DAILY Yes GRIS Bermudez CNP   hydrOXYzine (ATARAX) 25 MG tablet Take 1-2 tabs every 6 hours as needed itching Yes GRIS Bermudez CNP   fluticasone (FLONASE) 50 MCG/ACT nasal spray USE 2 SPRAYS IN EACH NOSTRIL EVERY DAY; SHAKE  WELL Yes GRIS Bermudez CNP   tiZANidine (ZANAFLEX) 4 MG tablet TAKE 1 TABLET BY MOUTH EVERY 8 HOURS AS NEEDED- muscle cramping and pain Yes GRIS Bermudez CNP   venlafaxine (EFFEXOR XR) 75 MG extended release capsule Take 3 capsules by mouth daily Yes GRIS Bermudez CNP   LORazepam (ATIVAN) 1 MG tablet TAKE 1 TABLET BY MOUTH THREE TIMES DAILY AS NEEDED DURING THE DAYTIME AND 2 TABLETS BY MOUTH EVERY NIGHT AT BEDTIME.  DO NOT FILL BEFORE 12/21 Yes GRIS Bermudez CNP   triamterene-hydroCHLOROthiazide (MAXZIDE-25) 37.5-25 MG per tablet Take 1 tablet by mouth daily Yes Joanna Buchanan MD   dicyclomine (BENTYL) 20 MG tablet Take 1 tablet every 6 hours as needed Yes Joanna Buchanan MD   omeprazole (PRILOSEC) 40 MG delayed release capsule TAKE 1 CAPSULE TWICE DAILY Yes Joanna Buchanan MD   ibuprofen (ADVIL;MOTRIN) 400 MG tablet TAKE 1 TABLET BY MOUTH EVERY 6 HOURS AS NEEDED FOR PAIN Yes Joanna Buchanan MD   albuterol sulfate  (90 Base) MCG/ACT inhaler INHALE 2 PUFFS INTO THE LUNGS EVERY 6 HOURS AS NEEDED FOR WHEEZING Yes Joanna Buchanan MD   melatonin 3 MG TABS tablet Take 6-9 mg by mouth nightly as needed (sleep)  Yes Historical Provider, MD   docusate sodium (COLACE) 100 MG capsule Take 100 mg by mouth 3 times daily as needed for Constipation Yes Historical Provider, MD        Allergies   Allergen Reactions    Macrodantin [Nitrofurantoin]      fever    Morphine      headache    Cipro Xr Nausea And Vomiting     If given iv, can not take it orally       Past Medical History:   Diagnosis Date    Anxiety state, unspecified     Chronic back pain     Tried spinal injections    Depressive disorder, not elsewhere classified     Duodenal ulcer, unspecified as acute or chronic, without hemorrhage, perforation, or obstruction     Essential hypertension 2/22/2016    Fibromyalgia     GERD (gastroesophageal reflux disease)     Seasonal allergies     Sleep disorder        Past Surgical History:   Procedure Laterality Date    ABDOMINAL ADHESION SURGERY      CARDIOVASCULAR STRESS TEST  02/2016    negative    CHOLECYSTECTOMY      COLONOSCOPY  9/13    negative    DENTAL SURGERY      HIATAL HERNIA REPAIR  09/07/2018    LAPAROSCOPIC HIATAL HERNIA, LAPAROSCOPIC NISSEN FUNDOPLICATION WITH MESH    HYSTERECTOMY      UT NJX DX/THER SBST INTRLMNR LMBR/SAC W/IMG GDN N/A 11/20/2018    LUMBAR L5-S1 EPIDURAL STEROID INJECTION WITH FLUOROSCOPY performed by Danyelle Shaffer MD at [address] [place] [address] ENDOSCOPY  9/13    small paraesoph hernia        Social History     Tobacco Use    Smoking status: Current Every Day Smoker     Packs/day: 1.00     Years: 40.00     Pack years: 40.00     Types: Cigarettes    Smokeless tobacco: Never Used   Vaping Use    Vaping Use: Former   Substance Use Topics    Alcohol use:  Yes     Alcohol/week: 1.0 standard drink     Types: 1 Glasses of wine per week     Comment: occasionaly    Drug use: No        Family History   Problem Relation Age of Onset    Heart Disease Mother     Other Mother     Stroke Father     Colon Cancer Father        Vitals:    12/01/21 1002   BP: 136/80   Site: Right Upper Arm   Position: Sitting   Cuff Size: Medium Adult   Pulse: 84   Resp: 16   SpO2: 97%   Weight: 178 lb (80.7 kg)   Height: 5' 8\" (1.727 m)     Estimated body mass index is 27.06 kg/m² as calculated from the following:    Height as of this encounter: 5' 8\" (1.727 m). Weight as of this encounter: 178 lb (80.7 kg). Physical Exam  Vitals reviewed. Constitutional:       General: She is awake. Appearance: Normal appearance. She is well-developed, well-groomed and overweight. She is not ill-appearing. HENT:      Head: Normocephalic and atraumatic. Right Ear: Hearing, tympanic membrane, ear canal and external ear normal.      Left Ear: Hearing, tympanic membrane, ear canal and external ear normal.      Nose: Nose normal.      Mouth/Throat:      Lips: Pink. Mouth: Mucous membranes are moist.      Pharynx: Oropharynx is clear. Eyes:      General: Lids are normal.      Extraocular Movements: Extraocular movements intact. Conjunctiva/sclera: Conjunctivae normal.      Pupils: Pupils are equal, round, and reactive to light. Neck:      Thyroid: No thyromegaly. Vascular: No carotid bruit. Cardiovascular:      Rate and Rhythm: Normal rate. Pulses:           Carotid pulses are 2+ on the right side and 2+ on the left side. Radial pulses are 2+ on the right side and 2+ on the left side. Posterior tibial pulses are 2+ on the right side and 2+ on the left side. Heart sounds: Normal heart sounds, S1 normal and S2 normal. No murmur heard. Pulmonary:      Effort: Pulmonary effort is normal.      Breath sounds: Normal breath sounds. Chest:   Breasts:      Right: No supraclavicular adenopathy. Left: No supraclavicular adenopathy. Abdominal:      General: Bowel sounds are normal. There is no abdominal bruit. Palpations: Abdomen is soft. Tenderness: There is no abdominal tenderness. Genitourinary:     Comments: Deferred  Musculoskeletal:         General: Normal range of motion. Right shoulder: Tenderness present.       Left shoulder: Tenderness present. Cervical back: Full passive range of motion without pain, normal range of motion and neck supple. Right lower leg: No edema. Left lower leg: No edema. Lymphadenopathy:      Head:      Right side of head: No submental, submandibular, tonsillar, preauricular, posterior auricular or occipital adenopathy. Left side of head: No submental, submandibular, tonsillar, preauricular, posterior auricular or occipital adenopathy. Cervical: No cervical adenopathy. Right cervical: No superficial, deep or posterior cervical adenopathy. Left cervical: No superficial, deep or posterior cervical adenopathy. Upper Body:      Right upper body: No supraclavicular adenopathy. Left upper body: No supraclavicular adenopathy. Skin:     General: Skin is warm and dry. Capillary Refill: Capillary refill takes less than 2 seconds. Neurological:      General: No focal deficit present. Mental Status: She is alert and oriented to person, place, and time. Mental status is at baseline. Sensory: Sensation is intact. Motor: Motor function is intact. Coordination: Coordination is intact. Gait: Gait is intact. Psychiatric:         Attention and Perception: Attention and perception normal.         Mood and Affect: Affect normal. Mood is anxious. Speech: Speech normal.         Behavior: Behavior normal. Behavior is cooperative. Thought Content: Thought content normal.         Cognition and Memory: Cognition and memory normal.         Judgment: Judgment normal.         ASSESSMENT/PLAN:  1. Encounter to establish care   Discussed office policies/practices/provider team   Reviewed medical, social, family history and medications   MyChart activation and usage discussed  2. Impaired fasting glucose  -     Hemoglobin A1C; Future   Checked by insurance per pt and was told it was elevated, no record of this A1C fasting glucose from insurance  3.  Mixed BY MOUTH TWICE DAILY, Disp-180 tablet, R-3Normal   BP well controlled today   Follow a low sodium diet  12. Cigarette smoker   Discussed lung cancer CT, not interested  13. Fibromyalgia  -     External Referral To Rheumatology   Pt requested referral  14. Other chronic back pain   [facility] pt request   Saw pain management in past- cannot wean off ativan, opioids not offered  15. Seasonal allergies  -     hydrOXYzine (ATARAX) 25 MG tablet; Take 1-2 tabs every 6 hours as needed itching, Disp-40 tablet, R-5Normal  -     fluticasone (FLONASE) 50 MCG/ACT nasal spray; USE 2 SPRAYS IN EACH NOSTRIL EVERY DAY; SHAKE  WELL, Disp-48 g, R-3Normal   Zyrtec PO daily  16. KEVIN (generalized anxiety disorder)  -     LORazepam (ATIVAN) 1 MG tablet; TAKE 1 TABLET BY MOUTH THREE TIMES DAILY AS NEEDED DURING THE DAYTIME AND 2 TABLETS BY MOUTH EVERY NIGHT AT BEDTIME. DO NOT FILL BEFORE 12/21, Disp-150 tablet, R-0Normal   Consider therapist, no plan for increase in ativan   Consider psychiatry if ativan not working or psych meds need adjustment as she has tried numerous ones over the years and did not tolerate well  17. Erythromelalgia [facility])  -     External Referral To Rheumatology    Pt request    Care Gaps Addressed  HIV screen not needed  Call insurance company to discuss coverage for shingles vaccine (Shingrix) 2 dose series   Low dose lung cancer CT screen declined  Mammo overdue- 2016  Overdue for labs  TDAP vaccine recommended- call insurance to discuss coverage  PNA vaccine recommended    I have reviewed patient's pertinent medical history, relevant laboratory and imaging studies, and past/future health maintenance. Discussed with the patient the importance of adhering to their current medication regimen as directed. Advised the patient that they should continue to work on eating a healthy balanced diet and staying active by exercising within their personal limits. Orders as listed above.  Patient was advised to keep future appointments with their respective specialty care team(s). Patient had the opportunity to ask questions, all of which were answered to the best of my ability and with patient satisfaction. Patient understands and is agreeable with the care plan following today's visit. Patient is to schedule an appointment for any new or worsening symptoms. Go to ER for significant shortness of breath, chest pain, or uncontrolled pain or fever. I discussed with patient the risk and benefits of any medications that were prescribed today. I verified that the patient understands their medications, labs, and/or procedures. The patient is doing well with current medication regimen and does not have any barriers to adherence. The patient's self-management abilities are good. Return in about 3 months (around 3/1/2022) for Anxiety Follow Up. An  electronic signature was used to authenticate this note.     --GRIS Parra - CNP on 12/1/2021 at 10:59 AM

## 2021-12-02 LAB
ESTIMATED AVERAGE GLUCOSE: 111.2 MG/DL
HBA1C MFR BLD: 5.5 %

## 2021-12-03 ENCOUNTER — PATIENT MESSAGE (OUTPATIENT)
Dept: FAMILY MEDICINE CLINIC | Age: 66
End: 2021-12-03

## 2021-12-03 DIAGNOSIS — E78.2 MIXED HYPERLIPIDEMIA: Primary | ICD-10-CM

## 2021-12-03 RX ORDER — EZETIMIBE 10 MG/1
10 TABLET ORAL DAILY
Qty: 90 TABLET | Refills: 3 | Status: SHIPPED | OUTPATIENT
Start: 2021-12-03 | End: 2022-09-27

## 2021-12-03 NOTE — TELEPHONE ENCOUNTER
From: Sera Weaver  To: Dorinda Das  Sent: 12/3/2021 11:54 AM EST  Subject: Reply to message from Dr Tadeo Lemon. Jeff Montes I haven't tried it, but I will. if you like. I am trying to eat better and smaller portions. I will try my best to ride my home bike every day, even if its only 10 minutes. Usually, I can do 20. My problem is boredom eating, I will also try to stop that. Sodium. .. I drink about, give or take a bottle,3500cc a day. I only drink water and occasionally a cup of tea. I will drink Gatorade if I feel nauseated or have worse balance problems. I drink slot because of my IBS-C. I started taking maxide 20 or so years ago(before I gained weight) because my hands, feet and face would swell up in the morning. I found out the maxide works best when I lie flat. Therefore, I am up every 2-3 hrs to pee. Don't sleep well! So, if I don't take it for a day, I will swell worse. Because of the erythromelalgia. Do you think there may be a better diuretic for me?  Thank you for the notes!! Kim

## 2021-12-03 NOTE — TELEPHONE ENCOUNTER
From: Sera Weaver  To: Gema Acosta  Sent: 12/3/2021 11:58 AM EST  Subject: Rheumatologist    Was I supposed to give the referral paper to the ? Am I supposed to call for appt. , or you all? Sorry, I forgot what I was told.  Thanks, Kim

## 2021-12-08 NOTE — PATIENT INSTRUCTIONS
Please call [address] [place] [address] to schedule your mammogram or call [phone] for mammogram [id number]      Patient Education        Anxiety Disorder: Care Instructions  Your Care Instructions     Anxiety is a normal reaction to stress. Difficult situations can cause you to have symptoms such as sweaty palms and a nervous feeling. In an anxiety disorder, the symptoms are far more severe. Constant worry, muscle tension, trouble sleeping, nausea and diarrhea, and other symptoms can make normal daily activities difficult or impossible. These symptoms may occur for no reason, and they can affect your work, school, or social life. Medicines, counseling, and self-care can all help. Follow-up care is a key part of your treatment and safety. Be sure to make and go to all appointments, and call your doctor if you are having problems. It's also a good idea to know your test results and keep a list of the medicines you take. How can you care for yourself at home? · Take medicines exactly as directed. Call your doctor if you think you are having a problem with your medicine. · Go to your counseling sessions and follow-up appointments. · Recognize and accept your anxiety. Then, when you are in a situation that makes you anxious, say to yourself, \"This is not an emergency. I feel uncomfortable, but I am not in danger. I can keep going even if I feel anxious. \"  · Be kind to your body:  ? Relieve tension with exercise or a massage. ? Get enough rest.  ? Avoid alcohol, caffeine, nicotine, and illegal drugs. They can increase your anxiety level and cause sleep problems. ? Learn and do relaxation techniques. See below for more about these techniques. · Engage your mind. Get out and do something you enjoy. Go to a funny movie, or take a walk or hike. Plan your day. Having too much or too little to do can make you anxious. · Keep a record of your symptoms.  Discuss your fears with a good friend or family member, or join a support group for people with similar problems. Talking to others sometimes relieves stress. · Get involved in social groups, or volunteer to help others. Being alone sometimes makes things seem worse than they are. · Get at least 30 minutes of exercise on most days of the week to relieve stress. Walking is a good choice. You also may want to do other activities, such as running, swimming, cycling, or playing tennis or team sports. Relaxation techniques  Do relaxation exercises 10 to 20 minutes a day. You can play soothing, relaxing music while you do them, if you wish. · Tell others in your house that you are going to do your relaxation exercises. Ask them not to disturb you. · Find a comfortable place, away from all distractions and noise. · Lie down on your back, or sit with your back straight. · Focus on your breathing. Make it slow and steady. · Breathe in through your nose. Breathe out through either your nose or mouth. · Breathe deeply, filling up the area between your navel and your rib cage. Breathe so that your belly goes up and down. · Do not hold your breath. · Breathe like this for 5 to 10 minutes. Notice the feeling of calmness throughout your whole body. As you continue to breathe slowly and deeply, relax by doing the following for another 5 to 10 minutes:  · Tighten and relax each muscle group in your body. You can begin at your toes and work your way up to your head. · Imagine your muscle groups relaxing and becoming heavy. · Empty your mind of all thoughts. · Let yourself relax more and more deeply. · Become aware of the state of calmness that surrounds you. · When your relaxation time is over, you can bring yourself back to alertness by moving your fingers and toes and then your hands and feet and then stretching and moving your entire body. Sometimes people fall asleep during relaxation, but they usually wake up shortly afterward.   · Always give yourself time to return to full alertness before you drive a car or do anything that might cause an accident if you are not fully alert. Never play a relaxation tape while you drive a car. When should you call for help? Call 911 anytime you think you may need emergency care. For example, call if:    · You feel you cannot stop from hurting yourself or someone else. Keep the numbers for these national suicide hotlines: 3-636-769-TALK (6-295.678.1522) and 2-828-LSPQSOI (4-785.529.6184). If you or someone you know talks about suicide or feeling hopeless, get help right away. Watch closely for changes in your health, and be sure to contact your doctor if:    · You have anxiety or fear that affects your life.     · You have symptoms of anxiety that are new or different from those you had before. Where can you learn more? Go to https://Corefinopepiceweb.[employer]. org and sign in to your [employer] account. Enter P754 in the [employer] box to learn more about \"Anxiety Disorder: Care Instructions. \"     If you do not have an account, please click on the \"Sign Up Now\" link. Current as of: September 23, 2020               Content Version: 12.9  © 2006-2021 Healthwise, Incorporated. Care instructions adapted under license by [facility] ([facility]). If you have questions about a medical condition or this instruction, always ask your healthcare professional. [place] [address] any warranty or liability for your use of this information. Subsequent Stages Histo Method Verbiage: Using a similar technique to that described above, a thin layer of tissue was removed from all areas where tumor was visible on the previous stage.  The tissue was again oriented, mapped, dyed, and processed as above.

## 2021-12-09 ENCOUNTER — PATIENT MESSAGE (OUTPATIENT)
Dept: FAMILY MEDICINE CLINIC | Age: 66
End: 2021-12-09

## 2021-12-09 NOTE — TELEPHONE ENCOUNTER
From: Sera Weaver  To: Kade Noble  Sent: 12/9/2021 11:19 AM EST  Subject: Pain    The pain Dr I saw was Dr Brenda Pierce, on [facility]. I haven't seen him in a few years. He gave me diclofenac cream. I've seen there is a patch. Can I try that for my sacrum?  Thanks, Kim

## 2022-01-03 ENCOUNTER — PATIENT MESSAGE (OUTPATIENT)
Dept: FAMILY MEDICINE CLINIC | Age: 67
End: 2022-01-03

## 2022-01-03 DIAGNOSIS — K08.89 TOOTH PAIN: Primary | ICD-10-CM

## 2022-01-03 RX ORDER — AMOXICILLIN 500 MG/1
500 CAPSULE ORAL 2 TIMES DAILY
Qty: 14 CAPSULE | Refills: 0 | Status: SHIPPED | OUTPATIENT
Start: 2022-01-03 | End: 2022-06-07 | Stop reason: SDUPTHER

## 2022-01-03 NOTE — TELEPHONE ENCOUNTER
From: Sera Weaver  To: Karie Cunningham  Sent: 1/3/2022 11:03 AM EST  Subject: Abcessed tooth    Right lower molar is abcessed. Tried to call dentist, not back till tomorrow. It hurts, gland swollen under jaw. No fever. Can you start me on an antibiotic? I will see him tomorrow sometime.  Thanks, Kim

## 2022-01-21 ENCOUNTER — PATIENT MESSAGE (OUTPATIENT)
Dept: FAMILY MEDICINE CLINIC | Age: 67
End: 2022-01-21

## 2022-01-31 ENCOUNTER — PATIENT MESSAGE (OUTPATIENT)
Dept: FAMILY MEDICINE CLINIC | Age: 67
End: 2022-01-31

## 2022-01-31 DIAGNOSIS — R10.9 ABDOMINAL CRAMPING: Primary | ICD-10-CM

## 2022-02-01 NOTE — TELEPHONE ENCOUNTER
From: Jonathan Álvarez  To: Rush Michael Weaver  Sent: 1/31/2022 2:52 PM EST  Subject: Wilbert Leventhal,     I received a notification from Medicare that other medications need to be tried for your anxiety and that ativan will not be covered on your current plan, and the amount is limited in the first 90 days of the calendar year to no more than 4 pills per day. Currently you are prescribed 5 pills per day which is a large amount, I believe your  is as well. Have you ever seen a psychiatrist?     From our last discussion this is what I had documented-  \"Pt says she has a long history and is a \"walking disaster. \" Pt has extensive health history. Reviewed history and meds. On ativan, high doses. Willing to see therapist and psychiatrist. Has been on same meds for a long time. Does not tolerate others well. \"    I would recommend calling your insurance company and requesting a list on mental health providers, and I can send you the community list we have as well. Perhaps Medicare will better accept your ativan coming from a mental health professional, as you are on very high doses, it is strongly recommended to reduce and then wean off the medication, and people do develop a tolerance to the medication and there is not room to increase your dose from your current script. Please look at the list of mental health services, as well as call your insurance. The letter looks as though it should have been sent to you as well to \"act on it before a lapse in your medications occurs. \"    Manuel Elkins, APRN - CNP

## 2022-02-08 RX ORDER — DICYCLOMINE HCL 20 MG
TABLET ORAL
Qty: 120 TABLET | Refills: 1 | Status: SHIPPED | OUTPATIENT
Start: 2022-02-08 | End: 2022-04-15

## 2022-02-09 ENCOUNTER — PATIENT MESSAGE (OUTPATIENT)
Dept: FAMILY MEDICINE CLINIC | Age: 67
End: 2022-02-09

## 2022-02-16 RX ORDER — IBUPROFEN 400 MG/1
TABLET ORAL
Qty: 360 TABLET | Refills: 3 | Status: SHIPPED | OUTPATIENT
Start: 2022-02-16

## 2022-02-18 ENCOUNTER — PATIENT MESSAGE (OUTPATIENT)
Dept: FAMILY MEDICINE CLINIC | Age: 67
End: 2022-02-18

## 2022-02-18 DIAGNOSIS — F41.1 GAD (GENERALIZED ANXIETY DISORDER): ICD-10-CM

## 2022-02-18 RX ORDER — LORAZEPAM 1 MG/1
TABLET ORAL
Qty: 150 TABLET | Refills: 0 | Status: SHIPPED | OUTPATIENT
Start: 2022-02-20 | End: 2022-02-23 | Stop reason: SDUPTHER

## 2022-02-18 NOTE — TELEPHONE ENCOUNTER
From: Sera Weaver  To: Arvid Credit  Sent: 2/18/2022 2:45 PM EST  Subject: Psyche Dr Rivers, Dr Julio C Puri apparently retired. Nuts. I looked and found Dr Francisca Moreau is on my plan, and taking patients. He will do. I will call for appt. Also time to fill my Ativan, if you would, one more time. I will let you know when my appt. Is with him. My attitude is somewhat adjusted, feeling a little better. Been doing bike for 15-20 per day,except yesterday. Weather.  Thanks, Kim

## 2022-02-19 ENCOUNTER — PATIENT MESSAGE (OUTPATIENT)
Dept: FAMILY MEDICINE CLINIC | Age: 67
End: 2022-02-19

## 2022-02-19 DIAGNOSIS — F41.1 GAD (GENERALIZED ANXIETY DISORDER): Primary | ICD-10-CM

## 2022-02-21 NOTE — TELEPHONE ENCOUNTER
From: Sera Weaver  To: Kathie Lindsey  Sent: 2/19/2022 2:17 PM EST  Subject: Ativan    Hi, thanks for ordering the ativan. If you would, please, send back to [facility] the letter they sent for reason for ativan. Dr Gilbert Calles told me he would say that it is the only thing that works. They want me to pay for script 105.00. Hopefully, after I see Dr Renae Eller, he can find something else.  Thanks, Kim

## 2022-02-23 ENCOUNTER — TELEPHONE (OUTPATIENT)
Dept: ADMINISTRATIVE | Age: 67
End: 2022-02-23

## 2022-02-23 RX ORDER — LORAZEPAM 1 MG/1
TABLET ORAL
Qty: 120 TABLET | Refills: 0 | Status: SHIPPED | OUTPATIENT
Start: 2022-02-23 | End: 2022-03-28

## 2022-02-23 NOTE — TELEPHONE ENCOUNTER
Submitted PA for LORazepam 1MG tablets  Via CM Key: [id number] . Per plan \"This medication or product is on your plan's list of covered drugs. Prior authorization is not required at this time. \"    If this requires a response please respond to the pool. [address])    Please advise patient. Thank you.

## 2022-03-01 ENCOUNTER — PATIENT MESSAGE (OUTPATIENT)
Dept: FAMILY MEDICINE CLINIC | Age: 67
End: 2022-03-01

## 2022-03-01 ENCOUNTER — OFFICE VISIT (OUTPATIENT)
Dept: FAMILY MEDICINE CLINIC | Age: 67
End: 2022-03-01
Payer: MEDICARE

## 2022-03-01 VITALS
TEMPERATURE: 97.2 F | HEART RATE: 70 BPM | HEIGHT: 68 IN | DIASTOLIC BLOOD PRESSURE: 80 MMHG | BODY MASS INDEX: 27.92 KG/M2 | WEIGHT: 184.2 LBS | SYSTOLIC BLOOD PRESSURE: 138 MMHG | OXYGEN SATURATION: 97 % | RESPIRATION RATE: 18 BRPM

## 2022-03-01 DIAGNOSIS — Z79.899 MEDICAL MARIJUANA USE: ICD-10-CM

## 2022-03-01 DIAGNOSIS — Z71.6 ENCOUNTER FOR TOBACCO USE CESSATION COUNSELING: ICD-10-CM

## 2022-03-01 DIAGNOSIS — F41.1 GAD (GENERALIZED ANXIETY DISORDER): ICD-10-CM

## 2022-03-01 DIAGNOSIS — Z72.0 TOBACCO ABUSE: ICD-10-CM

## 2022-03-01 DIAGNOSIS — Z71.89 ACP (ADVANCE CARE PLANNING): ICD-10-CM

## 2022-03-01 DIAGNOSIS — Z12.11 SCREENING FOR MALIGNANT NEOPLASM OF COLON: ICD-10-CM

## 2022-03-01 DIAGNOSIS — Z00.00 MEDICARE ANNUAL WELLNESS VISIT, SUBSEQUENT: Primary | ICD-10-CM

## 2022-03-01 DIAGNOSIS — Z12.31 ENCOUNTER FOR SCREENING MAMMOGRAM FOR MALIGNANT NEOPLASM OF BREAST: ICD-10-CM

## 2022-03-01 DIAGNOSIS — Z23 PNEUMOCOCCAL VACCINE ADMINISTERED: ICD-10-CM

## 2022-03-01 PROCEDURE — G8484 FLU IMMUNIZE NO ADMIN: HCPCS | Performed by: NURSE PRACTITIONER

## 2022-03-01 PROCEDURE — 3017F COLORECTAL CA SCREEN DOC REV: CPT | Performed by: NURSE PRACTITIONER

## 2022-03-01 PROCEDURE — 1123F ACP DISCUSS/DSCN MKR DOCD: CPT | Performed by: NURSE PRACTITIONER

## 2022-03-01 PROCEDURE — 4040F PNEUMOC VAC/ADMIN/RCVD: CPT | Performed by: NURSE PRACTITIONER

## 2022-03-01 PROCEDURE — 99497 ADVNCD CARE PLAN 30 MIN: CPT | Performed by: NURSE PRACTITIONER

## 2022-03-01 PROCEDURE — G0439 PPPS, SUBSEQ VISIT: HCPCS | Performed by: NURSE PRACTITIONER

## 2022-03-01 RX ORDER — BUSPIRONE HYDROCHLORIDE 5 MG/1
5 TABLET ORAL 2 TIMES DAILY
Qty: 180 TABLET | Refills: 0 | Status: SHIPPED | OUTPATIENT
Start: 2022-03-01 | End: 2022-04-20 | Stop reason: SDUPTHER

## 2022-03-01 ASSESSMENT — PATIENT HEALTH QUESTIONNAIRE - PHQ9
SUM OF ALL RESPONSES TO PHQ QUESTIONS 1-9: 8
SUM OF ALL RESPONSES TO PHQ QUESTIONS 1-9: 8
2. FEELING DOWN, DEPRESSED OR HOPELESS: 0
SUM OF ALL RESPONSES TO PHQ QUESTIONS 1-9: 8
3. TROUBLE FALLING OR STAYING ASLEEP: 3
6. FEELING BAD ABOUT YOURSELF - OR THAT YOU ARE A FAILURE OR HAVE LET YOURSELF OR YOUR FAMILY DOWN: 0
5. POOR APPETITE OR OVEREATING: 0
4. FEELING TIRED OR HAVING LITTLE ENERGY: 2
10. IF YOU CHECKED OFF ANY PROBLEMS, HOW DIFFICULT HAVE THESE PROBLEMS MADE IT FOR YOU TO DO YOUR WORK, TAKE CARE OF THINGS AT HOME, OR GET ALONG WITH OTHER PEOPLE: 1
SUM OF ALL RESPONSES TO PHQ9 QUESTIONS 1 & 2: 2
8. MOVING OR SPEAKING SO SLOWLY THAT OTHER PEOPLE COULD HAVE NOTICED. OR THE OPPOSITE, BEING SO FIGETY OR RESTLESS THAT YOU HAVE BEEN MOVING AROUND A LOT MORE THAN USUAL: 0
7. TROUBLE CONCENTRATING ON THINGS, SUCH AS READING THE NEWSPAPER OR WATCHING TELEVISION: 1
SUM OF ALL RESPONSES TO PHQ QUESTIONS 1-9: 8
1. LITTLE INTEREST OR PLEASURE IN DOING THINGS: 2
9. THOUGHTS THAT YOU WOULD BE BETTER OFF DEAD, OR OF HURTING YOURSELF: 0

## 2022-03-01 ASSESSMENT — ENCOUNTER SYMPTOMS
COUGH: 0
ABDOMINAL DISTENTION: 0
COLOR CHANGE: 0
EYE DISCHARGE: 0
SINUS PRESSURE: 0
ABDOMINAL PAIN: 0
SINUS PAIN: 0
CHEST TIGHTNESS: 0
CONSTIPATION: 0
SHORTNESS OF BREATH: 0
NAUSEA: 0
DIARRHEA: 0
BACK PAIN: 1

## 2022-03-01 ASSESSMENT — LIFESTYLE VARIABLES
HOW OFTEN DO YOU HAVE A DRINK CONTAINING ALCOHOL: MONTHLY OR LESS
HOW MANY STANDARD DRINKS CONTAINING ALCOHOL DO YOU HAVE ON A TYPICAL DAY: 1 OR 2

## 2022-03-01 NOTE — PROGRESS NOTES
Medicare Annual Wellness Visit    1979 Carolina Shi is here for Medicare AWV (MEDICARE ANNUAL WELLNESS VISIT ) and Anxiety (FOLLOW UP ON ANXIETY)    Assessment & Plan   Queenie Orellana was seen today for medicare awv and anxiety. Diagnoses and all orders for this visit:    Medicare annual wellness visit, subsequent   AWV today  ACP (advance care planning)  -     [place] [facility] PLAN FACE TO [address], 1ST 30MIN [id number]   FULL code, no long term life support   Best friend Gabriele Fields to be HCPOA - encouraged living will paperwork be completed since she is not next of kin    alternate POA   Living will paperwork given to pt today  Tobacco abuse  -     [place] Lung Screen (Initial or Annual); Future   Pt would like screening for lung cancer, has some wheezing but no other concerning symptoms   Working on quitting but anxiety and pain play a big role in her continuing to smoke  Encounter for tobacco use cessation counseling  -     CT Lung Screen (Initial or Annual); Future   Information printed and reviewed  Pneumococcal vaccine administered   Forgot to give PNA vaccine at visit today, will give at next visit or schedule lab visit   Sent [employer] message to pt   Information printed and reviewed   Due for PNA23 vaccine  Screening for malignant neoplasm of colon  -     AFL - Opal Vaz MD, Gastroenterology, [facility] 2018, fair bowel prep, recommended 3 year follow up   Information printed and reviewed   Pt agreeable to referral, did not want to see Dr Jacklyn Liao again  Encounter for screening mammogram for malignant neoplasm of breast  -     ROSELIA DIGITAL SCREEN W OR WO CAD BILATERAL;  Future   Please call [phone] to schedule your mammogram or [phone] for mammogram Marcio Estrada   Discussed with pt, option for mammogram Marcio Estrada here at our office a few times this year  Medical marijuana use   Made copy of pt card today, valid through Sept 2022   Rare use to help with sleep  KEVIN (generalized anxiety disorder)  -     busPIRone provided - poor balance and has worsened with age, encouraged strengthening exercises, exercises on bike when she feels up to it- last week was a rough week     Depression:  PHQ-2 Score: 2  PHQ-9 Total Score: 8    Severity:1-4 = minimal depression, 5-9 = mild depression, 10-14 = moderate depression, 15-19 = moderately severe depression, 20-27 = severe depression      Tobacco Use:     Tobacco Use: High Risk    Smoking Tobacco Use: Current Every Day Smoker    Smokeless Tobacco Use: Never Used     E-Cigarettes/Vaping Use     Questions Responses    E-Cigarette/Vaping Use Former User    Start Date     Passive Exposure     Quit Date     Counseling Given     Comments         Substance Abuse - Tobacco Interventions:  tobacco cessation tips and resources provided           General Health and ACP:  General  In general, how would you say your health is?: Good  In the past 7 days, have you experienced any of the following: New or Increased Pain, New or Increased Fatigue, Loneliness, Social Isolation, Stress or Anger?: (!) Yes  Select all that apply: (!) New or Increased Pain,New or Increased Fatigue,Loneliness,Social Isolation,Stress,Anger  Do you get the social and emotional support that you need?: Yes  Do you have a Living Will?: (!) No    Advance Directives     Power of  Living Will ACP-Advance Directive ACP-Power of     Not on File Filed on 11/02/17 Filed Not on File      General Health Risk Interventions:  · Pain issues: home exercises provided, following with rheumatology  · Fatigue: regular exercise recommended- 3-5 times per week, 30-45 minutes per session, follows with rheumatology  · Loneliness: patient's comments regarding inadequate social support:  has aspergers  · Social isolation: patient's comments regarding inadequate social support: COVID and  aspbergers  · Stress: patient's comments regarding reasons for stress and/or anger: frustrated with overall health, relaxation techniques discussed  · Anger: regular exercise recommended- 3-5 times per week, 30-45 minutes per session, frustrated and angry in so much pain, pain mgmt in past said they won't prescribe if she on ativan- pt uses medical marijuana instead  · No Living Will: Advance Care Planning addressed with patient today and given paperwork today and discussed    Health Habits/Nutrition:     Physical Activity: Insufficiently Active    Days of Exercise per Week: 3 days    Minutes of Exercise per Session: 20 min     Have you lost any weight without trying in the past 3 months?: No  Body mass index: (!) 28  Have you seen the dentist within the past year?: (!) No    Health Habits/Nutrition Interventions:  · Dental exam overdue:  patient encouraged to make appointment with his/her dentist    Hearing/Vision:  Do you or your family notice any trouble with your hearing that hasn't been managed with hearing aids?: No  Do you have difficulty driving, watching TV, or doing any of your daily activities because of your eyesight?: No  Have you had an eye exam within the past year?: (!) No  No exam data present    Hearing/Vision Interventions:  · Vision concerns:  patient encouraged to make appointment with his/her eye specialist    Safety:  Do you have working smoke detectors?: Yes  Do you have any tripping hazards - loose or unsecured carpets or rugs?: No  Do you have any tripping hazards - clutter in doorways, halls, or stairs?: No  Do you have either shower bars, grab bars, non-slip mats or non-slip surfaces in your shower or bathtub?: (!) No  Do all of your stairways have a railing or banister?: Yes  Do you always fasten your seatbelt when you are in a car?: Yes    Safety Interventions:  · Home safety tips provided           Objective   Vitals:    03/01/22 1127   BP: 138/80   Site: Right Upper Arm   Position: Sitting   Cuff Size: Medium Adult   Pulse: 70   Resp: 18   Temp: 97.2 °F (36.2 °C)   TempSrc: Infrared   SpO2: 97%   Weight: 184 lb 3.2 oz (83.6 kg)   Height: 5' 8\" (1.727 m)      Body mass index is 28.01 kg/m². Review of Systems   Constitutional: Negative for activity change, appetite change, fatigue, fever and unexpected weight change. HENT: Negative for congestion, ear pain, sinus pressure and sinus pain. Eyes: Negative for discharge and visual disturbance. Respiratory: Negative for cough, chest tightness and shortness of breath. Cardiovascular: Negative for chest pain, palpitations and leg swelling. Gastrointestinal: Negative for abdominal distention, abdominal pain, constipation, diarrhea and nausea. Endocrine: Negative for cold intolerance, heat intolerance, polydipsia, polyphagia and polyuria. Genitourinary: Negative for decreased urine volume, difficulty urinating, dysuria, flank pain, frequency and urgency. Musculoskeletal: Positive for arthralgias, back pain and myalgias. Negative for gait problem, joint swelling and neck pain. Skin: Negative for color change, rash and wound. Allergic/Immunologic: Negative for food allergies and immunocompromised state. Neurological: Positive for weakness. Negative for dizziness, tremors, speech difficulty, light-headedness, numbness and headaches. Hematological: Negative for adenopathy. Does not bruise/bleed easily. Psychiatric/Behavioral: Positive for sleep disturbance. Negative for confusion, decreased concentration, self-injury and suicidal ideas. The patient is nervous/anxious. Physical Exam  Vitals reviewed. Constitutional:       General: She is awake. Appearance: Normal appearance. She is well-developed, well-groomed and overweight. She is not ill-appearing. HENT:      Head: Normocephalic and atraumatic. Right Ear: Hearing, tympanic membrane, ear canal and external ear normal.      Left Ear: Hearing, tympanic membrane, ear canal and external ear normal.      Nose: Nose normal.      Mouth/Throat:      Lips: Pink.       Mouth: Mucous membranes are moist.      Pharynx: Oropharynx is clear. Eyes:      General: Lids are normal.      Extraocular Movements: Extraocular movements intact. Conjunctiva/sclera: Conjunctivae normal.      Pupils: Pupils are equal, round, and reactive to light. Neck:      Thyroid: No thyromegaly. Vascular: No carotid bruit. Cardiovascular:      Rate and Rhythm: Normal rate. Pulses:           Carotid pulses are 2+ on the right side and 2+ on the left side. Radial pulses are 2+ on the right side and 2+ on the left side. Posterior tibial pulses are 2+ on the right side and 2+ on the left side. Heart sounds: Normal heart sounds, S1 normal and S2 normal. No murmur heard. Pulmonary:      Effort: Pulmonary effort is normal.      Breath sounds: Normal breath sounds. Chest:   Breasts:      Right: No supraclavicular adenopathy. Left: No supraclavicular adenopathy. Abdominal:      General: Bowel sounds are normal. There is no abdominal bruit. Palpations: Abdomen is soft. Tenderness: There is no abdominal tenderness. Genitourinary:     Comments: Deferred  Musculoskeletal:         General: Normal range of motion. Cervical back: Full passive range of motion without pain, normal range of motion and neck supple. Right lower leg: No edema. Left lower leg: No edema. Lymphadenopathy:      Head:      Right side of head: No submental, submandibular, tonsillar, preauricular, posterior auricular or occipital adenopathy. Left side of head: No submental, submandibular, tonsillar, preauricular, posterior auricular or occipital adenopathy. Cervical: No cervical adenopathy. Right cervical: No superficial, deep or posterior cervical adenopathy. Left cervical: No superficial, deep or posterior cervical adenopathy. Upper Body:      Right upper body: No supraclavicular adenopathy. Left upper body: No supraclavicular adenopathy.    Skin:     General: Skin is warm and dry. Capillary Refill: Capillary refill takes less than 2 seconds. Neurological:      General: No focal deficit present. Mental Status: She is alert and oriented to person, place, and time. Mental status is at baseline. Sensory: Sensation is intact. Motor: Motor function is intact. Coordination: Coordination is intact. Gait: Gait is intact. Psychiatric:         Attention and Perception: Attention and perception normal.         Mood and Affect: Mood and affect normal.         Speech: Speech normal.         Behavior: Behavior normal. Behavior is cooperative. Thought Content: Thought content normal.         Cognition and Memory: Cognition and memory normal.         Judgment: Judgment normal.             Allergies   Allergen Reactions    Macrodantin [Nitrofurantoin]      fever    Morphine      headache    Cipro Xr Nausea And Vomiting     If given iv, can not take it orally     Prior to Visit Medications    Medication Sig Taking?  Authorizing Provider   Alpha-Lipoic Acid 600 MG TABS Take 1 tablet by mouth in the morning and at bedtime Yes Historical Provider, MD   busPIRone (BUSPAR) 5 MG tablet Take 1 tablet by mouth 2 times daily Yes GRIS Roque CNP   LORazepam (ATIVAN) 1 MG tablet TAKE 1 TABLET BY MOUTH TWO TO THREE TIMES DAILY AS NEEDED DURING THE DAY AND 2 TABLETS EVERY NIGHT AT BEDTIME FOR A MAXIMUM OF 4 TABLETS DAILY Yes GRIS Roque CNP   ibuprofen (ADVIL;MOTRIN) 400 MG tablet TAKE 1 TABLET BY MOUTH EVERY 6 HOURS AS NEEDED FOR PAIN Yes Oralia Cabrera MD   dicyclomine (BENTYL) 20 MG tablet Take 1 tablet every 6 hours as needed Yes GRIS Roque CNP   ezetimibe (ZETIA) 10 MG tablet Take 1 tablet by mouth daily Yes GRIS Roque CNP   estradiol (ESTRACE) 0.5 MG tablet Take 1 tablet by mouth daily Yes GRIS Roque CNP   atenolol (TENORMIN) 50 MG tablet Take 1 tablet by mouth daily Yes GRIS Dong CNP   cloNIDine (CATAPRES) 0.1 MG tablet TAKE 1 TABLET BY MOUTH TWICE DAILY Yes GRIS Dong CNP   hydrOXYzine (ATARAX) 25 MG tablet Take 1-2 tabs every 6 hours as needed itching Yes GRIS Dong CNP   tiZANidine (ZANAFLEX) 4 MG tablet TAKE 1 TABLET BY MOUTH EVERY 8 HOURS AS NEEDED- muscle cramping and pain Yes GRIS Dong CNP   venlafaxine (EFFEXOR XR) 75 MG extended release capsule Take 3 capsules by mouth daily Yes GRIS Dong CNP   fluticasone (FLONASE) 50 MCG/ACT nasal spray USE 2 SPRAYS IN EACH NOSTRIL EVERY DAY, SHAKE WELL Yes GRIS Dong CNP   triamterene-hydroCHLOROthiazide (MAXZIDE-25) 37.5-25 MG per tablet Take 1 tablet by mouth daily Yes Nolvia Cabrales MD   omeprazole (PRILOSEC) 40 MG delayed release capsule TAKE 1 CAPSULE TWICE DAILY Yes Nolvia Cabrales MD   albuterol sulfate  (90 Base) MCG/ACT inhaler INHALE 2 PUFFS INTO THE LUNGS EVERY 6 HOURS AS NEEDED FOR WHEEZING Yes Nolvia Cabrales MD   melatonin 3 MG TABS tablet Take 6-9 mg by mouth nightly as needed (sleep)  Yes Historical Provider, MD   docusate sodium (COLACE) 100 MG capsule Take 100 mg by mouth 3 times daily as needed for Constipation Yes Historical Provider, MD [place] (Including outside providers/suppliers regularly involved in providing care):   Patient Care Team:  GRIS Dong CNP as PCP - General (Certified Nurse Practitioner)  GRIS Dong CNP as PCP - [facility]  Blanche Basurto MD as Surgeon (General Surgery)  Jignesh Fernandez MD as Surgeon (Gastroenterology)    Reviewed and updated this visit:  Tobacco  Allergies  Meds  Problems  Med Hx  Surg Hx  Soc Hx  Fam Hx                  Advance Care Planning   Advanced Care Planning: Discussed the patients choices for care and treatment in case of a health event that adversely affects decision-making abilities. Also discussed the patients long-term treatment options. Reviewed with the patient the appropriate state-specific advance directive documents. Reviewed the process of designating a competent adult as an Agent (or -in-fact) that could take make health care decisions for the patient if incompetent. Patient was asked to complete the declaration forms, either acknowledge the forms by a public notary or an eligible witness and provide a signed copy to the practice office. Time spent (minutes): 10  Pt wants to remain a FULL code; pt does not want long term lifesupport  FOREST- Susie Castano (best friend), encouraged living will to be completed since she is not next of kin  Alternates- [facility] [place]-     Cardiovascular Disease Risk Counseling: Assessed the patient's risk to develop cardiovascular disease and reviewed main risk factors. Reviewed steps to reduce disease risk including:   · Quitting tobacco use, reducing amount smoked, or not starting the habit  · Making healthy food choices  · Being physically active and gradualy increasing activity levels   · Reduce weight and determine a healthy BMI goal  · Monitor blood pressure and treat if higher than 140/90 mmHg  · Maintain blood total cholesterol levels under 5 mmol/l or 190 mg/dl  · Maintain LDL cholesterol levels under 3.0 mmol/l or 115 mg/dl   · Control blood glucose levels  · Consider taking aspirin (75 mg daily), once blood pressure is controlled   Provided a follow up plan. Time spent (minutes): 10  Tobacco Cessation Counseling: Patient advised about behavior change, including information about personal health harms, usage of appropriate cessation measures and benefits of cessation. Time spent (minutes): 10    I have reviewed patient's pertinent medical history, relevant laboratory and imaging studies, and past/future health maintenance.  Discussed with the patient the importance of adhering to their current medication regimen as directed. Advised the patient that they should continue to work on eating a healthy balanced diet and staying active by exercising within their personal limits. Orders as listed above. Patient was advised to keep future appointments with their respective specialty care team(s). Patient had the opportunity to ask questions, all of which were answered to the best of my ability and with patient satisfaction. Patient understands and is agreeable with the care plan following today's visit. Patient is to schedule an appointment for any new or worsening symptoms. Go to ER for significant shortness of breath, chest pain, or uncontrolled pain or fever. I discussed with patient the risk and benefits of any medications that were prescribed today. I verified that the patient understands their medications, labs, and/or procedures. The patient is doing well with current medication regimen and does not have any barriers to adherence. The patient's self-management abilities are good.      Care Gaps Addressed  Call insurance company to discuss coverage for shingles vaccine (Shingrix) 2 dose series   Low dose CT scan recommended- agreed and ordered  Mammo overdue- 2016- discussed again and ordered  TDAP vaccine recommended- call insurance to discuss coverage  PNA vaccine recommended  AWV today  [place] 2018, fair bowel prep, recommend repeat 2021, pt agreed to referral    Follow Up in 3 Months for Anxiety

## 2022-03-01 NOTE — PATIENT INSTRUCTIONS
Please call [address] [place] [address] to schedule your mammogram or [phone] for mammogram Surjit Cisneros      Complete living will and healthcare power of  packet with 2 witnesses unrelated to you or a notary and bring copy back to office for medical file and give copies to healthcare power of attorneys    Call [address] [place] [address] to schedule CT scan for lung cancer screening         Advance Directives: Care Instructions  Overview  An advance directive is a legal way to state your wishes at the end of your life. It tells your family and your doctor what to do if you can't say what you want. There are two main types of advance directives. You can change them any time your wishes change. Living will. This form tells your family and your doctor your wishes about life support and other treatment. The form is also called a declaration. Medical power of . This form lets you name a person to make treatment decisions for you when you can't speak for yourself. This person is called a health care agent (health care proxy, health care surrogate). The form is also called a durable power of  for health care. If you do not have an advance directive, decisions about your medical care may be made by a family member, or by a doctor or a  who doesn't know you. It may help to think of an advance directive as a gift to the people who care for you. If you have one, they won't have to make tough decisions by themselves. Follow-up care is a key part of your treatment and safety. Be sure to make and go to all appointments, and call your doctor if you are having problems. It's also a good idea to know your test results and keep a list of the medicines you take. What should you include in an advance directive? Many states have a unique advance directive form. (It may ask you to address specific issues.) Or you might use a universal form that's approved by many states.   If your form doesn't tell you what to address, it may be hard to know what to include in your advance directive. Use the questions below to help you get started. · Who do you want to make decisions about your medical care if you are not able to? · What life-support measures do you want if you have a serious illness that gets worse over time or can't be cured? · What are you most afraid of that might happen? (Maybe you're afraid of having pain, losing your independence, or being kept alive by machines.)  · Where would you prefer to die? (Your home? A hospital? A nursing home?)  · Do you want to donate your organs when you die? · Do you want certain Pentecostal practices performed before you die? When should you call for help? Be sure to contact your doctor if you have any questions. Where can you learn more? Go to https://Zandopemarcellaeb.SwiftStack. org and sign in to your eSecure Systems account. Enter R264 in the Classiqs box to learn more about \"Advance Directives: Care Instructions. \"     If you do not have an account, please click on the \"Sign Up Now\" link. Current as of: March 17, 2021               Content Version: 13.1  © 5612-3898 [employer]. Care instructions adapted under license by [facility] ([facility]). If you have questions about a medical condition or this instruction, always ask your healthcare professional. [address] any warranty or liability for your use of this information. Learning About Medical Power of   What is a medical power of ? A medical power of , also called a durable power of  for health care, is one type of the legal forms called advance directives. It lets you name the person you want to make treatment decisions for you if you can't speak or decide for yourself. The person you choose is called your health care agent. This person is also called a health care proxy or health care surrogate. A medical power of  may be called something else in your state.   How do you choose a health care agent? Choose your health care agent carefully. This person may or may not be a family member. Talk to the person before you make your final decision. Make sure he or she is comfortable with this responsibility. It's a good idea to choose someone who:  · Is at least 25years old. · Knows you well and understands what makes life meaningful for you. · Understands your Scientologist and moral values. · Will do what you want, not what he or she wants. · Will be able to make difficult choices at a stressful time. · Will be able to refuse or stop treatment, if that is what you would want, even if you could die. · Will be firm and confident with health professionals if needed. · Will ask questions to get needed information. · Lives near you or agrees to travel to you if needed. Your family may help you make medical decisions while you can still be part of that process. But it's important to choose one person to be your health care agent in case you aren't able to make decisions for yourself. If you don't fill out the legal form and name a health care agent, the decisions your family can make may be limited. A health care agent may be called something else in your state. Who will make decisions for you if you don't have a health care agent? If you don't have a health care agent or a living will, you may not get the care you want. Decisions may be made by family members who disagree about your medical care. Or decisions may be made by a medical professional who doesn't know you well. In some cases, a  makes the decisions. When you name a health care agent, it is very clear who has the power to make health decisions for you. How do you name a health care agent? You name your health care agent on a legal form. This form is usually called a medical power of . Ask your hospital, state bar association, or office on aging where to find these forms.   You must sign the form to make it legal. Some states require you to get the form notarized. This means that a person called a  watches you sign the form and then he or she signs the form. Some states also require that two or more witnesses sign the form. Be sure to tell your family members and doctors who your health care agent is. Where can you learn more? Go to https://chpepiceweb.Navendis. org and sign in to your [email or web address] account. Enter [id number] in the [facility] box to learn more about \"Learning About [address]. \"     If you do not have an account, please click on the \"Sign Up Now\" link. Current as of: March 17, 2021               Content Version: 13.1  © 2006-2021 Healthwise, [employer]. Care instructions adapted under license by [facility] ([facility]). If you have questions about a medical condition or this instruction, always ask your healthcare professional. [place] [address] any warranty or liability for your use of this information. Learning About Beto Lefty Bonds  What is a living will? A living will, also called a declaration, is a legal form. It tells your family and your doctor your wishes when you can't speak for yourself. It's used by the health professionals who will treat you as you near the end of your life or if you get seriously hurt or ill. If you put your wishes in writing, your loved ones and others will know what kind of care you want. They won't need to guess. This can ease your mind and be helpful to others. And you can change or cancel your living will at any time. A living will is not the same as an estate or property will. An estate will explains what you want to happen with your money and property after you die. How do you use it? A living will is used to describe the kinds of treatment or life support you want as you near the end of your life or if you get seriously hurt or ill. Keep these facts in mind about living agrawal.   · Your living will is used only if you can't speak or make decisions for yourself. Most often, one or more doctors must certify that you can't speak or decide for yourself before your living will takes effect. · If you get better and can speak for yourself again, you can accept or refuse any treatment. It doesn't matter what you said in your living will. · Some states may limit your right to refuse treatment in certain cases. For example, you may need to clearly state in your living will that you don't want artificial hydration and nutrition, such as being fed through a tube. Is a living will a legal document? A living will is a legal document. Each state has its own laws about living agrawal. And a living will may be called something else in your state. Here are some things to know about living agrawal. · You don't need an  to complete a living will. But legal advice can be helpful if your state's laws are unclear. It can also help if your health history is complicated or your family can't agree on what should be in your living will. · You can change your living will at any time. Some people find that their wishes about end-of-life care change as their health changes. If you make big changes to your living will, complete a new form. · If you move to another state, make sure that your living will is legal in the state where you now live. In most cases, doctors will respect your wishes even if you have a form from a different state. · You might use a universal form that has been approved by many states. This kind of form can sometimes be filled out and stored online. Your digital copy will then be available wherever you have a connection to the internet. The doctors and nurses who need to treat you can find it right away. · Your state may offer an online registry. This is another place where you can store your living will online. · It's a good idea to get your living will notarized.  This means using a person called a  to watch two people sign, or witness, your living will. What should you know when you create a living will? Here are some questions to ask yourself as you make your living will:  · Do you know enough about life support methods that might be used? If not, talk to your doctor so you know what might be done if you can't breathe on your own, your heart stops, or you can't swallow. · What things would you still want to be able to do after you receive life-support methods? Would you want to be able to walk? To speak? To eat on your own? To live without the help of machines? · Do you want certain Nondenominational practices performed if you become very ill? · If you have a choice, where do you want to be cared for? In your home? At a hospital or nursing home? · If you have a choice at the end of your life, where would you prefer to die? At home? In a hospital or nursing home? Somewhere else? · Would you prefer to be buried or cremated? · Do you want your organs to be donated after you die? What should you do with your living will? · Make sure that your family members and your health care agent have copies of your living will (also called a declaration). · Give your doctor a copy of your living will. Ask him or her to keep it as part of your medical record. If you have more than one doctor, make sure that each one has a copy. · Put a copy of your living will where it can be easily found. For example, some people may put a copy on their refrigerator door. If you are using a digital copy, be sure your doctor, family members, and health care agent know how to find and access it. Where can you learn more? Go to https://chcruzeb.FTL SOLAR. org and sign in to your Typesafe account. Enter I203 in the FanFound box to learn more about \"Learning About Living Ayush Brightly. \"     If you do not have an account, please click on the \"Sign Up Now\" link.   Current as of: March 17, 2021               Content Version: 13.1  © 1984-0865 Healthwise, Incorporated. Care instructions adapted under license by [facility] ([facility]). If you have questions about a medical condition or this instruction, always ask your healthcare professional. [address] any warranty or liability for your use of this information. Stopping Smoking: Care Instructions  Your Care Instructions     Cigarette smokers crave the nicotine in cigarettes. Giving it up is much harder than simply changing a habit. Your body has to stop craving the nicotine. It is hard to quit, but you can do it. There are many tools that people use to quit smoking. You may find that combining tools works best for you. There are several steps to quitting. First you get ready to quit. Then you get support to help you. After that, you learn new skills and behaviors to become a nonsmoker. For many people, a necessary step is getting and using medicine. Your doctor will help you set up the plan that best meets your needs. You may want to attend a smoking cessation program to help you quit smoking. When you choose a program, look for one that has proven success. Ask your doctor for ideas. You will greatly increase your chances of success if you take medicine as well as get counseling or join a cessation program.  Some of the changes you feel when you first quit tobacco are uncomfortable. Your body will miss the nicotine at first, and you may feel short-tempered and grumpy. You may have trouble sleeping or concentrating. Medicine can help you deal with these symptoms. You may struggle with changing your smoking habits and rituals. The last step is the tricky one: Be prepared for the smoking urge to continue for a time. This is a lot to deal with, but keep at it. You will feel better. Follow-up care is a key part of your treatment and safety.  Be sure to make and go to all appointments, and call your doctor if you are having problems. It's also a good idea to know your test results and keep a list of the medicines you take. How can you care for yourself at home? · Ask your family, friends, and coworkers for support. You have a better chance of quitting if you have help and support. · Join a support group, such as Nicotine Anonymous, for people who are trying to quit smoking. · Consider signing up for a smoking cessation program, such as the American Lung Association's Freedom from Smoking program.  · Get text messaging support. Go to the website at www.smokefree. gov to sign up for the [facility] program.  · Set a quit date. Pick your date carefully so that it is not right in the middle of a big deadline or stressful time. Once you quit, do not even take a puff. Get rid of all ashtrays and lighters after your last cigarette. Clean your house and your clothes so that they do not smell of smoke. · Learn how to be a nonsmoker. Think about ways you can avoid those things that make you reach for a cigarette. ? Avoid situations that put you at greatest risk for smoking. For some people, it is hard to have a drink with friends without smoking. For others, they might skip a coffee break with coworkers who smoke. ? Change your daily routine. Take a different route to work or eat a meal in a different place. · Cut down on stress. Calm yourself or release tension by doing an activity you enjoy, such as reading a book, taking a hot bath, or gardening. · Talk to your doctor or pharmacist about nicotine replacement therapy, which replaces the nicotine in your body. You still get nicotine but you do not use tobacco. Nicotine replacement products help you slowly reduce the amount of nicotine you need. These products come in several forms, many of them available over-the-counter:  ? Nicotine patches  ? Nicotine gum and lozenges  ?  Nicotine inhaler  · Ask your doctor about bupropion (Wellbutrin) or varenicline (Chantix), which are prescription medicines. They do not contain nicotine. They help you by reducing withdrawal symptoms, such as stress and anxiety. · Some people find hypnosis, acupuncture, and massage helpful for ending the smoking habit. · Eat a healthy diet and get regular exercise. Having healthy habits will help your body move past its craving for nicotine. · Be prepared to keep trying. Most people are not successful the first few times they try to quit. Do not get mad at yourself if you smoke again. Make a list of things you learned and think about when you want to try again, such as next week, next month, or next year. Where can you learn more? Go to https://GaleneapeTalkito.Web Performance. org and sign in to your [employer] account. Enter U088 in the Crack box to learn more about \"Stopping Smoking: Care Instructions. \"     If you do not have an account, please click on the \"Sign Up Now\" link. Current as of: February 11, 2021               Content Version: 13.1  © 2006-2021 [employer]. Care instructions adapted under license by [facility] . If you have questions about a medical condition or this instruction, always ask your healthcare professional. [place] [address] any warranty or liability for your use of this information. Learning About Benefits From Quitting Smoking  How does quitting smoking make you healthier? If you're thinking about quitting smoking, you may have a few reasons to be smoke-free. Your health may be one of them. · When you quit smoking, you lower your risks for cancer, lung disease, heart attack, stroke, blood vessel disease, and blindness from macular degeneration. · When you're smoke-free, you get sick less often, and you heal faster. You are less likely to get colds, flu, bronchitis, and pneumonia. · As a nonsmoker, you may find that your mood is better and you are less stressed. When and how will you feel healthier?   Quitting has real health benefits that start from day 1 of being smoke-free. And the longer you stay smoke-free, the healthier you get and the better you feel. The first hours  · After just 20 minutes, your blood pressure and heart rate go down. That means there's less stress on your heart and blood vessels. · Within 12 hours, the level of carbon monoxide in your blood drops back to normal. That makes room for more oxygen. With more oxygen in your body, you may notice that you have more energy than when you smoked. After 2 weeks  · Your lungs start to work better. · Your risk of heart attack starts to drop. After 1 month  · When your lungs are clear, you cough less and breathe deeper, so it's easier to be active. · Your sense of taste and smell return. That means you can enjoy food more than you have since you started smoking. Over the years  · Over the years, your risks of heart disease, heart attack, and stroke are lower. · After 10 years, your risk of dying from lung cancer is cut by about half. And your risk for many other types of cancer is lower too. How would quitting help others in your life? When you quit smoking, you improve the health of everyone who now breathes in your smoke. · Their heart, lung, and cancer risks drop, much like yours. · They are sick less. For babies and small children, living smoke-free means they're less likely to have ear infections, pneumonia, and bronchitis. · If you're a woman who is or will be pregnant someday, quitting smoking means a healthier . · Children who are close to you are less likely to become adult smokers. Where can you learn more? Go to https://shaan.FiFully. org and sign in to your Yesmywine account. Enter [id number] [id number] [phone] in the [facility] box to learn more about \"Learning About Benefits From Quitting Smoking. \"     If you do not have an account, please click on the \"Sign Up Now\" link.   Current as of: 2021               Content Version: 13.1  © 2006-2021 Healthwise, Incorporated. Care instructions adapted under license by [facility] ([facility]). If you have questions about a medical condition or this instruction, always ask your healthcare professional. [address] any warranty or liability for your use of this information. Personalized Preventive Plan for Sera Weaver - 3/1/2022  Medicare offers a range of preventive health benefits. Some of the tests and screenings are paid in full while other may be subject to a deductible, co-insurance, and/or copay. Some of these benefits include a comprehensive review of your medical history including lifestyle, illnesses that may run in your family, and various assessments and screenings as appropriate. After reviewing your medical record and screening and assessments performed today your provider may have ordered immunizations, labs, imaging, and/or referrals for you. A list of these orders (if applicable) as well as your Preventive Care list are included within your After Visit Summary for your review. Other Preventive Recommendations:    · A preventive eye exam performed by an eye specialist is recommended every 1-2 years to screen for glaucoma; cataracts, macular degeneration, and other eye disorders. · A preventive dental visit is recommended every 6 months. · Try to get at least 150 minutes of exercise per week or 10,000 steps per day on a pedometer . · Order or download the FREE \"Exercise & Physical Activity: Your Everyday Guide\" from [employer]. Call 8-605.792.6764 or search [employer] online. · You need 8374-5876 mg of calcium and 9412-7452 IU of vitamin D per day. It is possible to meet your calcium requirement with diet alone, but a vitamin D supplement is usually necessary to meet this goal.  · When exposed to the sun, use a sunscreen that protects against both UVA and UVB radiation with an SPF of 30 or greater. Reapply every 2 to 3 hours or after sweating, drying off with a towel, or swimming. · Always wear a seat belt when traveling in a car. Always wear a helmet when riding a bicycle or motorcycle. Patient Education        buspirone  Pronunciation:  jose MAXIMINO hugo  Brand: BuSpar  What is the most important information I should know about buspirone? Do not use this medicine if you have used an MAO inhibitor in the past 14 days, such as isocarboxazid, linezolid, methylene blue injection, phenelzine, rasagiline, selegiline, or tranylcypromine. What is buspirone? Buspirone is used to treat symptoms of anxiety, such as fear, tension, irritability, dizziness, pounding heartbeat, and other physical symptoms. Buspirone is not an anti-psychotic medication and should not be used in place of medication prescribed by your doctor for mental illness. Buspirone may also be used for purposes not listed in this medication guide. What should I discuss with my healthcare provider before taking buspirone? You should not use buspirone if you are allergic to it. Do not use buspirone if you have used an MAO inhibitor in the past 14 days. A dangerous drug interaction could occur. MAO inhibitors include isocarboxazid, linezolid, methylene blue injection, phenelzine, rasagiline, selegiline, tranylcypromine, and others. You may need to wait 14 days after stopping buspirone before you can take an MAOI. Tell your doctor if you have ever had:  kidney disease; or  liver disease. Be sure your doctor knows if you also take stimulant medicine, opioid medicine, herbal products, or medicine for depression, mental illness, Parkinson's disease, migraine headaches, serious infections, or prevention of nausea and vomiting. These medicines may interact with buspirone and cause a serious condition called serotonin syndrome. Tell your doctor if you are pregnant. You should not breastfeed while using buspirone.   Do not give this medicine to a child without medical advice. How should I take buspirone? Follow all directions on your prescription label and read all medication guides or instruction sheets. Your doctor may occasionally change your dose. Use the medicine exactly as directed. You may take buspirone with or without food but take it the same way each time. If you have switched to buspirone from another anxiety medication, you may need to slowly decrease your dose of the other medication rather than stopping suddenly. Some anxiety medications can cause withdrawal symptoms when you stop taking them suddenly after long-term use. Read and carefully follow any Instructions for Use provided with your medicine. Ask your doctor or pharmacist if you do not understand these instructions. Some buspirone tablets are scored so you can break the tablet into 2 or 3 pieces in order to take a smaller dose. Do not use a buspirone tablet if it has not been broken correctly and the piece is too big or too small. Follow your doctor's instructions about how much of the tablet to take. Buspirone can cause false positive results with certain medical tests. You may need to stop using the medicine for at least 48 hours before your test. Tell any doctor who treats you that you are using buspirone. Store at room temperature away from moisture, heat, and light. What happens if I miss a dose? Take the medicine as soon as you can, but skip the missed dose if it is almost time for your next dose. Do not take two doses at one time. What happens if I overdose? Seek emergency medical attention or call the Poison Help line at 1-185.621.4768. What should I avoid while taking buspirone? Avoid driving or hazardous activity until you know how this medicine will affect you. Your reactions could be impaired. Drinking alcohol may increase certain side effects of buspirone. Grapefruit may interact with buspirone and lead to unwanted side effects.  Avoid the use of grapefruit products. What are the possible side effects of buspirone? Get emergency medical help if you have signs of an allergic reaction: hives; difficult breathing; swelling of your face, lips, tongue, or throat. Call your doctor at once if you have:  chest pain;  shortness of breath; or  a light-headed feeling, like you might pass out. Seek medical attention right away if you have symptoms of serotonin syndrome, such as: agitation, hallucinations, fever, sweating, shivering, fast heart rate, muscle stiffness, twitching, loss of coordination, nausea, vomiting, or diarrhea. Common side effects may include:  headache;  dizziness, feeling light-headed;  nausea; or  feeling nervous or excited. This is not a complete list of side effects and others may occur. Call your doctor for medical advice about side effects. You may report side effects to FDA at 2-517-FDA-5700. What other drugs will affect buspirone? Using buspirone with other drugs that make you drowsy or slow your breathing can worsen these effects. Ask your doctor before using opioid medication, a sleeping pill, a muscle relaxer, or medicine for anxiety or seizures. Tell your doctor about all your current medicines. Many drugs can affect buspirone, especially:  nefazodone;  Misty's wort;  an antibiotic --clarithromycin, erythromycin, rifabutin, rifampin, rifapentine, telithromycin;  antifungal medicine --itraconazole, ketoconazole;  antiviral medicine to treat HIV/AIDS --efavirenz, indinavir, nelfinavir, nevirapine, ritonavir, saquinavir;  cancer medicine --apalutamide, enzalutamide, mitotane;  heart or blood pressure medicines --diltiazem, verapamil;  seizure medicine --carbamazepine, oxcarbazepine, phenytoin, primidone; or  steroid medicine --dexamethasone, prednisone. This list is not complete and many other drugs may affect buspirone. This includes prescription and over-the-counter medicines, vitamins, and herbal products.  Not all possible drug interactions are listed here. Where can I get more information? Your pharmacist can provide more information about buspirone. Remember, keep this and all other medicines out of the reach of children, never share your medicines with others, and use this medication only for the indication prescribed. Every effort has been made to ensure that the information provided by [address]deep [address] is accurate, up-to-date, and complete, but no guarantee is made to that effect. Drug information contained herein may be time sensitive. [facility] information has been compiled for use by healthcare practitioners and consumers in the [place] [place] and therefore [facility] does not warrant that uses outside of the [place] are appropriate, unless specifically indicated otherwise. [facility]'s drug information does not endorse drugs, diagnose patients or recommend therapy. [facility]'s drug information is an informational resource designed to assist licensed healthcare practitioners in caring for their patients and/or to serve consumers viewing this service as a supplement to, and not a substitute for, the expertise, skill, knowledge and judgment of healthcare practitioners. The absence of a warning for a given drug or drug combination in no way should be construed to indicate that the drug or drug combination is safe, effective or appropriate for any given patient. [facility] does not assume any responsibility for any aspect of healthcare administered with the aid of information [facility] provides. The information contained herein is not intended to cover all possible uses, directions, precautions, warnings, drug interactions, allergic reactions, or adverse effects. If you have questions about the drugs you are taking, check with your doctor, nurse or pharmacist.  Copyright 8967-1019 [address]. Version: 6.01. Revision date: 2/24/2020. Care instructions adapted under license by [facility] ([facility]).  If you have questions about a medical condition or this instruction, always ask your healthcare professional. [place] [address] any warranty or liability for your use of this information. Patient Education        Learning About Breast Cancer Screening  What is breast cancer screening? Breast cancer occurs when cells that are not normal grow in one or both of your breasts. Screening tests can help find breast cancer early. Cancer is easier to treat when it's found early. Having concerns about breast cancer is common. That's why it's important to talk with your doctor about when to start and how often to get screened for breast cancer. How is breast cancer screening done? Several screening tests can be used to check for breast cancer. Mammograms. These tests check for signs of cancer using X-rays. They can show tumors that are too small for you or your doctor to feel. During a mammogram, a machine squeezes your breasts to make them flatter and easier to X-ray. At least two pictures are taken of each breast. One is taken from the top and one from the side. 3-D mammograms. These tests are also called digital breast tomosynthesis. Your breast is positioned on a flat plate. A top plate is pressed against your breast to keep it in position. The X-ray arm then moves in an arc above the breast and takes many pictures. A computer uses these X-rays to create a three-dimensional image. Clinical breast exam.  In this exam, your doctor carefully feels your breasts and under your arms to check for lumps or other changes. Who should be screened for breast cancer? Experts agree that mammograms are the best screening test for people at average risk of breast cancer. But they don't all agree on the age at which screening should start. And they don't agree on whether it's better to be screened every year or every two years.   Here are some of the recommendations from experts:  Start by age 36 and have a mammogram each year.  Start at age 39 and have a mammogram each year. Start at age 48 and have a mammogram every 2 years. When to stop having mammograms is another decision. You and your doctor can decide on the right age to start and stop screening based on your personal preferences and overall health. What is your risk for breast cancer? If you don't already know your risk of breast cancer, you can ask your doctor about it. You can also look it up at www.cancer.gov/bcrisktool/. If your doctor says that you have a high or very high risk, ask about ways to reduce your risk. These could include getting extra screening, taking medicine, or having surgery. If you have a strong family history of breast cancer, ask your doctor about genetic testing. What steps can you take to stay healthy? Some things that increase your risk of breast cancer, such as your age and being female, cannot be controlled. But you can do some things to stay as healthy as you can. Learn what your breasts normally look and feel like. If you notice any changes, tell your doctor. If you drink alcohol, limit how much you drink. Any amount of alcohol may increase your risk for some types of cancer. If you smoke, quit. When you quit smoking, you lower your chances of getting many types of cancer. You can also do your best to eat well, be active, and stay at a healthy weight. Eating healthy foods and being active every day, as well as staying at a healthy weight, may help prevent cancer. Where can you learn more? Go to [email or web address] and sign in to your Ent[employer] account. Enter N251 in the [facility] box to learn more about \"Learning About Breast Cancer Screening. \"     If you do not have an account, please click on the \"Sign Up Now\" link. Current as of: September 8, 2021               Content Version: 13.1  © 9482-6868 Healthwise, Incorporated. Care instructions adapted under license by [facility] ([facility]).  If you have questions about a medical condition or this instruction, always ask your healthcare professional. [address] any warranty or liability for your use of this information. Patient Education        Mammogram: About This Test  What is it? A mammogram is an X-ray of the breast that is used to screen for breast cancer. This test can find tumors that are too small for you or your doctor to feel. Cancer is most easily treated when it is found at an early stage. Why is this test done? A mammogram is done to:  Look for breast cancer when there are no symptoms. Find breast cancer when there are symptoms. Symptoms of breast cancer may include a lump or thickening in the breast, nipple discharge, or dimpling of the skin on one area of the breast.  Find an area of suspicious breast tissue to remove for an exam under a microscope (biopsy). How do you prepare for the test?  If you've had a mammogram before at another clinic, have the results sent or bring them with you to your appointment. On the day of the mammogram, don't use any deodorant. And don't use perfume, powders, or ointments near or on your breasts. The residue left on your skin by these substances may interfere with the X-rays. How is the test done? You will need to take off any jewelry that might interfere with the X-ray pictures. You will need to take off your clothes above the waist.  You will be given a cloth or paper gown to use during the test.  You probably will stand during the mammogram.  One at a time, your breasts will be placed on a flat plate. Another plate is then pressed firmly against your breast to help flatten out the breast tissue. You may be asked to lift your arm. For a few seconds while the X-ray picture is being taken, you will need to hold your breath. At least two pictures are taken of each breast. One is taken from the top and one from the side. How does having a mammogram feel?   A mammogram is usually found with screening tests. Depending on the type of test, any polyps found may be removed during the tests. Colorectal cancer usually does not cause symptoms at first. But regular tests can help find it early, before it spreads and becomes harder to treat. Your risk for colorectal cancer gets higher as you get older. Experts recommend starting screening at age 39 for people who are at average risk. Talk with your doctor about your risk and when to start and stop screening. You may have one of several tests. Follow-up care is a key part of your treatment and safety. Be sure to make and go to all appointments, and call your doctor if you are having problems. It's also a good idea to know your test results and keep a list of the medicines you take. What are the main screening tests for colon cancer? The screening tests are:  Stool tests. These include the guaiac fecal occult blood test (gFOBT), the fecal immunochemical test (FIT), and the combined fecal immunochemical test and stool DNA test (FIT-DNA). These tests check stool samples for signs of cancer. If your test is positive, you will need to have a colonoscopy. Sigmoidoscopy. This test lets your doctor look at the lining of your rectum and the lowest part of your colon. Your doctor uses a lighted tube called a sigmoidoscope. This test can't find cancers or polyps in the upper part of your colon. In some cases, polyps that are found can be removed. But if your doctor finds polyps, you will need to have a colonoscopy to check the upper part of your colon. Colonoscopy. This test lets your doctor look at the lining of your rectum and your entire colon. The doctor uses a thin, flexible tool called a colonoscope. It can also be used to remove polyps or get a tissue sample (biopsy). A less common test is CT colonography (CTC). It's also called virtual colonoscopy. Who should be screened for colorectal cancer?   Your risk for colorectal cancer gets higher as you get older. Experts recommend starting screening at age 39 for people who are at average risk. Talk with your doctor about your risk and when to start and stop screening. How often you need screening depends on the type of test you get:  Stool tests. Every year for FIT or gFOBT. Every 1 to 3 years for sDNA, also called FIT-DNA. Tests that look inside the colon. Every 5 years for sigmoidoscopy. (If you do the FIT test every year, you can get this test every 10 years.)  Every 5 years for CT colonography (virtual colonoscopy). Every 10 years for colonoscopy. Experts agree that people at higher risk may need to be tested sooner and more often. This includes people who have a strong family history of colon cancer. Talk to your doctor about which test is best for you and when to be tested. When should you call for help? Watch closely for changes in your health, and be sure to contact your doctor if:    You have any changes in your bowel habits.     You have any problems. Where can you learn more? Go to [email or web address] and sign in to your S[employer] account. Enter [phone] in the [facility] box to learn more about \"Colon Cancer Screening: Care Instructions. \"     If you do not have an account, please click on the \"Sign Up Now\" link. Current as of: September 8, 2021               Content Version: 13.1  © 2006-2021 Healthwise, Incorporated. Care instructions adapted under license by [facility] ([facility]). If you have questions about a medical condition or this instruction, always ask your healthcare professional. [place] [address] any warranty or liability for your use of this information. Patient Education        Pneumococcal Polysaccharide Vaccine: What You Need to Know  Why get vaccinated? Pneumococcal polysaccharide vaccine (PPSV23) can prevent pneumococcal disease. Pneumococcal disease refers to any illness caused by pneumococcal bacteria. These bacteria can cause many types of illnesses, including pneumonia, which is an infection of the lungs. Pneumococcal bacteria are one of the most common causes of pneumonia. Besides pneumonia, pneumococcal bacteria can also cause:  Ear infections,  Sinus infections  Meningitis (infection of the tissue covering the brain and spinal cord)  Bacteremia (bloodstream infection)  Anyone can get pneumococcal disease, but children under 3years of age, people with certain medical conditions, adults 72 years or older, and cigarette smokers are at the highest risk. Most pneumococcal infections are mild. However, some can result in long-term problems, such as brain damage or hearing loss. Meningitis, bacteremia, and pneumonia caused by pneumococcal disease can be fatal.  PPSV23  PPSV23 protects against 23 types of bacteria that cause pneumococcal disease. PPSV23 is recommended for: All adults 72 years or older,  Anyone 2 years or older with certain medical conditions that can lead to an increased risk for pneumococcal disease. Most people need only one dose of PPSV23. A second dose of PPSV23, and another type of pneumococcal vaccine called PCV13, are recommended for certain high-risk groups. Your health care provider can give you more information. People 65 years or older should get a dose of PPSV23 even if they have already gotten one or more doses of the vaccine before they turned 65. Talk with your health care provider  Tell your vaccine provider if the person getting the vaccine:  Has had an allergic reaction after a previous dose of PPSV23, or has any severe, life-threatening allergies. In some cases, your health care provider may decide to postpone PPSV23 vaccination to a future visit. People with minor illnesses, such as a cold, may be vaccinated. People who are moderately or severely ill should usually wait until they recover before getting PPSV23.   Your health care provider can give you more information. Risks of a vaccine reaction  Redness or pain where the shot is given, feeling tired, fever, or muscle aches can happen after PPSV23. People sometimes faint after medical procedures, including vaccination. Tell your provider if you feel dizzy or have vision changes or ringing in the ears. As with any medicine, there is a very remote chance of a vaccine causing a severe allergic reaction, other serious injury, or death. What if there is a serious problem? An allergic reaction could occur after the vaccinated person leaves the clinic. If you see signs of a severe allergic reaction (hives, swelling of the face and throat, difficulty breathing, a fast heartbeat, dizziness, or weakness), call 9-1-1 and get the person to the nearest hospital.  For other signs that concern you, call your health care provider. Adverse reactions should be reported to the Vaccine Adverse Event Reporting System (VAERS). Your health care provider will usually file this report, or you can do it yourself. Visit the VAERS website at www.vaers. hhs.gov at www.vaers. hhs.gov or call 4-214.918.4882. VAERS is only for reporting reactions, and VAERS staff do not give medical advice. How can I learn more? Ask your health care provider. Call your local or state health department. Contact the Centers for Disease Control and Prevention (CDC): Call 8-901.453.2174 (1-800-CDC-INFO) or  Visit CDC's website at www.cdc.gov/vaccines  Vaccine Information Statement  PPSV23 Vaccine  10/30/2019  [facility] and [facility] for Disease Control and Prevention  Many Vaccine Information Statements are available in Tamazight and other languages. See www.immunize.org/vis. Hojas de información Sobre Vacunas están disponibles en español y en muchos otros idiomas. Visite Bry.si. Care instructions adapted under license by [facility] ([facility]).  If you have questions about a medical condition or this instruction, always ask your healthcare professional. [place] [address] any warranty or liability for your use of this information.

## 2022-03-02 NOTE — TELEPHONE ENCOUNTER
From: Peggy Quezada  To: Estephania Weaver  Sent: 3/1/2022 12:26 PM EST  Subject: Pneumonia Vaccine    Kim,     We forgot to do your pneumonia vaccine at your visit today!! Oops! We can do it at your next visit or schedule you a lab visit if you want it sooner.      Twila Mcgee, APRN - CNP

## 2022-03-07 ENCOUNTER — NURSE ONLY (OUTPATIENT)
Dept: FAMILY MEDICINE CLINIC | Age: 67
End: 2022-03-07
Payer: MEDICARE

## 2022-03-07 DIAGNOSIS — Z23 NEED FOR 23-POLYVALENT PNEUMOCOCCAL POLYSACCHARIDE VACCINE: Primary | ICD-10-CM

## 2022-03-07 PROCEDURE — 90732 PPSV23 VACC 2 YRS+ SUBQ/IM: CPT | Performed by: NURSE PRACTITIONER

## 2022-03-07 PROCEDURE — G0009 ADMIN PNEUMOCOCCAL VACCINE: HCPCS | Performed by: NURSE PRACTITIONER

## 2022-03-07 NOTE — PROGRESS NOTES
Immunizations Administered     Name Date Dose Route    Pneumococcal Polysaccharide ([id number]) 3/7/2022 0.5 mL Intramuscular    Site: Deltoid- Right    Lot: [id number]    NDC: 2572-0400-77

## 2022-03-15 ENCOUNTER — PATIENT MESSAGE (OUTPATIENT)
Dept: FAMILY MEDICINE CLINIC | Age: 67
End: 2022-03-15

## 2022-03-15 ENCOUNTER — TELEPHONE (OUTPATIENT)
Dept: FAMILY MEDICINE CLINIC | Age: 67
End: 2022-03-15

## 2022-03-15 DIAGNOSIS — F41.9 ANXIETY: Primary | ICD-10-CM

## 2022-03-15 NOTE — TELEPHONE ENCOUNTER
----- Message from Rosalia Reyes sent at 9/86/0147  9:46 AM EDT -----  Subject: Referral Request    QUESTIONS   Reason for referral request? Pt requesting a referral for a psychiatrist,   Pt reached out to Dr Niki Tolentino, however, not accepting new pt. Has the physician seen you for this condition before? No   Preferred Specialist (if applicable)? Mehrdad Panchal  Do you already have an appointment scheduled? No  Additional Information for Provider?   ---------------------------------------------------------------------------  --------------  CALL BACK INFO  What is the best way for the office to contact you? OK to leave message on   voicemail  Preferred Call Back Phone Number?  [phone]

## 2022-03-15 NOTE — TELEPHONE ENCOUNTER
LVM FOR PT TO CALL THE OFFICE BACK. I LET HER KNOW NIKHIL IS OUT THIS WEEK AND JUST WANT TO KNOW IF THIS CAN WAIT FOR NIKHIL TO RETURN OR IF SHE WANTS ANOTHER PROVIDER COVERING TO HANDLE THIS.  SC

## 2022-03-24 ENCOUNTER — PATIENT MESSAGE (OUTPATIENT)
Dept: FAMILY MEDICINE CLINIC | Age: 67
End: 2022-03-24

## 2022-03-24 NOTE — TELEPHONE ENCOUNTER
From: Sera Weaver  To: Nany Tao  Sent: 3/24/2022 1:01 PM EDT  Subject: Psyche    One of the girls sent a referral to the Dr that I chose. I can't find his name! I wrote it down, can't find it. Is it Mehrdad Panchal?  Thanks

## 2022-03-27 DIAGNOSIS — F41.1 GAD (GENERALIZED ANXIETY DISORDER): ICD-10-CM

## 2022-03-28 RX ORDER — LORAZEPAM 1 MG/1
TABLET ORAL
Qty: 120 TABLET | Refills: 0 | Status: SHIPPED | OUTPATIENT
Start: 2022-03-28 | End: 2022-05-02

## 2022-04-01 ENCOUNTER — PATIENT MESSAGE (OUTPATIENT)
Dept: FAMILY MEDICINE CLINIC | Age: 67
End: 2022-04-01

## 2022-04-01 NOTE — TELEPHONE ENCOUNTER
From: Sera Weaver  To: Frances Sharp  Sent: 4/1/2022 11:10 AM EDT  Subject: Shrink    I can't find a shrink! Seems like shrinks have their own groups that take mercy patients. When I spoke to , she said all Dr Cecil Mckeon have their own. The one that left your offices was ours. Although, she was a psychologist. Can you please find me one!!  Thank you! (Still here)

## 2022-04-12 ENCOUNTER — HOSPITAL ENCOUNTER (OUTPATIENT)
Dept: CT IMAGING | Age: 67
Discharge: HOME | End: 2022-04-12
Payer: MEDICARE

## 2022-04-12 ENCOUNTER — PATIENT MESSAGE (OUTPATIENT)
Dept: FAMILY MEDICINE CLINIC | Age: 67
End: 2022-04-12

## 2022-04-12 ENCOUNTER — HOSPITAL ENCOUNTER (OUTPATIENT)
Dept: WOMENS IMAGING | Age: 67
Discharge: HOME | End: 2022-04-12
Payer: MEDICARE

## 2022-04-12 DIAGNOSIS — N64.89 BREAST ASYMMETRY: ICD-10-CM

## 2022-04-12 DIAGNOSIS — Z71.6 ENCOUNTER FOR TOBACCO USE CESSATION COUNSELING: ICD-10-CM

## 2022-04-12 DIAGNOSIS — Z12.31 ENCOUNTER FOR SCREENING MAMMOGRAM FOR MALIGNANT NEOPLASM OF BREAST: ICD-10-CM

## 2022-04-12 DIAGNOSIS — Z72.0 TOBACCO ABUSE: ICD-10-CM

## 2022-04-12 DIAGNOSIS — Z71.6 ENCOUNTER FOR TOBACCO USE CESSATION COUNSELING: Primary | ICD-10-CM

## 2022-04-12 DIAGNOSIS — R92.8 ABNORMAL MAMMOGRAM OF RIGHT BREAST: Primary | ICD-10-CM

## 2022-04-12 PROCEDURE — 77067 SCR MAMMO BI INCL CAD: CPT

## 2022-04-12 PROCEDURE — 71271 CT THORAX LUNG CANCER SCR C-: CPT

## 2022-04-12 RX ORDER — NICOTINE 21 MG/24HR
1 PATCH, TRANSDERMAL 24 HOURS TRANSDERMAL DAILY
Qty: 30 PATCH | Refills: 0 | Status: SHIPPED | OUTPATIENT
Start: 2022-04-12 | End: 2022-05-16

## 2022-04-12 NOTE — TELEPHONE ENCOUNTER
From: Sera Weaver  To: Carolina Gimenez  Sent: 4/12/2022 4:21 PM EDT  Subject: Well,     Can you order me 2nd step nicotine patches? I tried the first, but ,it made me sick. I don't smoke the whole cigs, And I've been trying to use vape in evening, but then I get the muscle spasms. .. Hate that!! Don't know if Medicare pays for them, though.  Thanks

## 2022-04-15 ENCOUNTER — PATIENT MESSAGE (OUTPATIENT)
Dept: FAMILY MEDICINE CLINIC | Age: 67
End: 2022-04-15

## 2022-04-15 DIAGNOSIS — R10.9 ABDOMINAL CRAMPING: ICD-10-CM

## 2022-04-15 RX ORDER — DICYCLOMINE HCL 20 MG
TABLET ORAL
Qty: 120 TABLET | Refills: 1 | Status: SHIPPED | OUTPATIENT
Start: 2022-04-15 | End: 2022-04-15

## 2022-04-15 RX ORDER — DICYCLOMINE HCL 20 MG
TABLET ORAL
Qty: 360 TABLET | Refills: 0 | Status: SHIPPED | OUTPATIENT
Start: 2022-04-15 | End: 2022-07-12

## 2022-04-15 NOTE — TELEPHONE ENCOUNTER
From: Sera Weaver  To: Riky Callejas  Sent: 4/15/2022 10:40 AM EDT  Subject: Breasts    My tissue has always been dense. No babies. The past 2 times they saw something, they did needle bx, just cysts. I will get ul snd though, just for you! Also, today, tests I don't know what it is, showed up for my left breast? Scheduled for same day, 26th. Please tell me what that is Thanks.  See message#2

## 2022-04-15 NOTE — TELEPHONE ENCOUNTER
From: Sera Weaver  To: Ros Cramer  Sent: 4/15/2022 11:05 AM EDT  Subject: Buspar    So, still taking 4 ativan and 2, 5mg buspar daily. Wanted to wait till the brain settled down to let you know how it is. Less depressed, trying to do more. After about 3-4 times bending and moving things around, back goes. Making me frustrated. I think Dr Noemy Najera talked with you at some point. She has ordered a MRI for my sacrum. Will let you know when. The strength in my lower spine and sacral/ lumbar. Legs get weak and my back will bend and I can't straighten up. Pain in my legs and hips, and leak pee. So now when I do something I will wear the full back brace [facility] gave me when I cracked my vertebrae. Anyway, I'm feeling better mentally overall. I have decided to put a hold on the shrink for now. If my mental state gets wonky, I will go. As soon as my house sells I know that will help a bunch! Kim out!

## 2022-04-20 ENCOUNTER — PATIENT MESSAGE (OUTPATIENT)
Dept: FAMILY MEDICINE CLINIC | Age: 67
End: 2022-04-20

## 2022-04-20 ENCOUNTER — TELEPHONE (OUTPATIENT)
Dept: CASE MANAGEMENT | Age: 67
End: 2022-04-20

## 2022-04-20 DIAGNOSIS — F41.1 GAD (GENERALIZED ANXIETY DISORDER): ICD-10-CM

## 2022-04-20 RX ORDER — BUSPIRONE HYDROCHLORIDE 5 MG/1
TABLET ORAL
Qty: 270 TABLET | Refills: 3 | Status: SHIPPED | OUTPATIENT
Start: 2022-04-20 | End: 2022-05-16

## 2022-04-20 NOTE — TELEPHONE ENCOUNTER
CT Lung Cancer Screening completed on 4/12/2022, ordering provider, KRYSTIN Santos, reviewed radiology results with recommendations & office notified patient of results with recommendations. Smoking history reviewed.

## 2022-04-26 ENCOUNTER — HOSPITAL ENCOUNTER (OUTPATIENT)
Dept: ULTRASOUND IMAGING | Age: 67
Discharge: HOME | End: 2022-04-26
Payer: MEDICARE

## 2022-04-26 ENCOUNTER — HOSPITAL ENCOUNTER (OUTPATIENT)
Dept: WOMENS IMAGING | Age: 67
Discharge: HOME | End: 2022-04-26
Payer: MEDICARE

## 2022-04-26 DIAGNOSIS — K08.89 TOOTH PAIN: ICD-10-CM

## 2022-04-26 DIAGNOSIS — R92.8 ABNORMAL MAMMOGRAM OF RIGHT BREAST: ICD-10-CM

## 2022-04-26 DIAGNOSIS — N64.89 BREAST ASYMMETRY: ICD-10-CM

## 2022-04-26 DIAGNOSIS — R92.8 ABNORMAL MAMMOGRAM: ICD-10-CM

## 2022-04-26 PROCEDURE — G0279 TOMOSYNTHESIS, MAMMO: HCPCS

## 2022-04-26 PROCEDURE — 76642 ULTRASOUND BREAST LIMITED: CPT

## 2022-04-26 PROCEDURE — 76641 ULTRASOUND BREAST COMPLETE: CPT

## 2022-04-26 RX ORDER — AMOXICILLIN 500 MG/1
CAPSULE ORAL
Qty: 14 CAPSULE | Refills: 0 | OUTPATIENT
Start: 2022-04-26

## 2022-04-26 NOTE — PROGRESS NOTES
Dr. Hu Adams and RN spoke to patient regarding recommendation for breast biopsy.  Patient will think about it and will discuss this again on Wednesday or Thursday (4/27, 4/28)

## 2022-04-28 ENCOUNTER — PATIENT MESSAGE (OUTPATIENT)
Dept: FAMILY MEDICINE CLINIC | Age: 67
End: 2022-04-28

## 2022-04-28 DIAGNOSIS — N63.20 LEFT BREAST MASS: ICD-10-CM

## 2022-04-28 DIAGNOSIS — I10 ESSENTIAL HYPERTENSION: ICD-10-CM

## 2022-04-28 DIAGNOSIS — R92.8 ABNORMAL MAMMOGRAM: Primary | ICD-10-CM

## 2022-04-28 RX ORDER — CLONIDINE HYDROCHLORIDE 0.1 MG/1
TABLET ORAL
Qty: 180 TABLET | Refills: 2 | Status: SHIPPED | OUTPATIENT
Start: 2022-04-28 | End: 2022-07-20 | Stop reason: SDUPTHER

## 2022-04-28 NOTE — TELEPHONE ENCOUNTER
This is very confusing. Just call her and tell me what med she needs refilled. Will take care of it.

## 2022-04-28 NOTE — TELEPHONE ENCOUNTER
From: Sera Weaver  To: Addie Gillette  Sent: 4/28/2022 7:38 AM EDT  Subject: Clonidine    Hi, you asked about my last mammogram , all of them have been at [facility]( some at the old [address]. In 1996. My last name was karie Shukla name. 1895-26 last name was Ladarius Krishna,, first marriage. I will call [facility] today to talk to Kayden Hernandez about scheduling the breast biopsy needed. The last few aspiration of cysts, don't remember which one. It was the Dr that was in radiology that did them. I think that is what your message was.

## 2022-04-28 NOTE — TELEPHONE ENCOUNTER
From: Sera Weaver  To: Carolina Gimenez  Sent: 4/28/2022 7:51 AM EDT  Subject: NOW clonidine    You probably don't remember, but, I messed up and missed Glynn refill last month or month before. I was using mine to [facility]. Miles Thao is due on 5/1. And mine due 5/28, they say. I did use a few extra with Atenolol when my blood pressure spiked. So, now Miles Thao should be ready to fill on the 1st, but I have to call Leeanne Bowman. I just ran out last night. Its not due until 5/28. That won't do! Can you call them at [facility], order some for me? I will pay for them if I need to.  Thanks very much, Kim

## 2022-04-29 ENCOUNTER — CASE MANAGEMENT (OUTPATIENT)
Dept: WOMENS IMAGING | Age: 67
End: 2022-04-29

## 2022-04-29 DIAGNOSIS — N63.20 LEFT BREAST MASS: ICD-10-CM

## 2022-04-29 DIAGNOSIS — R92.8 ABNORMAL MAMMOGRAM: Primary | ICD-10-CM

## 2022-04-29 NOTE — PROGRESS NOTES
Updated biopsy imaging per Dylon Paiz who works in [address] with Dr Kelin Marmolejo on what specifically needs order to proceed with breast biopsy next week

## 2022-04-29 NOTE — PROGRESS NOTES
Patient called today stating that she has decided that she would like to proceed with breast biopsy (sies). Biopsy orders have been requested via fax and inbox.

## 2022-05-02 DIAGNOSIS — F41.1 GAD (GENERALIZED ANXIETY DISORDER): ICD-10-CM

## 2022-05-02 RX ORDER — LORAZEPAM 1 MG/1
TABLET ORAL
Qty: 120 TABLET | Refills: 0 | Status: SHIPPED | OUTPATIENT
Start: 2022-05-02 | End: 2022-06-06 | Stop reason: SDUPTHER

## 2022-05-04 ENCOUNTER — HOSPITAL ENCOUNTER (OUTPATIENT)
Dept: WOMENS IMAGING | Age: 67
Discharge: HOME | End: 2022-05-04
Payer: MEDICARE

## 2022-05-04 ENCOUNTER — HOSPITAL ENCOUNTER (OUTPATIENT)
Dept: ULTRASOUND IMAGING | Age: 67
Discharge: HOME | End: 2022-05-04
Payer: MEDICARE

## 2022-05-04 VITALS — DIASTOLIC BLOOD PRESSURE: 65 MMHG | SYSTOLIC BLOOD PRESSURE: 150 MMHG

## 2022-05-04 DIAGNOSIS — R92.8 ABNORMAL MAMMOGRAM: ICD-10-CM

## 2022-05-04 DIAGNOSIS — N63.20 LEFT BREAST MASS: ICD-10-CM

## 2022-05-04 PROCEDURE — 88360 TUMOR IMMUNOHISTOCHEM/MANUAL: CPT

## 2022-05-04 PROCEDURE — 88305 TISSUE EXAM BY PATHOLOGIST: CPT

## 2022-05-04 PROCEDURE — 88342 IMHCHEM/IMCYTCHM 1ST ANTB: CPT

## 2022-05-04 PROCEDURE — 2720000010 US BREAST BIOPSY W LOC DEVICE 1ST LESION LEFT

## 2022-05-04 PROCEDURE — 77065 DX MAMMO INCL CAD UNI: CPT

## 2022-05-04 PROCEDURE — 88341 IMHCHEM/IMCYTCHM EA ADD ANTB: CPT

## 2022-05-04 PROCEDURE — A4648 IMPLANTABLE TISSUE MARKER: HCPCS

## 2022-05-04 PROCEDURE — 76098 X-RAY EXAM SURGICAL SPECIMEN: CPT

## 2022-05-04 ASSESSMENT — PAIN SCALES - GENERAL
PAINLEVEL_OUTOF10: 0
PAINLEVEL_OUTOF10: 0

## 2022-05-04 NOTE — PROGRESS NOTES
Breast Biopsy Nursing Note    NAME:  Sera Weaver  YOB: 1955 GENDER: female  MEDICAL RECORD NUMBER:  [id number]  DATE:  5/4/2022     Biopsy specimen A was performed in Ultrasound, noted as 0.5  3:00 position   2CMFN  Biopsy specimen B was performed in Stereo, noted as Architectural Distortion     Breast Biopsy completed by .  Expiration date site marker checked immediately prior to use.  Package intact prior to use and no damage noted.  Site marker was removed from protective sterile packaging by physician.  Site marker was placed by physician into left     breast/s.  Hemostasis achieved via site pressure; Biopsy site cleansed with alcohol   Steri-strips applied along with small amount of bacitracin-neomycin polymixin then Coverderm    Breast Biopsy tissue was placed in proper container/s and labeled.  Breast Biopsy tissue was taken to Pathology for evaluation. Procedural Pain:  0  / 10     Post Procedural Pain:  0 / 10     Procedure well tolerated. Pt stable and alert and oriented x 3 upon discharge. Ice pack placed over biopsy site. Pt given post-biopsy education and all questions answered. Pt has NN contact info.        Electronically signed by Lawyer Amanda RN on 5/4/2022 at 11:20 AM

## 2022-05-06 ENCOUNTER — CASE MANAGEMENT (OUTPATIENT)
Dept: WOMENS IMAGING | Age: 67
End: 2022-05-06

## 2022-05-06 DIAGNOSIS — D05.12 DUCTAL CARCINOMA IN SITU (DCIS) OF LEFT BREAST: Primary | ICD-10-CM

## 2022-05-11 ENCOUNTER — OFFICE VISIT (OUTPATIENT)
Dept: BREAST CENTER | Age: 67
End: 2022-05-11
Payer: MEDICARE

## 2022-05-11 VITALS — DIASTOLIC BLOOD PRESSURE: 80 MMHG | SYSTOLIC BLOOD PRESSURE: 176 MMHG | OXYGEN SATURATION: 99 % | HEART RATE: 77 BPM

## 2022-05-11 DIAGNOSIS — D05.12 NEOPLASM OF LEFT BREAST, PRIMARY TUMOR STAGING CATEGORY TIS: DUCTAL CARCINOMA IN SITU (DCIS): Primary | ICD-10-CM

## 2022-05-11 PROCEDURE — G8428 CUR MEDS NOT DOCUMENT: HCPCS | Performed by: SURGERY

## 2022-05-11 PROCEDURE — G8399 PT W/DXA RESULTS DOCUMENT: HCPCS | Performed by: SURGERY

## 2022-05-11 PROCEDURE — 4040F PNEUMOC VAC/ADMIN/RCVD: CPT | Performed by: SURGERY

## 2022-05-11 PROCEDURE — 4004F PT TOBACCO SCREEN RCVD TLK: CPT | Performed by: SURGERY

## 2022-05-11 PROCEDURE — 3017F COLORECTAL CA SCREEN DOC REV: CPT | Performed by: SURGERY

## 2022-05-11 PROCEDURE — 1090F PRES/ABSN URINE INCON ASSESS: CPT | Performed by: SURGERY

## 2022-05-11 PROCEDURE — G9899 SCRN MAM PERF RSLTS DOC: HCPCS | Performed by: SURGERY

## 2022-05-11 PROCEDURE — 99205 OFFICE O/P NEW HI 60 MIN: CPT | Performed by: SURGERY

## 2022-05-11 PROCEDURE — G8417 CALC BMI ABV UP PARAM F/U: HCPCS | Performed by: SURGERY

## 2022-05-11 PROCEDURE — 1123F ACP DISCUSS/DSCN MKR DOCD: CPT | Performed by: SURGERY

## 2022-05-11 NOTE — PROGRESS NOTES
Subjective:      Patient ID: Luis Enrique Parks is a 73 y.o. female. HPI   Chief Complaint   Patient presents with    New Patient     consultation     Patient had a recent screening mammogram showing heterogeneously dense breast tissue and three 4 mm masses in the left lateral breast, 3:00. Ultrasound showed a small left breast mass 3:00, 2 cmfn. No axillary adenopathy. Core biopsy of the 3:00 mass was benign. 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 Patient has been on Estrace for several years. She has not felt breast masses, no nipple discharge. She has had intermittent breast pain x 4 years.      Past Medical History:   Diagnosis Date    Anxiety state, unspecified     Chronic back pain     Tried spinal injections    Depressive disorder, not elsewhere classified     Duodenal ulcer, unspecified as acute or chronic, without hemorrhage, perforation, or obstruction     Essential hypertension 2/22/2016    Fibromyalgia     GERD (gastroesophageal reflux disease)     Seasonal allergies     Sleep disorder        Past Surgical History:   Procedure Laterality Date    ABDOMINAL ADHESION SURGERY      CARDIOVASCULAR STRESS TEST  02/2016    negative    CHOLECYSTECTOMY      COLONOSCOPY  9/13    negative    DENTAL SURGERY      HIATAL HERNIA REPAIR  09/07/2018    LAPAROSCOPIC HIATAL HERNIA, LAPAROSCOPIC NISSEN FUNDOPLICATION WITH MESH    HYSTERECTOMY      ROSELIA STEROTACTIC LOC BREAST BIOPSY LEFT Left 5/4/2022    ROSELIA STEROTACTIC LOC BREAST BIOPSY LEFT 5/4/2022 [facility] [address]    OR NJX DX/THER SBST INTRLMNR LMBR/SAC W/IMG GDN N/A 11/20/2018    LUMBAR L5-S1 EPIDURAL STEROID INJECTION WITH FLUOROSCOPY performed by Levi Velazco MD at [address] [place] [address] ENDOSCOPY  9/13    small paraesoph hernia     US BREAST NEEDLE BIOPSY LEFT Left 5/4/2022    US BREAST NEEDLE BIOPSY LEFT 5/4/2022 [facility] ULTRASOUND       Current Outpatient Medications   Medication Sig Dispense Refill    LORazepam (ATIVAN) 1 MG tablet TAKE 1 TABLET BY MOUTH TWO TO THREE TIMES DAILY AS NEEDED DURING THE DAY AND 2 TABLETS EVERY NIGHT AT BEDTIME FOR A MAXIMUM OF 4 TABLETS DAILY 120 tablet 0    cloNIDine (CATAPRES) 0.1 MG tablet TAKE 1 TABLET BY MOUTH TWICE DAILY 180 tablet 2    busPIRone (BUSPAR) 5 MG tablet Take 1 tablet in the morning and 2 tablets at bedtime 270 tablet 3    dicyclomine (BENTYL) 20 MG tablet TAKE 1 TABLET BY MOUTH EVERY 6 HOURS AS NEEDED 360 tablet 0    nicotine (NICODERM CQ) 14 MG/24HR Place 1 patch onto the skin daily 30 patch 0    Alpha-Lipoic Acid 600 MG TABS Take 1 tablet by mouth in the morning and at bedtime      ibuprofen (ADVIL;MOTRIN) 400 MG tablet TAKE 1 TABLET BY MOUTH EVERY 6 HOURS AS NEEDED FOR PAIN 360 tablet 3    ezetimibe (ZETIA) 10 MG tablet Take 1 tablet by mouth daily 90 tablet 3    estradiol (ESTRACE) 0.5 MG tablet Take 1 tablet by mouth daily 90 tablet 3    atenolol (TENORMIN) 50 MG tablet Take 1 tablet by mouth daily 90 tablet 3    hydrOXYzine (ATARAX) 25 MG tablet Take 1-2 tabs every 6 hours as needed itching 40 tablet 5    tiZANidine (ZANAFLEX) 4 MG tablet TAKE 1 TABLET BY MOUTH EVERY 8 HOURS AS NEEDED- muscle cramping and pain 270 tablet 3    venlafaxine (EFFEXOR XR) 75 MG extended release capsule Take 3 capsules by mouth daily 270 capsule 3    fluticasone (FLONASE) 50 MCG/ACT nasal spray USE 2 SPRAYS IN EACH NOSTRIL EVERY DAY, SHAKE WELL 144 g 0    triamterene-hydroCHLOROthiazide (MAXZIDE-25) 37.5-25 MG per tablet Take 1 tablet by mouth daily 90 tablet 3    omeprazole (PRILOSEC) 40 MG delayed release capsule TAKE 1 CAPSULE TWICE DAILY 180 capsule 3    albuterol sulfate  (90 Base) MCG/ACT inhaler INHALE 2 PUFFS INTO THE LUNGS EVERY 6 HOURS AS NEEDED FOR WHEEZING 54 g 3    melatonin 3 MG TABS tablet Take 6-9 mg by mouth nightly as needed (sleep)       docusate sodium (COLACE) 100 MG capsule Take 100 mg by mouth 3 times daily as needed for Constipation       No current facility-administered medications for this visit. Social History     Socioeconomic History    Marital status:      Spouse name: Not on file    Number of children: Not on file    Years of education: Not on file    Highest education level: Not on file   Occupational History    Occupation: Disabled     Comment: 2010   Tobacco Use    Smoking status: Current Every Day Smoker     Packs/day: 1.00     Years: 40.00     Pack years: 40.00     Types: Cigarettes    Smokeless tobacco: Never Used   Vaping Use    Vaping Use: Former   Substance and Sexual Activity    Alcohol use: Yes     Alcohol/week: 1.0 standard drink     Types: 1 Glasses of wine per week     Comment: occasionaly    Drug use: No    Sexual activity: Not Currently   Other Topics Concern    Not on file   Social History Narrative    Not on file     Social Determinants of Health     Financial Resource Strain: Low Risk     Difficulty of Paying Living Expenses: Not hard at all   Food Insecurity: No Food Insecurity    Worried About Running Out of Food in the Last Year: Never true    [address] in the Last Year: Never true   Transportation Needs:     Lack of Transportation (Medical): Not on file    Lack of Transportation (Non-Medical):  Not on file   Physical Activity: Insufficiently Active    Days of Exercise per Week: 3 days    Minutes of Exercise per Session: 20 min   Stress:     Feeling of Stress : Not on file   Social Connections:     Frequency of Communication with Friends and Family: Not on file    Frequency of Social Gatherings with Friends and Family: Not on file    Attends Oriental orthodox Services: Not on file    Active Member of Clubs or Organizations: Not on file    Attends Club or Organization Meetings: Not on file    Marital Status: Not on file   Intimate Partner Violence:     Fear of Current or Ex-Partner: Not on file    Emotionally Abused: Not on file    Physically Abused: Not on file    Sexually Abused: Not on file   Housing Stability:     Unable to Pay for Housing in the Last Year: Not on file    Number of Places Lived in the Last Year: Not on file    Unstable Housing in the Last Year: Not on file       Objective:   Physical Exam    Bilateral breasts - Normal contour, no masses, no nipple changes. Ecchymosis post biopsy on left lateral breast.  No cervical or axillary adenopathy. Assessment:      Diagnosis Orders   1. 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)            Plan:     I reviewed her imaging with radiology and discussed findings with the patient. Discussed breast biopsy results with patient. Reviewed surgical options, including lumpectomy, mastectomy, and mastectomy with reconstruction. Recommendations were made following standard NCCN guidelines. She wishes to proceed with RFID localized left breast lumpectomy for clinical TisN0 DCIS. The indications for the planned procedure, along with the potential benefits and risks which include but are not limited to the risk of anesthesia, bleeding, infection, possible failed operation, possible need for additional surgery pending final pathologic assessment, nipple loss, lymphedema, sensation changes, nerve injury, persistent pain, and unappealing cosmetics were reviewed. The discussion I have done encompasses risks, benefits, and side effects related to the alternatives and the risks related to not receiving the proposed care, treatment, and services. All questions were answered and she agrees to proceed. Will obtain an MRI of her dense breast tissue prior to surgery to evaluate the rest of her breasts. I also recommend that she stop her Estrace.     On this date 05/11/22 I have spent 60 minutes reviewing previous notes, test results, and face to face with the patient discussing the diagnosis and importance of compliance with the treatment plan as well as documenting on the day of the visit.      Electronically signed by Chris Suazo MD on 5/11/2022 at 2:32 PM

## 2022-05-11 NOTE — PROGRESS NOTES
Breast History:  History of Previous Breast Biopsy:yes  Self Breast Exams Completed:yes-  Family History of Breast Cancer:no-    Family History of Other Cancers:yes-mom had colon cancer   Ashkenazi Jain Decent:no  Bra Size: 36/C    Gyne History:  : 0,   Para: 0  Age of Menarche: 7 years  Age of Menopause: 28 years   Age of first live Birth: 1 years  History of Hysterectomy / OLI-BSO: YES  History of OCP's/HRT:   When and how long:    Family History or personal history of Ovarian Cancer: no

## 2022-05-11 NOTE — LETTER
CONSENT TO OPERATION  AND/OR OTHER PROCEDURE(S)          PATIENT : Sera Weaver   YOB: 1955      DATE : 5/11/22          1. I request and consent that Dr. Alexandr Monetiro,  and/or his associates or assistants perform an operation and/or procedures on the above patient at  [facility] [place] [facility], to treat the condition(s) which appear indicated by the diagnostic studies already performed. I have been told that in general terms the nature, purpose and reasonable expectations of the operation and/or procedure(s) are:       ***        2. It has been explained to me by the informing physician that during the course of the operation and/or procedure(s) unforeseen conditions may be revealed that necessitate an extension of the original operation and/or procedure(s) or different operation and/or procedures than those set forth in Paragraph 1. I therefore authorize and request that my physician and/or his associates or assistants perform such operations and/or procedures as are necessary and desirable in the exercise of professional judgment. The authority granted under this Paragraph 2 shall extend to all conditions that require treatment and are known to my physician at the time the operation is commenced. 3. I have been made aware by the informing physician of certain risks and consequences that are associated with the operation and/or procedure(s) described in Paragraph 1, the reasonable alternative methods or treatment, the possible consequences, the possibility that the operation and/or procedure(s) may be unsuccessful and the possibility of complications.   I understand the reasonably known risks to be:      - Bleeding  - Infection/COVID 19  - Poor Healing  - Possible Damage to Nerve, Vessel, Tendon/Muscle or Bone  - Possible need for additional Treatment/Surgery/Re Excision  - Stiffness  - Persistent Pain   - Residual or Recurrent Symptoms  - Anesthetic and/or Medical Risks  - Possible failed operation  - Lymphedema/swelling  - Nipple loss  - Numbness or nerve injury  - Unappealing cosmetics              4. I have also been informed by the informing physician that there are other risks from both known and unknown causes that are attendant to the performance of any surgical procedure. I am aware that the practice of medicine and surgery is not an exact science, and that no guarantees have been made to me concerning the results of the operation and/or procedure(s). 5. I   CONSENT / REFUSE CONSENT  (strike the phrase that does not apply) to the taking of photographs before, during and/or after the operation or procedure for scientific/educational purposes. 6. I consent to the administration of anesthesia and to the use of such anesthetics as may be deemed advisable by the anesthesiologist who has been engaged by me or my physician. 7. I certify that I have read and understand the above consent to operation and/or other procedure(s); that the explanations therein referred to were made to me by the informing physician in advance of my signing this consent; that all blanks or statements requiring insertion or completion were filled in and inapplicable paragraphs, if any, were stricken before I signed; and that all questions asked by me about the operation and/or procedure(s) which I have consented to have been fully answered in a satisfactory manner.               _______________________  Witness        Signature Of Patient          _______________________  Informing Physician         Signature of Informing Physician           If patient is unable to sign or is a minor, complete one of the following:    (A)  Patient is a minor   years of age.     (B)  Patient is unable to sign because:

## 2022-05-12 DIAGNOSIS — D05.12 NEOPLASM OF LEFT BREAST, PRIMARY TUMOR STAGING CATEGORY TIS: DUCTAL CARCINOMA IN SITU (DCIS): Primary | ICD-10-CM

## 2022-05-13 NOTE — TELEPHONE ENCOUNTER
----- Message from Rafita Packer RN sent at 5/12/2022  2:37 PM EDT -----  Regarding: Breast MRI on 19th  Bulmaro Domínguez,    I just scheduled Sera for a breast MRI on the 19th. Course she has a complicated pain/anxiety hx but is legitimately concerned she might not be able to lay still in the position needed, face down and arms overhead with her issues. I suggested she reach out to you - to see if there's something at all she could safely take that might help her be successful with this procedure. Holding still is going to be a challenge I think, just from talking to her over the phone.    Thanks for your help Dionne Snellen

## 2022-05-13 NOTE — TELEPHONE ENCOUNTER
Instruct pt to take 2 mg ativan and 25-50 mg hydroxyzine prior to the MRI. Pt will need a ride to and from the MRI if she plans to take medication to help her relax. She has hydroxyzine script from December, let me know if she needs another sent, otherwise there were refills available on the script.

## 2022-05-16 ENCOUNTER — OFFICE VISIT (OUTPATIENT)
Dept: FAMILY MEDICINE CLINIC | Age: 67
End: 2022-05-16
Payer: MEDICARE

## 2022-05-16 VITALS
TEMPERATURE: 97.5 F | RESPIRATION RATE: 20 BRPM | HEART RATE: 88 BPM | OXYGEN SATURATION: 97 % | WEIGHT: 179 LBS | DIASTOLIC BLOOD PRESSURE: 82 MMHG | SYSTOLIC BLOOD PRESSURE: 146 MMHG | BODY MASS INDEX: 27.13 KG/M2 | HEIGHT: 68 IN

## 2022-05-16 DIAGNOSIS — D05.12 NEOPLASM OF LEFT BREAST, PRIMARY TUMOR STAGING CATEGORY TIS: DUCTAL CARCINOMA IN SITU (DCIS): Primary | ICD-10-CM

## 2022-05-16 DIAGNOSIS — Z01.818 PREOP EXAMINATION: ICD-10-CM

## 2022-05-16 PROCEDURE — G8427 DOCREV CUR MEDS BY ELIG CLIN: HCPCS | Performed by: NURSE PRACTITIONER

## 2022-05-16 PROCEDURE — 93000 ELECTROCARDIOGRAM COMPLETE: CPT | Performed by: NURSE PRACTITIONER

## 2022-05-16 PROCEDURE — G8417 CALC BMI ABV UP PARAM F/U: HCPCS | Performed by: NURSE PRACTITIONER

## 2022-05-16 PROCEDURE — 36415 COLL VENOUS BLD VENIPUNCTURE: CPT | Performed by: NURSE PRACTITIONER

## 2022-05-16 PROCEDURE — 1123F ACP DISCUSS/DSCN MKR DOCD: CPT | Performed by: NURSE PRACTITIONER

## 2022-05-16 PROCEDURE — G8399 PT W/DXA RESULTS DOCUMENT: HCPCS | Performed by: NURSE PRACTITIONER

## 2022-05-16 PROCEDURE — G9899 SCRN MAM PERF RSLTS DOC: HCPCS | Performed by: NURSE PRACTITIONER

## 2022-05-16 PROCEDURE — 3017F COLORECTAL CA SCREEN DOC REV: CPT | Performed by: NURSE PRACTITIONER

## 2022-05-16 PROCEDURE — 4004F PT TOBACCO SCREEN RCVD TLK: CPT | Performed by: NURSE PRACTITIONER

## 2022-05-16 PROCEDURE — 99214 OFFICE O/P EST MOD 30 MIN: CPT | Performed by: NURSE PRACTITIONER

## 2022-05-16 PROCEDURE — 1090F PRES/ABSN URINE INCON ASSESS: CPT | Performed by: NURSE PRACTITIONER

## 2022-05-16 PROCEDURE — 4040F PNEUMOC VAC/ADMIN/RCVD: CPT | Performed by: NURSE PRACTITIONER

## 2022-05-16 RX ORDER — BUSPIRONE HYDROCHLORIDE 5 MG/1
5 TABLET ORAL 2 TIMES DAILY
COMMUNITY
End: 2022-05-20 | Stop reason: SDUPTHER

## 2022-05-16 NOTE — PROGRESS NOTES
Preoperative Consultation      Sera Weaver  YOB: 1955    Date of Service:  5/16/2022    Vitals:    05/16/22 1435 05/16/22 1500   BP: (!) 148/84 (!) 146/82   Site: Left Upper Arm Left Upper Arm   Position: Sitting Sitting   Cuff Size: Large Adult Large Adult   Pulse: 88    Resp: 20    Temp: 97.5 °F (36.4 °C)    TempSrc: Temporal    SpO2: 97%    Weight: 179 lb (81.2 kg)    Height: 5' 8\" (1.727 m)       Wt Readings from Last 2 Encounters:   05/16/22 179 lb (81.2 kg)   03/01/22 184 lb 3.2 oz (83.6 kg)     BP Readings from Last 3 Encounters:   05/16/22 (!) 146/82   05/11/22 (!) 176/80   05/04/22 (!) 150/65        Chief Complaint   Patient presents with   [address] [place] [address] Timo Pre-op Exam     MRI scheduled  BLOOD WORK     Allergies   Allergen Reactions    Macrodantin [Nitrofurantoin]      fever    Morphine      headache    Cipro Xr Nausea And Vomiting     If given iv, can not take it orally     Outpatient Medications Marked as Taking for the 5/16/22 encounter (Office Visit) with GRIS Manzano CNP   Medication Sig Dispense Refill    busPIRone (BUSPAR) 5 MG tablet Take 5 mg by mouth 2 times daily      LORazepam (ATIVAN) 1 MG tablet TAKE 1 TABLET BY MOUTH TWO TO THREE TIMES DAILY AS NEEDED DURING THE DAY AND 2 TABLETS EVERY NIGHT AT BEDTIME FOR A MAXIMUM OF 4 TABLETS DAILY 120 tablet 0    cloNIDine (CATAPRES) 0.1 MG tablet TAKE 1 TABLET BY MOUTH TWICE DAILY 180 tablet 2    dicyclomine (BENTYL) 20 MG tablet TAKE 1 TABLET BY MOUTH EVERY 6 HOURS AS NEEDED 360 tablet 0    Alpha-Lipoic Acid 600 MG TABS Take 1 tablet by mouth in the morning and at bedtime      ibuprofen (ADVIL;MOTRIN) 400 MG tablet TAKE 1 TABLET BY MOUTH EVERY 6 HOURS AS NEEDED FOR PAIN 360 tablet 3    ezetimibe (ZETIA) 10 MG tablet Take 1 tablet by mouth daily 90 tablet 3    estradiol (ESTRACE) 0.5 MG tablet Take 1 tablet by mouth daily 90 tablet 3    atenolol (TENORMIN) 50 MG tablet Take 1 tablet by mouth daily 90 tablet 3    hydrOXYzine (ATARAX) 25 MG tablet Take 1-2 tabs every 6 hours as needed itching 40 tablet 5    tiZANidine (ZANAFLEX) 4 MG tablet TAKE 1 TABLET BY MOUTH EVERY 8 HOURS AS NEEDED- muscle cramping and pain 270 tablet 3    venlafaxine (EFFEXOR XR) 75 MG extended release capsule Take 3 capsules by mouth daily 270 capsule 3    fluticasone (FLONASE) 50 MCG/ACT nasal spray USE 2 SPRAYS IN EACH NOSTRIL EVERY DAY, SHAKE WELL 144 g 0    triamterene-hydroCHLOROthiazide (MAXZIDE-25) 37.5-25 MG per tablet Take 1 tablet by mouth daily 90 tablet 3    omeprazole (PRILOSEC) 40 MG delayed release capsule TAKE 1 CAPSULE TWICE DAILY 180 capsule 3    albuterol sulfate  (90 Base) MCG/ACT inhaler INHALE 2 PUFFS INTO THE LUNGS EVERY 6 HOURS AS NEEDED FOR WHEEZING 54 g 3    melatonin 3 MG TABS tablet Take 6-9 mg by mouth nightly as needed (sleep)       docusate sodium (COLACE) 100 MG capsule Take 100 mg by mouth 3 times daily as needed for Constipation         This patient presents to the office today for a preoperative consultation at the request of surgeon, Dr. Jacquie Zhang, who plans on performing RFID localized left breast lumpectomy on a future date yet determined at [address] [place] [address] is to get an MRI on 5/19 and determine intervention date from there. The current problem began 1 month ago, and symptoms have been unchanged with time. Found on mammogram. Biopsy showed DCIS.  Conservative therapy: No.    Planned anesthesia: General   Known anesthesia problems: None   Bleeding risk: No recent or remote history of abnormal bleeding  Personal or FH of DVT/PE: No    Patient objection to receiving blood products: No    Patient Active Problem List   Diagnosis    Depression with anxiety    Fibromyalgia    Fatigue    Cigarette smoker    Essential hypertension    Mixed hyperlipidemia    Lumbosacral spondylosis with radiculopathy    Chronic back pain    Medical marijuana use    Gastroesophageal reflux disease without esophagitis    Primary insomnia    Sinus tachycardia    Other chronic sinusitis    Erythromelalgia (HCC)    Closed compression fracture of L1 vertebra (HCC)    Elevated alkaline phosphatase level    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)       Past Medical History:   Diagnosis Date    Anxiety state, unspecified     Chronic back pain     Tried spinal injections    Depressive disorder, not elsewhere classified     Duodenal ulcer, unspecified as acute or chronic, without hemorrhage, perforation, or obstruction     Essential hypertension 2/22/2016    Fibromyalgia     GERD (gastroesophageal reflux disease)     Seasonal allergies     Sleep disorder      Past Surgical History:   Procedure Laterality Date    ABDOMINAL ADHESION SURGERY      CARDIOVASCULAR STRESS TEST  02/2016    negative    CHOLECYSTECTOMY      COLONOSCOPY  9/13    negative    DENTAL SURGERY      HIATAL HERNIA REPAIR  09/07/2018    LAPAROSCOPIC HIATAL HERNIA, LAPAROSCOPIC NISSEN FUNDOPLICATION WITH MESH    HYSTERECTOMY      ROSELIA STEROTACTIC LOC BREAST BIOPSY LEFT Left 5/4/2022    ROSELIA STEROTACTIC LOC BREAST BIOPSY LEFT 5/4/2022 [facility] [address]    ID NJX DX/THER SBST INTRLMNR LMBR/SAC W/IMG GDN N/A 11/20/2018    LUMBAR L5-S1 EPIDURAL STEROID INJECTION WITH FLUOROSCOPY performed by Emory Cuevas MD at [address] [place] [address] ENDOSCOPY  9/13    small paraesoph hernia     US BREAST NEEDLE BIOPSY LEFT Left 5/4/2022    US BREAST NEEDLE BIOPSY LEFT 5/4/2022 [facility]     Family History   Problem Relation Age of Onset    Heart Disease Mother     Other Mother     Stroke Father     Colon Cancer Father      Social History     Socioeconomic History    Marital status:      Spouse name: Not on file    Number of children: Not on file    Years of education: Not on file    Highest education level: Not on file   Occupational History    Occupation: Disabled     Comment: 2010   Tobacco Use    Smoking status: Current Every Day Smoker     Packs/day: 1.00     Years: 40.00     Pack years: 40.00     Types: Cigarettes    Smokeless tobacco: Never Used   Vaping Use    Vaping Use: Former   Substance and Sexual Activity    Alcohol use: Yes     Alcohol/week: 1.0 standard drink     Types: 1 Glasses of wine per week     Comment: occasionaly    Drug use: No    Sexual activity: Not Currently   Other Topics Concern    Not on file   Social History Narrative    Not on file     Social Determinants of Health     Financial Resource Strain: Low Risk     Difficulty of Paying Living Expenses: Not hard at all   Food Insecurity: No Food Insecurity    Worried About Running Out of Food in the Last Year: Never true    [address] in the Last Year: Never true   Transportation Needs:     Lack of Transportation (Medical): Not on file    Lack of Transportation (Non-Medical):  Not on file   Physical Activity: Insufficiently Active    Days of Exercise per Week: 3 days    Minutes of Exercise per Session: 20 min   Stress:     Feeling of Stress : Not on file   Social Connections:     Frequency of Communication with Friends and Family: Not on file    Frequency of Social Gatherings with Friends and Family: Not on file    Attends Baptism Services: Not on file    Active Member of Clubs or Organizations: Not on file    Attends Club or Organization Meetings: Not on file    Marital Status: Not on file   Intimate Partner Violence:     Fear of Current or Ex-Partner: Not on file    Emotionally Abused: Not on file    Physically Abused: Not on file    Sexually Abused: Not on file   Housing Stability:     Unable to Pay for Housing in the Last Year: Not on file    Number of [place] in the Last Year: Not on file    Unstable Housing in the Last Year: Not on file       Review of Systems  A comprehensive review of systems was negative except for what was noted in the HPI. Physical Exam   Constitutional: She is oriented to person, place, and time. She appears well-developed and well-nourished. No distress. HENT:   Head: Normocephalic and atraumatic. Mouth/Throat: Uvula is midline, oropharynx is clear and moist and mucous membranes are normal.   Eyes: Conjunctivae and EOM are normal. Pupils are equal, round, and reactive to light. Neck: Trachea normal and normal range of motion. Neck supple. No JVD present. Carotid bruit is not present. No mass and no thyromegaly present. Cardiovascular: Normal rate, regular rhythm, normal heart sounds and intact distal pulses. Exam reveals no gallop and no friction rub. No murmur heard. Pulmonary/Chest: Effort normal and breath sounds normal. No respiratory distress. She has no wheezes. She has no rales. Abdominal: Soft. Normal aorta and bowel sounds are normal. She exhibits no distension and no mass. There is no hepatosplenomegaly. No tenderness. Musculoskeletal: She exhibits no edema and no tenderness. Neurological: She is alert and oriented to person, place, and time. She has normal strength. No cranial nerve deficit or sensory deficit. Coordination and gait normal.   Skin: Skin is warm and dry. No rash noted. No erythema. Psychiatric: She has a normal mood and affect. Her behavior is normal.     EKG Interpretation:  normal EKG, normal sinus rhythm with significant artifact. When compared to an EKG dated 1/15/2018, there are no changes.     Lab Review   Office Visit on 12/01/2021   Component Date Value    Alcohol, Urine 12/01/2021 n/a     Amphetamine Screen, Urine 12/01/2021 negative     Barbiturate Screen, Urine 12/01/2021 negative     Benzodiazepine Screen, U* 12/01/2021 positive     Buprenorphine Urine 12/01/2021 negative     Cocaine Metabolite Scree* 12/01/2021 negative     FENTANYL SCREEN, URINE 12/01/2021 n/a     Gabapentin Screen, Urine 12/01/2021 n/a     MDMA, Urine 12/01/2021 negative     Methadone Screen, Urine 12/01/2021 negative     Methamphetamine, Urine 12/01/2021 negative     Opiate Scrn, Ur 12/01/2021 negative     Oxycodone Screen, Ur 12/01/2021 negative     PCP Screen, Urine 12/01/2021 negative     Propoxyphene Screen, Uri* 12/01/2021 n/a     Synthetic Cannabinoids (* 12/01/2021 n/a     THC Screen, Urine 12/01/2021 negative     Tramadol Scrn, Ur 79/74/4102 n/a     Tricyclic Antidepressant* 27/08/5721 negative     Cholesterol, Total 12/01/2021 267*    Triglycerides 12/01/2021 237*    HDL 12/01/2021 76*    LDL Calculated 12/01/2021 144*    VLDL Cholesterol Calcula* 12/01/2021 47     Hemoglobin A1C 12/01/2021 5.5     eAG 12/01/2021 111.2     Sodium 12/01/2021 129*    Potassium 12/01/2021 4.9     Chloride 12/01/2021 89*    CO2 12/01/2021 28     Anion Gap 12/01/2021 12     Glucose 12/01/2021 98     BUN 12/01/2021 11     CREATININE 12/01/2021 0.7     GFR Non- 12/01/2021 >60     GFR  12/01/2021 >60     Calcium 12/01/2021 10.0     Total Protein 12/01/2021 7.5     Albumin 12/01/2021 4.9     Albumin/Globulin Ratio 12/01/2021 1.9     Total Bilirubin 12/01/2021 0.3     Alkaline Phosphatase 12/01/2021 125     ALT 12/01/2021 16     AST 12/01/2021 19            Assessment:       73 y.o. patient with planned surgery as above. Known risk factors for perioperative complications: Hypertension, Tobacco abuse  Current medications which may produce withdrawal symptoms if withheld perioperatively: Lorazepam      Plan:   1. 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 Preop examination  1. Preoperative workup as follows: ECG, hemoglobin, hematocrit, electrolytes, creatinine, glucose, liver function studies  2.  Change in medication regimen before surgery: Discontinue NSAIDs (ibuprofen/aleve) 7 days before surgery, Discontinue krill oil 7 days before surgery  3. Prophylaxis for cardiac events with perioperative beta-blockers: Currently taking  atenolol  ACC/AHA indications for pre-operative beta-blocker use:    · Vascular surgery with history of postitive stress test  · Intermediate or high risk surgery with history of CAD   · Intermediate or high risk surgery with multiple clinical predictors of CAD- 2 of the following: history of compensated or prior heart failure, history of cerebrovascular disease, DM, or renal insufficiency    Routine administration of higher-dose, long-acting metoprolol in beta-blocker-naïve patients on the day of surgery, and in the absence of dose titration is associated with an overall increase in mortality. Beta-blockers should be started days to weeks prior to surgery and titrated to pulse < 70.  4. Deep vein thrombosis prophylaxis: regimen to be chosen by surgical team  5. No contraindications to planned surgery    This dictation was generated by voice recognition computer software. Although all attempts are made to edit the dictation for accuracy, there may be errors in the transcription that are not intended. An electronic signature was used to authenticate this note.     --Diann Khoury, GRIS - CNP

## 2022-05-17 ENCOUNTER — TELEPHONE (OUTPATIENT)
Dept: SURGERY | Age: 67
End: 2022-05-17

## 2022-05-17 DIAGNOSIS — D05.12 DUCTAL CARCINOMA IN SITU OF LEFT BREAST: Primary | ICD-10-CM

## 2022-05-17 LAB
A/G RATIO: 2 (ref 1.1–2.2)
ALBUMIN SERPL-MCNC: 5.1 G/DL (ref 3.4–5)
ALP BLD-CCNC: 142 U/L (ref 40–129)
ALT SERPL-CCNC: 16 U/L (ref 10–40)
ANION GAP SERPL CALCULATED.3IONS-SCNC: 16 MMOL/L (ref 3–16)
AST SERPL-CCNC: 19 U/L (ref 15–37)
BASOPHILS ABSOLUTE: 0 K/UL (ref 0–0.2)
BASOPHILS RELATIVE PERCENT: 0.7 %
BILIRUB SERPL-MCNC: <0.2 MG/DL (ref 0–1)
BUN BLDV-MCNC: 6 MG/DL (ref 7–20)
CALCIUM SERPL-MCNC: 9.9 MG/DL (ref 8.3–10.6)
CHLORIDE BLD-SCNC: 91 MMOL/L (ref 99–110)
CO2: 27 MMOL/L (ref 21–32)
CREAT SERPL-MCNC: 0.7 MG/DL (ref 0.6–1.2)
EOSINOPHILS ABSOLUTE: 0.1 K/UL (ref 0–0.6)
EOSINOPHILS RELATIVE PERCENT: 1.7 %
GFR AFRICAN AMERICAN: >60
GFR NON-AFRICAN AMERICAN: >60
GLUCOSE BLD-MCNC: 111 MG/DL (ref 70–99)
HCT VFR BLD CALC: 41.6 % (ref 36–48)
HEMOGLOBIN: 14.1 G/DL (ref 12–16)
LYMPHOCYTES ABSOLUTE: 2.4 K/UL (ref 1–5.1)
LYMPHOCYTES RELATIVE PERCENT: 44.1 %
MCH RBC QN AUTO: 31.9 PG (ref 26–34)
MCHC RBC AUTO-ENTMCNC: 33.8 G/DL (ref 31–36)
MCV RBC AUTO: 94.4 FL (ref 80–100)
MONOCYTES ABSOLUTE: 0.5 K/UL (ref 0–1.3)
MONOCYTES RELATIVE PERCENT: 8.5 %
NEUTROPHILS ABSOLUTE: 2.4 K/UL (ref 1.7–7.7)
NEUTROPHILS RELATIVE PERCENT: 45 %
PDW BLD-RTO: 13.6 % (ref 12.4–15.4)
PLATELET # BLD: 258 K/UL (ref 135–450)
PMV BLD AUTO: 8.3 FL (ref 5–10.5)
POTASSIUM SERPL-SCNC: 4.7 MMOL/L (ref 3.5–5.1)
RBC # BLD: 4.41 M/UL (ref 4–5.2)
SODIUM BLD-SCNC: 134 MMOL/L (ref 136–145)
TOTAL PROTEIN: 7.6 G/DL (ref 6.4–8.2)
WBC # BLD: 5.4 K/UL (ref 4–11)

## 2022-05-19 ENCOUNTER — PATIENT MESSAGE (OUTPATIENT)
Dept: BREAST CENTER | Age: 67
End: 2022-05-19

## 2022-05-19 ENCOUNTER — HOSPITAL ENCOUNTER (OUTPATIENT)
Dept: MRI IMAGING | Age: 67
Discharge: HOME | End: 2022-05-19
Payer: MEDICARE

## 2022-05-19 DIAGNOSIS — D05.12 NEOPLASM OF LEFT BREAST, PRIMARY TUMOR STAGING CATEGORY TIS: DUCTAL CARCINOMA IN SITU (DCIS): ICD-10-CM

## 2022-05-19 PROCEDURE — A9577 INJ MULTIHANCE: HCPCS | Performed by: SURGERY

## 2022-05-19 PROCEDURE — 77049 MRI BREAST C-+ W/CAD BI: CPT

## 2022-05-19 PROCEDURE — 6360000004 HC RX CONTRAST MEDICATION: Performed by: SURGERY

## 2022-05-19 PROCEDURE — 2580000003 HC RX 258: Performed by: SURGERY

## 2022-05-19 RX ORDER — SODIUM CHLORIDE 9 MG/ML
INJECTION, SOLUTION INTRAVENOUS ONCE
Status: COMPLETED | OUTPATIENT
Start: 2022-05-19 | End: 2022-05-19

## 2022-05-19 RX ADMIN — GADOBENATE DIMEGLUMINE 17 ML: 529 INJECTION, SOLUTION INTRAVENOUS at 15:38

## 2022-05-19 RX ADMIN — SODIUM CHLORIDE: 9 INJECTION, SOLUTION INTRAVENOUS at 15:39

## 2022-05-19 NOTE — TELEPHONE ENCOUNTER
From: Sera Weaver  To: Dr. Douglas Overcast: 5/19/2022 11:46 AM EDT  Subject: Bra    I think I told you I wear size 36. It is 38.

## 2022-05-20 ENCOUNTER — CASE MANAGEMENT (OUTPATIENT)
Dept: WOMENS IMAGING | Age: 67
End: 2022-05-20

## 2022-05-20 ENCOUNTER — PATIENT MESSAGE (OUTPATIENT)
Dept: FAMILY MEDICINE CLINIC | Age: 67
End: 2022-05-20

## 2022-05-20 DIAGNOSIS — R92.8 ABNORMAL MAGNETIC RESONANCE IMAGING OF BREAST, BILATERAL: Primary | ICD-10-CM

## 2022-05-20 DIAGNOSIS — F41.1 GAD (GENERALIZED ANXIETY DISORDER): Primary | ICD-10-CM

## 2022-05-20 RX ORDER — BUSPIRONE HYDROCHLORIDE 10 MG/1
10 TABLET ORAL 2 TIMES DAILY
Qty: 180 TABLET | Refills: 3 | Status: SHIPPED | OUTPATIENT
Start: 2022-05-20 | End: 2022-07-06 | Stop reason: SDUPTHER

## 2022-05-20 NOTE — TELEPHONE ENCOUNTER
From: Sera Weaver  To: Tamie Santana  Sent: 5/20/2022 12:57 PM EDT  Subject: Buspar    Also, went to 10 mg buspar am and pm. It seems to be doing well. Even with all of these visits, elizabeth and back, attitude is pretty good! I told Dr López Weldon on Tuesday last. He said that was ok.

## 2022-05-20 NOTE — TELEPHONE ENCOUNTER
From: Sera Weaver  To: Anshul Blackwell  Sent: 5/20/2022 12:51 PM EDT  Subject: Estradiol    I stopped the estradiol last week. Dr Axel Zacarias told me to.

## 2022-05-20 NOTE — PROGRESS NOTES
EDD DIXONM for Kim so that an appt can be arranged for additional US imaging and possible biopsy based on radiologist's recommendations. Will attempt to reach her.

## 2022-05-24 ENCOUNTER — HOSPITAL ENCOUNTER (OUTPATIENT)
Dept: WOMENS IMAGING | Age: 67
Discharge: HOME | End: 2022-05-24
Payer: MEDICARE

## 2022-05-24 ENCOUNTER — HOSPITAL ENCOUNTER (OUTPATIENT)
Dept: ULTRASOUND IMAGING | Age: 67
Discharge: HOME | End: 2022-05-24
Payer: MEDICARE

## 2022-05-24 VITALS — WEIGHT: 179 LBS | HEIGHT: 69 IN | BODY MASS INDEX: 26.51 KG/M2

## 2022-05-24 DIAGNOSIS — R92.8 ABNORMAL MAGNETIC RESONANCE IMAGING OF BREAST, BILATERAL: ICD-10-CM

## 2022-05-24 DIAGNOSIS — R92.8 ABNORMAL MAMMOGRAM: ICD-10-CM

## 2022-05-24 DIAGNOSIS — Z98.890 STATUS POST BIOPSY: ICD-10-CM

## 2022-05-24 PROCEDURE — A4648 IMPLANTABLE TISSUE MARKER: HCPCS

## 2022-05-24 PROCEDURE — 77065 DX MAMMO INCL CAD UNI: CPT

## 2022-05-24 PROCEDURE — G0279 TOMOSYNTHESIS, MAMMO: HCPCS

## 2022-05-24 PROCEDURE — 88305 TISSUE EXAM BY PATHOLOGIST: CPT

## 2022-05-24 PROCEDURE — 76642 ULTRASOUND BREAST LIMITED: CPT

## 2022-05-24 PROCEDURE — 2500000003 HC RX 250 WO HCPCS: Performed by: SURGERY

## 2022-05-24 PROCEDURE — 88342 IMHCHEM/IMCYTCHM 1ST ANTB: CPT

## 2022-05-24 PROCEDURE — 2709999900 US BREAST BIOPSY W LOC DEVICE 1ST LESION RIGHT

## 2022-05-24 RX ORDER — LIDOCAINE HYDROCHLORIDE 10 MG/ML
5 INJECTION, SOLUTION EPIDURAL; INFILTRATION; INTRACAUDAL; PERINEURAL ONCE
Status: COMPLETED | OUTPATIENT
Start: 2022-05-24 | End: 2022-05-24

## 2022-05-24 RX ORDER — LIDOCAINE HYDROCHLORIDE AND EPINEPHRINE 10; 10 MG/ML; UG/ML
20 INJECTION, SOLUTION INFILTRATION; PERINEURAL ONCE
Status: COMPLETED | OUTPATIENT
Start: 2022-05-24 | End: 2022-05-24

## 2022-05-24 RX ADMIN — LIDOCAINE HYDROCHLORIDE,EPINEPHRINE BITARTRATE 20 ML: 10; .01 INJECTION, SOLUTION INFILTRATION; PERINEURAL at 14:28

## 2022-05-24 RX ADMIN — LIDOCAINE HYDROCHLORIDE 5 ML: 10 INJECTION, SOLUTION EPIDURAL; INFILTRATION; INTRACAUDAL; PERINEURAL at 14:53

## 2022-05-24 RX ADMIN — LIDOCAINE HYDROCHLORIDE 5 ML: 10 INJECTION, SOLUTION EPIDURAL; INFILTRATION; INTRACAUDAL; PERINEURAL at 14:27

## 2022-05-24 RX ADMIN — LIDOCAINE HYDROCHLORIDE 5 ML: 10 INJECTION, SOLUTION EPIDURAL; INFILTRATION; INTRACAUDAL; PERINEURAL at 14:25

## 2022-05-24 RX ADMIN — LIDOCAINE HYDROCHLORIDE 5 ML: 10 INJECTION, SOLUTION EPIDURAL; INFILTRATION; INTRACAUDAL; PERINEURAL at 14:36

## 2022-05-24 RX ADMIN — LIDOCAINE HYDROCHLORIDE,EPINEPHRINE BITARTRATE 20 ML: 10; .01 INJECTION, SOLUTION INFILTRATION; PERINEURAL at 14:47

## 2022-05-24 ASSESSMENT — PAIN DESCRIPTION - LOCATION
LOCATION: BREAST
LOCATION: BREAST

## 2022-05-24 ASSESSMENT — PAIN DESCRIPTION - DESCRIPTORS
DESCRIPTORS: DISCOMFORT
DESCRIPTORS: DISCOMFORT

## 2022-05-24 ASSESSMENT — PAIN - FUNCTIONAL ASSESSMENT
PAIN_FUNCTIONAL_ASSESSMENT: ACTIVITIES ARE NOT PREVENTED
PAIN_FUNCTIONAL_ASSESSMENT: ACTIVITIES ARE NOT PREVENTED

## 2022-05-24 ASSESSMENT — PAIN SCALES - GENERAL
PAINLEVEL_OUTOF10: 3
PAINLEVEL_OUTOF10: 3

## 2022-05-24 ASSESSMENT — PAIN DESCRIPTION - ORIENTATION
ORIENTATION: LEFT
ORIENTATION: RIGHT

## 2022-05-24 NOTE — PROGRESS NOTES
Patient here for MRI follow-up breast with possible US biopsy. Right ultrasound-guided biopsy x 2 and Left ultrasound guided biopsy x2 was performed. NN reviewed the health history, allergies and medications. Patient drove herself. Radiologist reviews procedure with patient, consent signed. Patient tolerates procedure well. Compression held. Site cleansed with chloraprep, steri strips and dry dressing applied. Ice pack in place. Reviewed discharge instructions with patient and signed copy. Patient verbalized understanding and agreed to contact Nurse Navigator with any questions. Patient A&Ox3, steady on feet and discharged home.

## 2022-05-25 NOTE — PROGRESS NOTES
[address] [facility] time_0815___________        Surgery time__0950__________    Take the following medications with a sip of water: Follow your MD/Surgeons pre-procedure instructions regarding your medications     Do not eat or drink anything after 12:00 midnight prior to your surgery. This includes water chewing gum, mints and ice chips. You may brush your teeth and gargle the morning of your surgery, but do not swallow the water     Please see your family doctor/pediatrician for a history and physical and/or concerning medications. Bring any test results/reports from your physicians office. If you are under the care of a heart doctor or specialist doctor, please be aware that you may be asked to them for clearance    You may be asked to stop blood thinners such as Coumadin, Plavix, Fragmin, Lovenox, etc., or any anti-inflammatories such as:  Aspirin, Ibuprofen, Advil, Naproxen prior to your surgery. We also ask that you stop any OTC medications such as fish oil, vitamin E, glucosamine, garlic, Multivitamins, COQ 10, etc.    We ask that you do not smoke 24 hours prior to surgery  We ask that you do not  drink any alcoholic beverages 24 hours prior to surgery     You must make arrangements for a responsible adult to take you home after your surgery. For your safety you will not be allowed to leave alone or drive yourself home. Your surgery will be cancelled if you do not have a ride home. Also for your safety, it is strongly suggested that someone stay with you the first 24 hours after your surgery. A parent or legal guardian must accompany a child scheduled for surgery and plan to stay at the hospital until the child is discharged. Please do not bring other children with you. For your comfort, please wear simple loose fitting clothing to the hospital.  Please do not bring valuables.     Do not wear any make-up or nail polish on your fingers or toes      For your safety, please do not wear any jewelry or body piercing's on the day of surgery. All jewelry must be removed. If you have dentures, they will be removed before going to operating room. For your convenience, we will provide you with a container. If you wear contact lenses or glasses, they will be removed, please bring a case for them. If you have a living will and a durable power of  for healthcare, please bring in a copy. As part of our patient safety program to minimize surgical site infections, we ask you to do the following:    · Please notify your surgeon if you develop any illness between         now and the  day of your surgery. · This includes a cough, cold, fever, sore throat, nausea,         or vomiting, and diarrhea, etc.  ·  Please notify your surgeon if you experience dizziness, shortness         of breath or blurred vision between now and the time of your surgery. Do not shave your operative site 96 hours prior to surgery. For face and neck surgery, men may use an electric razor 48 hours   prior to surgery. You may shower the night before surgery or the morning of   your surgery with an antibacterial soap. You will need to bring a photo ID and insurance card    [facility] has an onsite pharmacy, would you like to utilize our pharmacy     If you will be staying overnight and use a C-pap machine, please bring   your C-pap to hospital     Our goal is to provide you with excellent care, therefore, visitors will be limited to two(2) in the room at a time so that we may focus on providing this care for you. Please contact pre-admission testing if you have any further questions. [facility] phone number:  [phone] [facility] fax number:  [phone]  Please note these are generalized instructions for all surgical cases, you may be provided with more specific instructions according to your surgery.     C-Difficile admission screening and protocol:       * Admitted with diarrhea? [] YES    [x]  NO     *Prior history of C-Diff. In last 3 months? [] YES    [x]  NO     *Antibiotic use in the past 6-8 weeks? [x]  NO    []  YES                 If yes, which ANTIBIOTIC AND REASON______     *Prior hospitalization or nursing home in the last month? []  YES    [x]  NO        SAFETY FIRST. .call before you fall

## 2022-05-27 ENCOUNTER — FOLLOWUP TELEPHONE ENCOUNTER (OUTPATIENT)
Dept: WOMENS IMAGING | Age: 67
End: 2022-05-27

## 2022-05-27 DIAGNOSIS — R92.8 ABNORMAL MAMMOGRAM OF RIGHT BREAST: Primary | ICD-10-CM

## 2022-05-27 NOTE — TELEPHONE ENCOUNTER
Nurse Navigator reviewed results of all four US breast biopsies, and she was called by Dr King Moreno also. 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 NN reviewed radiologist follow up recommendations as concordant, with surgical consultation. In addition, there is an area on right breast being recommended for stereotactic bx which is scheduled for 6/1/22 at [facility]. Pt verbalized understanding and NN also spoke with Dr King Moreno about status and need to change surgery.

## 2022-05-30 DIAGNOSIS — F41.1 GAD (GENERALIZED ANXIETY DISORDER): ICD-10-CM

## 2022-05-31 ENCOUNTER — TELEPHONE (OUTPATIENT)
Dept: BREAST CENTER | Age: 67
End: 2022-05-31

## 2022-05-31 RX ORDER — BUSPIRONE HYDROCHLORIDE 5 MG/1
TABLET ORAL
Qty: 180 TABLET | Refills: 0 | OUTPATIENT
Start: 2022-05-31

## 2022-05-31 NOTE — TELEPHONE ENCOUNTER
Left voicemail letting patient know surgery was moved from 6/2/22 to 6/16/22 due to patient having a biopsy on 6/1/22.  I told patient to call back and confirm the new surgery date and time

## 2022-06-01 ENCOUNTER — HOSPITAL ENCOUNTER (OUTPATIENT)
Dept: WOMENS IMAGING | Age: 67
Discharge: HOME | End: 2022-06-01
Payer: MEDICARE

## 2022-06-01 DIAGNOSIS — R92.8 ABNORMAL MAMMOGRAM: ICD-10-CM

## 2022-06-01 DIAGNOSIS — R92.8 ABNORMAL MAMMOGRAM OF RIGHT BREAST: ICD-10-CM

## 2022-06-01 PROCEDURE — 76098 X-RAY EXAM SURGICAL SPECIMEN: CPT

## 2022-06-01 PROCEDURE — 2500000003 HC RX 250 WO HCPCS: Performed by: SURGERY

## 2022-06-01 PROCEDURE — 88305 TISSUE EXAM BY PATHOLOGIST: CPT

## 2022-06-01 PROCEDURE — 2709999900 MAM STEREO BREAST BX W LOC DEVICE 1ST LESION RIGHT

## 2022-06-01 PROCEDURE — 88341 IMHCHEM/IMCYTCHM EA ADD ANTB: CPT

## 2022-06-01 PROCEDURE — 88360 TUMOR IMMUNOHISTOCHEM/MANUAL: CPT

## 2022-06-01 PROCEDURE — 88342 IMHCHEM/IMCYTCHM 1ST ANTB: CPT

## 2022-06-01 PROCEDURE — 77065 DX MAMMO INCL CAD UNI: CPT

## 2022-06-01 RX ORDER — LIDOCAINE HYDROCHLORIDE 10 MG/ML
5 INJECTION, SOLUTION EPIDURAL; INFILTRATION; INTRACAUDAL; PERINEURAL ONCE
Status: COMPLETED | OUTPATIENT
Start: 2022-06-01 | End: 2022-06-01

## 2022-06-01 RX ORDER — LIDOCAINE HYDROCHLORIDE AND EPINEPHRINE 10; 10 MG/ML; UG/ML
20 INJECTION, SOLUTION INFILTRATION; PERINEURAL ONCE
Status: COMPLETED | OUTPATIENT
Start: 2022-06-01 | End: 2022-06-01

## 2022-06-01 RX ADMIN — LIDOCAINE HYDROCHLORIDE,EPINEPHRINE BITARTRATE 20 ML: 10; .01 INJECTION, SOLUTION INFILTRATION; PERINEURAL at 14:51

## 2022-06-01 RX ADMIN — LIDOCAINE HYDROCHLORIDE 5 ML: 10 INJECTION, SOLUTION EPIDURAL; INFILTRATION; INTRACAUDAL; PERINEURAL at 14:50

## 2022-06-01 NOTE — PROGRESS NOTES
Patient here for right breast stereotactic biopsy. NN reviewed the health history, allergies and medications. Nothing to hold. Patient drove herself. Radiologist reviews procedure with patient, consent signed. Patient tolerates procedure well. Compression held. Site cleansed with chloraprep, steri strips and dry dressing applied. Ice pack in place. Reviewed discharge instructions with patient and signed copy. Patient verbalized understanding and agreed to contact Nurse Navigator with any questions. Patient A&Ox3, steady on feet and discharged home.

## 2022-06-06 ENCOUNTER — OFFICE VISIT (OUTPATIENT)
Dept: FAMILY MEDICINE CLINIC | Age: 67
End: 2022-06-06
Payer: MEDICARE

## 2022-06-06 ENCOUNTER — TELEPHONE (OUTPATIENT)
Dept: WOMENS IMAGING | Age: 67
End: 2022-06-06

## 2022-06-06 ENCOUNTER — PATIENT MESSAGE (OUTPATIENT)
Dept: FAMILY MEDICINE CLINIC | Age: 67
End: 2022-06-06

## 2022-06-06 VITALS
HEIGHT: 69 IN | HEART RATE: 86 BPM | OXYGEN SATURATION: 97 % | BODY MASS INDEX: 26.22 KG/M2 | WEIGHT: 177 LBS | DIASTOLIC BLOOD PRESSURE: 80 MMHG | SYSTOLIC BLOOD PRESSURE: 138 MMHG | RESPIRATION RATE: 20 BRPM

## 2022-06-06 DIAGNOSIS — I73.81 ERYTHROMELALGIA (HCC): ICD-10-CM

## 2022-06-06 DIAGNOSIS — D05.12 NEOPLASM OF LEFT BREAST, PRIMARY TUMOR STAGING CATEGORY TIS: DUCTAL CARCINOMA IN SITU (DCIS): ICD-10-CM

## 2022-06-06 DIAGNOSIS — F41.1 GAD (GENERALIZED ANXIETY DISORDER): Primary | ICD-10-CM

## 2022-06-06 PROCEDURE — G9899 SCRN MAM PERF RSLTS DOC: HCPCS | Performed by: NURSE PRACTITIONER

## 2022-06-06 PROCEDURE — G8417 CALC BMI ABV UP PARAM F/U: HCPCS | Performed by: NURSE PRACTITIONER

## 2022-06-06 PROCEDURE — 1090F PRES/ABSN URINE INCON ASSESS: CPT | Performed by: NURSE PRACTITIONER

## 2022-06-06 PROCEDURE — 3017F COLORECTAL CA SCREEN DOC REV: CPT | Performed by: NURSE PRACTITIONER

## 2022-06-06 PROCEDURE — G8399 PT W/DXA RESULTS DOCUMENT: HCPCS | Performed by: NURSE PRACTITIONER

## 2022-06-06 PROCEDURE — 1123F ACP DISCUSS/DSCN MKR DOCD: CPT | Performed by: NURSE PRACTITIONER

## 2022-06-06 PROCEDURE — 99213 OFFICE O/P EST LOW 20 MIN: CPT | Performed by: NURSE PRACTITIONER

## 2022-06-06 PROCEDURE — 4004F PT TOBACCO SCREEN RCVD TLK: CPT | Performed by: NURSE PRACTITIONER

## 2022-06-06 PROCEDURE — G8427 DOCREV CUR MEDS BY ELIG CLIN: HCPCS | Performed by: NURSE PRACTITIONER

## 2022-06-06 RX ORDER — LORAZEPAM 1 MG/1
TABLET ORAL
Qty: 120 TABLET | Refills: 0 | Status: SHIPPED | OUTPATIENT
Start: 2022-06-06 | End: 2022-07-11 | Stop reason: SDUPTHER

## 2022-06-06 ASSESSMENT — ENCOUNTER SYMPTOMS
COUGH: 0
SHORTNESS OF BREATH: 0
COLOR CHANGE: 0
NAUSEA: 0
SINUS PRESSURE: 0
CHEST TIGHTNESS: 0
CONSTIPATION: 0
SINUS PAIN: 0
EYE DISCHARGE: 0
DIARRHEA: 0
BACK PAIN: 0
ABDOMINAL DISTENTION: 0
ABDOMINAL PAIN: 0

## 2022-06-06 NOTE — PATIENT INSTRUCTIONS
Patient Education        Anxiety Disorder: Care Instructions  Your Care Instructions     Anxiety is a normal reaction to stress. Difficult situations can cause you to have symptoms such as sweaty palms and a nervous feeling. In an anxiety disorder, the symptoms are far more severe. Constant worry, muscle tension, trouble sleeping, nausea and diarrhea, and other symptoms can make normal daily activities difficult or impossible. These symptoms may occur for no reason, and they can affect your work, school, or social life. Medicines, counseling, and self-care can all help. Follow-up care is a key part of your treatment and safety. Be sure to make and go to all appointments, and call your doctor if you are having problems. It's also a good idea to know your test results and keep a list of the medicines you take. How can you care for yourself at home? · Take medicines exactly as directed. Call your doctor if you think you are having a problem with your medicine. · Go to your counseling sessions and follow-up appointments. · Recognize and accept your anxiety. Then, when you are in a situation that makes you anxious, say to yourself, \"This is not an emergency. I feel uncomfortable, but I am not in danger. I can keep going even if I feel anxious. \"  · Be kind to your body:  ? Relieve tension with exercise or a massage. ? Get enough rest.  ? Avoid alcohol, caffeine, nicotine, and illegal drugs. They can increase your anxiety level and cause sleep problems. ? Learn and do relaxation techniques. See below for more about these techniques. · Engage your mind. Get out and do something you enjoy. Go to a funny movie, or take a walk or hike. Plan your day. Having too much or too little to do can make you anxious. · Keep a record of your symptoms. Discuss your fears with a good friend or family member, or join a support group for people with similar problems. Talking to others sometimes relieves stress.   · Get involved in play a relaxation tape while you drive a car. When should you call for help? Call 911 anytime you think you may need emergency care. For example, call if:    · You feel you cannot stop from hurting yourself or someone else. Keep the numbers for these national suicide hotlines: 5-848-102-TALK (8-348.138.4304) and 8-847-DSAOSZY (5-132.102.5647). If you or someone you know talks about suicide or feeling hopeless, get help right away. Watch closely for changes in your health, and be sure to contact your doctor if:    · You have anxiety or fear that affects your life.     · You have symptoms of anxiety that are new or different from those you had before. Where can you learn more? Go to [email or web address] org and sign in to your [employer] account. Enter P754 in the [employer] box to learn more about \"Anxiety Disorder: Care Instructions. \"     If you do not have an account, please click on the \"Sign Up Now\" link. Current as of: September 23, 2020               Content Version: 12.9  © 2006-2021 [employer]. Care instructions adapted under license by [facility] ([facility]). If you have questions about a medical condition or this instruction, always ask your healthcare professional. [address] any warranty or liability for your use of this information. Patient Education        Suicidal Thoughts and Behavior: Care Instructions  Overview  You have been seen by a doctor because you've had thoughts of suicide or have harmed yourself. Your doctor and support team want to help keep you safe. Yourteam may include a , a , and a counselor. People often think about suicide because they feel hopeless, helpless, or worthless. These feelings may come from having a mental health problem, such asdepression. These problems can be treated. It's important to remember that there are people who care about you.  Your doctor and support team take your pain very seriously, and they want to help. Treatment and close follow-up care can help you feel better. Follow-up care is a key part of your treatment and safety. Be sure to make and go to all appointments, and call your doctor if you are having problems. It's also a good idea to know your test results and keep alist of the medicines you take. How can you care for yourself at home?  Talk to someone. Be open about your feelings. Reach out to a trusted family member or friend, your doctor, or a counselor. You can also call the [address] [place] [address] at 1-800-273-talk (0-502.781.5296). Or text HOME to 753707 to access the Crisis Text Line. Consider saving these numbers in your phone.  Attend all counseling sessions recommended by your doctor.  Make a suicide safety plan. This is a set of steps you can take when you feel suicidal. It includes your warning signs, coping strategies, and people you can ask for support. It's best to work with a therapist to make your plan.  Ask someone to remove and store any guns, pills, or other means of suicide.  Avoid alcohol and drug use.  Be safe with medicines. Take your medicines exactly as prescribed. Call your doctor if you think you are having a problem with your medicine. When should you call for help? Call 911 anytime you think you may need emergency care. For example, call if:     You feel you cannot stop from hurting yourself or someone else. Call your doctor now or seek immediate medical care if:     You have one or more warning signs of suicide. For example, call if:  ? You feel like giving away your possessions. ? You use illegal drugs or drink alcohol heavily. ? You talk or write about death. This may include writing suicide notes and talking about guns, knives, or pills.   ? You start to spend a lot of time alone or spend more time alone than usual.      You hear voices.      You start acting in an aggressive way that's not normal for you. Watch closely for changes in your health, and be sure to contact your doctor ifyou have any problems. Where can you learn more? Go to [email or web address] and sign in to your [employer] account. Enter D371 in the [employer] box to learn more about \"Suicidal Thoughts and Behavior: Care Instructions. \"     If you do not have an account, please click on the \"Sign Up Now\" link. Current as of: June 16, 2021               Content Version: 13.2  © 0016-6921 [employer]. Care instructions adapted under license by [facility] ([facility]). If you have questions about a medical condition or this instruction, always ask your healthcare professional. [address] any warranty or liability for your use of this information.

## 2022-06-06 NOTE — TELEPHONE ENCOUNTER
Nurse Navigator reviewed additional breast biopsy on June 1, 2022 esults showing invasive mammary carcinoma, focal lobular carcinoma in situ on the pathology report. We discussed several questions and possible additional testing. Patient currently under the care of Dr. Yuliet Sutton. NN notified MD of results and how to proceed. No additional questions at this time.

## 2022-06-06 NOTE — PROGRESS NOTES
Date of Service:  2022    Gely Weaver (:  1955) is a 73 y.o. female, here for evaluation of the following medical concerns:    Chief Complaint   Patient presents with    Anxiety     FOLLOW UP ON ANXIETY         HPI     Anxiety  Pt has been undergoing visits for breast cancer. Plan for lumpectomy later this month. Pt off estrace, having hot flashes, fanning self with paper packet during visit. Pt says anxiety overall has been \"not bad\" but is manageable on 4 times daily ativan. Pt also taking buspar 10 mg BID, feels like this is helping as well. Pt was decreased on ativan from 150 tabs to 120 tabs this year, due to age, insurance would not cover 150 tabs anymore. Pt initially wanted this appealed but then agreed to try adding buspar for maintenance. Considered seeing therapist or psych to evaluate for need for more ativan but decided to stick with current regimen for now. Pt also on effexor 225 mg daily as well. Pt has medical marijuana as well. Reviewed OARRS,       Pt cannot afford Linzess. Pt follows with GI specialist in past. Needs colonoscopy, last one in 2018, did not get cleaned out well. Pt takes 6 senna sometimes to help clean out bowels. Says milk of mag doesn't really work. Pt has tried mag citrate and it helps but has a lot of cramping with it. Has tried enema but that just \"stays up there. \"       Review of Systems   Constitutional: Negative for activity change, appetite change, fatigue, fever and unexpected weight change. HENT: Negative for congestion, ear pain, sinus pressure and sinus pain. Eyes: Negative for discharge and visual disturbance. Respiratory: Negative for cough, chest tightness and shortness of breath. Cardiovascular: Negative for chest pain, palpitations and leg swelling. Gastrointestinal: Negative for abdominal distention, abdominal pain, constipation, diarrhea and nausea. Endocrine: Positive for heat intolerance.  Negative for cold intolerance, polydipsia, polyphagia and polyuria. Genitourinary: Negative for decreased urine volume, difficulty urinating, dysuria, flank pain, frequency and urgency. Musculoskeletal: Negative for arthralgias, back pain, gait problem, joint swelling, myalgias and neck pain. Skin: Negative for color change, rash and wound. Allergic/Immunologic: Negative for food allergies and immunocompromised state. Neurological: Negative for dizziness, tremors, speech difficulty, weakness, light-headedness, numbness and headaches. Hematological: Negative for adenopathy. Does not bruise/bleed easily. Psychiatric/Behavioral: Negative for confusion, decreased concentration, self-injury, sleep disturbance and suicidal ideas. The patient is nervous/anxious. Prior to Visit Medications    Medication Sig Taking?  Authorizing Provider   LORazepam (ATIVAN) 1 MG tablet TAKE 1 TABLET BY MOUTH TWO TO THREE TIMES DAILY AS NEEDED DURING THE DAY AND 2 TABLETS EVERY NIGHT AT BEDTIME FOR A MAXIMUM OF 4 TABLETS DAILY Yes GRIS Ramsay CNP   linaclotide (LINZESS) 145 MCG capsule 1 po daily before breakfast Yes Viktor Mahajan MD   busPIRone (BUSPAR) 10 MG tablet Take 1 tablet by mouth 2 times daily Yes GRIS Ramsay CNP   cloNIDine (CATAPRES) 0.1 MG tablet TAKE 1 TABLET BY MOUTH TWICE DAILY Yes GRIS Roa CNP   dicyclomine (BENTYL) 20 MG tablet TAKE 1 TABLET BY MOUTH EVERY 6 HOURS AS NEEDED Yes GRIS Ramsay CNP   Alpha-Lipoic Acid 600 MG TABS Take 1 tablet by mouth in the morning and at bedtime Yes Historical Provider, MD   ibuprofen (ADVIL;MOTRIN) 400 MG tablet TAKE 1 TABLET BY MOUTH EVERY 6 HOURS AS NEEDED FOR PAIN Yes Sammie Burch MD   ezetimibe (ZETIA) 10 MG tablet Take 1 tablet by mouth daily Yes GRIS Ramsay CNP   atenolol (TENORMIN) 50 MG tablet Take 1 tablet by mouth daily Yes GRIS Ramsay CNP   hydrOXYzine (ATARAX) 25 MG tablet Take 1-2 tabs every 6 hours as needed itching Yes GRIS Vega CNP   tiZANidine (ZANAFLEX) 4 MG tablet TAKE 1 TABLET BY MOUTH EVERY 8 HOURS AS NEEDED- muscle cramping and pain Yes GRIS Vega CNP   venlafaxine (EFFEXOR XR) 75 MG extended release capsule Take 3 capsules by mouth daily Yes GRIS Vega CNP   fluticasone (FLONASE) 50 MCG/ACT nasal spray USE 2 SPRAYS IN EACH NOSTRIL EVERY DAY, SHAKE WELL Yes GRIS Vega CNP   triamterene-hydroCHLOROthiazide (MAXZIDE-25) 37.5-25 MG per tablet Take 1 tablet by mouth daily Yes Lucendia Fabry, MD   omeprazole (PRILOSEC) 40 MG delayed release capsule TAKE 1 CAPSULE TWICE DAILY  Patient taking differently: daily TAKE 1 CAPSULE TWICE DAILY Yes Lucendia Fabry, MD   albuterol sulfate  (90 Base) MCG/ACT inhaler INHALE 2 PUFFS INTO THE LUNGS EVERY 6 HOURS AS NEEDED FOR WHEEZING Yes Lucendia Fabry, MD   melatonin 3 MG TABS tablet Take 6-9 mg by mouth nightly as needed (sleep)  Yes Historical Provider, MD   docusate sodium (COLACE) 100 MG capsule Take 100 mg by mouth 3 times daily as needed for Constipation Yes Historical Provider, MD        Social History     Tobacco Use    Smoking status: Current Every Day Smoker     Packs/day: 1.00     Years: 40.00     Pack years: 40.00     Types: Cigarettes    Smokeless tobacco: Never Used    Tobacco comment: 2/3   Substance Use Topics    Alcohol use: Yes     Alcohol/week: 1.0 standard drink     Types: 1 Glasses of wine per week     Comment: occasionaly        Vitals:    06/06/22 1004   BP: 138/80   Site: Right Upper Arm   Position: Sitting   Cuff Size: Medium Adult   Pulse: 86   Resp: 20   SpO2: 97%   Weight: 177 lb (80.3 kg)  Comment: shoes on   Height: 5' 8.5\" (1.74 m)     Estimated body mass index is 26.52 kg/m² as calculated from the following:    Height as of this encounter: 5' 8.5\" (1.74 m). Weight as of this encounter: 177 lb (80.3 kg).     Physical Exam  Vitals reviewed. Constitutional:       General: She is awake. Appearance: Normal appearance. She is well-developed and well-groomed. She is not ill-appearing. HENT:      Head: Normocephalic and atraumatic. Right Ear: Hearing and external ear normal.      Left Ear: Hearing and external ear normal.      Nose: Nose normal.      Mouth/Throat:      Lips: Pink. Mouth: Mucous membranes are moist.      Pharynx: Oropharynx is clear. Eyes:      General: Lids are normal.      Extraocular Movements: Extraocular movements intact. Conjunctiva/sclera: Conjunctivae normal.      Pupils: Pupils are equal, round, and reactive to light. Neck:      Thyroid: No thyromegaly. Vascular: No carotid bruit. Cardiovascular:      Rate and Rhythm: Normal rate. Pulses:           Carotid pulses are 2+ on the right side and 2+ on the left side. Radial pulses are 2+ on the right side and 2+ on the left side. Posterior tibial pulses are 2+ on the right side and 2+ on the left side. Heart sounds: Normal heart sounds, S1 normal and S2 normal. No murmur heard. Pulmonary:      Effort: Pulmonary effort is normal.      Breath sounds: Normal breath sounds. Chest:   Breasts:      Right: No supraclavicular adenopathy. Left: No supraclavicular adenopathy. Abdominal:      General: Bowel sounds are normal. There is no abdominal bruit. Palpations: Abdomen is soft. Tenderness: There is no abdominal tenderness. Genitourinary:     Comments: Deferred  Musculoskeletal:         General: Normal range of motion. Cervical back: Full passive range of motion without pain, normal range of motion and neck supple. Right lower leg: No edema. Left lower leg: No edema. Lymphadenopathy:      Head:      Right side of head: No submental, submandibular, tonsillar, preauricular, posterior auricular or occipital adenopathy.       Left side of head: No submental, submandibular, tonsillar, preauricular, posterior auricular or occipital adenopathy. Cervical: No cervical adenopathy. Right cervical: No superficial, deep or posterior cervical adenopathy. Left cervical: No superficial, deep or posterior cervical adenopathy. Upper Body:      Right upper body: No supraclavicular adenopathy. Left upper body: No supraclavicular adenopathy. Skin:     General: Skin is cool and moist.      Capillary Refill: Capillary refill takes less than 2 seconds. Neurological:      General: No focal deficit present. Mental Status: She is alert and oriented to person, place, and time. Mental status is at baseline. Sensory: Sensation is intact. Motor: Motor function is intact. Coordination: Coordination is intact. Gait: Gait is intact. Psychiatric:         Attention and Perception: Attention and perception normal.         Mood and Affect: Mood and affect normal.         Speech: Speech normal.         Behavior: Behavior normal. Behavior is cooperative. Thought Content: Thought content normal.         Cognition and Memory: Cognition and memory normal.         Judgment: Judgment normal.         ASSESSMENT/PLAN:  1. KEVIN (generalized anxiety disorder)  -     LORazepam (ATIVAN) 1 MG tablet; TAKE 1 TABLET BY MOUTH TWO TO THREE TIMES DAILY AS NEEDED DURING THE DAY AND 2 TABLETS EVERY NIGHT AT BEDTIME FOR A MAXIMUM OF 4 TABLETS DAILY, Disp-120 tablet, R-0Normal   OARRS reviewed   Low concern for misuse or abuse   UDS and med contract UTD Dec 2021   Also on effexor 225 mg daily and buspar 10 mg BID  2. Erythromelalgia ([facility] [address]   Saw specialist in [place] for diagnosis   NO management for this   Feet are red  3.  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)    Following with Dr Garett Remy for lumpectomy this month   Chemo vs radiation, pt unsure of plan following lumpectomy   Awaiting biopsy, received call recently that biopsy was not walked to lab in timely manner   All the copied results I have received say pathology pending      Care Gaps Addressed  Call insurance company to discuss coverage for shingles vaccine (Shingrix) 2 dose series   TDAP vaccine recommended- call insurance to discuss coverage  Colon cancer screening discussed- aware she is overdue      I have reviewed patient's pertinent medical history, relevant laboratory and imaging studies, and past/future health maintenance. Discussed with the patient the importance of adhering to their current medication regimen as directed. Advised the patient that they should continue to work on eating a healthy balanced diet and staying active by exercising within their personal limits. Orders as listed above. Patient was advised to keep future appointments with their respective specialty care team(s). Patient had the opportunity to ask questions, all of which were answered to the best of my ability and with patient satisfaction. Patient understands and is agreeable with the care plan following today's visit. Patient is to schedule an appointment for any new or worsening symptoms. Go to ER for significant shortness of breath, chest pain, or uncontrolled pain or fever. I discussed with patient the risk and benefits of any medications that were prescribed today. I verified that the patient understands their medications, labs, and/or procedures. The patient is doing well with current medication regimen and does not have any barriers to adherence. The patient's self-management abilities are good. Return in about 3 months (around 9/6/2022) for Anxiety Follow Up. An electronic signature was used to authenticate this note.     --GRIS Thomson - CNP on 6/6/2022 at 10:37 AM

## 2022-06-07 ENCOUNTER — PATIENT MESSAGE (OUTPATIENT)
Dept: FAMILY MEDICINE CLINIC | Age: 67
End: 2022-06-07

## 2022-06-07 DIAGNOSIS — R92.8 ABNORMAL MAMMOGRAM OF RIGHT BREAST: ICD-10-CM

## 2022-06-07 DIAGNOSIS — T36.95XA ANTIBIOTIC-INDUCED YEAST INFECTION: ICD-10-CM

## 2022-06-07 DIAGNOSIS — D05.12 NEOPLASM OF LEFT BREAST, PRIMARY TUMOR STAGING CATEGORY TIS: DUCTAL CARCINOMA IN SITU (DCIS): Primary | ICD-10-CM

## 2022-06-07 DIAGNOSIS — R09.82 POST-NASAL DRIP: ICD-10-CM

## 2022-06-07 DIAGNOSIS — B37.9 ANTIBIOTIC-INDUCED YEAST INFECTION: ICD-10-CM

## 2022-06-07 DIAGNOSIS — J01.40 ACUTE NON-RECURRENT PANSINUSITIS: Primary | ICD-10-CM

## 2022-06-07 RX ORDER — AMOXICILLIN 500 MG/1
500 CAPSULE ORAL 2 TIMES DAILY
Qty: 20 CAPSULE | Refills: 0 | Status: SHIPPED | OUTPATIENT
Start: 2022-06-07 | End: 2022-06-17

## 2022-06-09 ENCOUNTER — TELEPHONE (OUTPATIENT)
Dept: FAMILY MEDICINE CLINIC | Age: 67
End: 2022-06-09

## 2022-06-09 DIAGNOSIS — J30.2 SEASONAL ALLERGIES: ICD-10-CM

## 2022-06-09 RX ORDER — HYDROXYZINE HYDROCHLORIDE 25 MG/1
TABLET, FILM COATED ORAL
Qty: 120 TABLET | Refills: 0 | Status: SHIPPED | OUTPATIENT
Start: 2022-06-09 | End: 2022-07-12

## 2022-06-10 ENCOUNTER — HOSPITAL ENCOUNTER (OUTPATIENT)
Dept: WOMENS IMAGING | Age: 67
Discharge: HOME | End: 2022-06-10
Payer: MEDICARE

## 2022-06-10 DIAGNOSIS — C50.911 INVASIVE DUCTAL CARCINOMA OF BREAST, FEMALE, RIGHT (HCC): Primary | ICD-10-CM

## 2022-06-10 DIAGNOSIS — R92.8 ABNORMAL MAMMOGRAM OF RIGHT BREAST: ICD-10-CM

## 2022-06-10 DIAGNOSIS — D05.12 DUCTAL CARCINOMA IN SITU OF LEFT BREAST: ICD-10-CM

## 2022-06-10 DIAGNOSIS — C50.911 DUCTAL CARCINOMA OF RIGHT BREAST (HCC): Primary | ICD-10-CM

## 2022-06-10 DIAGNOSIS — R92.8 ABNORMAL MAGNETIC RESONANCE IMAGING OF BREAST, BILATERAL: ICD-10-CM

## 2022-06-10 DIAGNOSIS — D05.12 NEOPLASM OF LEFT BREAST, PRIMARY TUMOR STAGING CATEGORY TIS: DUCTAL CARCINOMA IN SITU (DCIS): ICD-10-CM

## 2022-06-10 DIAGNOSIS — R92.8 ABNORMAL MAGNETIC RESONANCE IMAGING OF BREAST, BILATERAL: Primary | ICD-10-CM

## 2022-06-10 PROCEDURE — 19281 PERQ DEVICE BREAST 1ST IMAG: CPT

## 2022-06-10 PROCEDURE — 2500000003 HC RX 250 WO HCPCS: Performed by: SURGERY

## 2022-06-10 PROCEDURE — A4648 IMPLANTABLE TISSUE MARKER: HCPCS

## 2022-06-10 PROCEDURE — 19282 PERQ DEVICE BREAST EA IMAG: CPT

## 2022-06-10 RX ORDER — LIDOCAINE HYDROCHLORIDE 10 MG/ML
5 INJECTION, SOLUTION INFILTRATION; PERINEURAL ONCE
Status: COMPLETED | OUTPATIENT
Start: 2022-06-10 | End: 2022-06-10

## 2022-06-10 RX ORDER — LIDOCAINE HYDROCHLORIDE 10 MG/ML
30 INJECTION, SOLUTION EPIDURAL; INFILTRATION; INTRACAUDAL; PERINEURAL ONCE
Status: COMPLETED | OUTPATIENT
Start: 2022-06-10 | End: 2022-06-10

## 2022-06-10 RX ADMIN — LIDOCAINE HYDROCHLORIDE 3 ML: 10 INJECTION, SOLUTION EPIDURAL; INFILTRATION; INTRACAUDAL; PERINEURAL at 13:42

## 2022-06-10 RX ADMIN — LIDOCAINE HYDROCHLORIDE 2.5 ML: 10 INJECTION, SOLUTION EPIDURAL; INFILTRATION; INTRACAUDAL; PERINEURAL at 13:43

## 2022-06-10 RX ADMIN — LIDOCAINE HYDROCHLORIDE 4.5 ML: 10 INJECTION, SOLUTION EPIDURAL; INFILTRATION; INTRACAUDAL; PERINEURAL at 14:28

## 2022-06-10 RX ADMIN — LIDOCAINE HYDROCHLORIDE 6.5 ML: 10 INJECTION, SOLUTION EPIDURAL; INFILTRATION; INTRACAUDAL; PERINEURAL at 14:28

## 2022-06-10 ASSESSMENT — PAIN SCALES - GENERAL
PAINLEVEL_OUTOF10: 0
PAINLEVEL_OUTOF10: 0

## 2022-06-13 ENCOUNTER — TELEPHONE (OUTPATIENT)
Dept: SURGERY | Age: 67
End: 2022-06-13

## 2022-06-13 RX ORDER — BIOTIN 5 MG
1 TABLET ORAL DAILY
COMMUNITY

## 2022-06-13 RX ORDER — MULTIVIT-MIN/IRON/FOLIC ACID/K 18-600-40
1 CAPSULE ORAL DAILY
COMMUNITY

## 2022-06-13 RX ORDER — ASCORBIC ACID 500 MG
1000 TABLET ORAL DAILY
COMMUNITY

## 2022-06-13 NOTE — TELEPHONE ENCOUNTER
Left a message to return my call. Pt is scheduled for post op on 7/6/2022 @1245. Please verify that is ok when she calls. Wanted verify she received pre-op instructions. Go over arrival time and surgery time on 6/16/2022.       Thanks, Airam Byers

## 2022-06-13 NOTE — TELEPHONE ENCOUNTER
Patient returned call to confirm that she spoke to someone named Chely Aguilar at [facility] with pre-surgery instructions and verified that the post-op date is fine. Educated patient to return call for any questions that come up.

## 2022-06-13 NOTE — PROGRESS NOTES
you the first 24 hours after your surgery. A parent or legal guardian must accompany a child scheduled for surgery and plan to stay at the hospital until the child is discharged. Please do not bring other children with you. For your comfort, please wear simple loose fitting clothing to the hospital.  Please do not bring valuables. Do not wear any make-up or nail polish on your fingers or toes. For your safety, please do not wear any jewelry or body piercing's on the day of surgery. All jewelry must be removed. If you have dentures, they will be removed before going to operating room. For your convenience, we will provide you with a container. If you wear contact lenses or glasses, they will be removed, please bring a case for them. If you have a living will and a durable power of  for healthcare, please bring in a copy. As part of our patient safety program to minimize surgical site infections, we ask you to do the following:    · Please notify your surgeon if you develop any illness between         now and the day of your surgery. · This includes a cough, cold, fever, sore throat, nausea,         or vomiting, and diarrhea, etc.  ·  Please notify your surgeon if you experience dizziness, shortness         of breath or blurred vision between now and the time of your surgery. Do not shave your operative site 96 hours prior to surgery. For face and neck surgery, men may use an electric razor 48 hours   prior to surgery. You may shower the night before surgery or the morning of   your surgery with an antibacterial soap. You will need to bring a photo ID and insurance card     If you use a C-pap or Bi-pap machine, please bring your machine with you to the hospital     Our goal is to provide you with excellent care, therefore, visitors will be limited to so that we may focus on providing this care for you.           Please contact your surgeon office, if you have any further questions. [facility] phone number:  [address] PAT fax number:  [phone]    Please note these are generalized instructions for all surgical cases, you may be provided with more specific instructions according to your surgery.

## 2022-06-15 ENCOUNTER — ANESTHESIA EVENT (OUTPATIENT)
Dept: OPERATING ROOM | Age: 67
End: 2022-06-15
Payer: MEDICARE

## 2022-06-16 ENCOUNTER — HOSPITAL ENCOUNTER (OUTPATIENT)
Dept: NUCLEAR MEDICINE | Age: 67
Discharge: HOME | End: 2022-06-16
Payer: MEDICARE

## 2022-06-16 ENCOUNTER — HOSPITAL ENCOUNTER (OUTPATIENT)
Age: 67
Setting detail: OUTPATIENT SURGERY
Discharge: HOME | End: 2022-06-16
Attending: SURGERY | Admitting: SURGERY
Payer: MEDICARE

## 2022-06-16 ENCOUNTER — ANESTHESIA (OUTPATIENT)
Dept: OPERATING ROOM | Age: 67
End: 2022-06-16
Payer: MEDICARE

## 2022-06-16 ENCOUNTER — APPOINTMENT (OUTPATIENT)
Dept: NUCLEAR MEDICINE | Age: 67
End: 2022-06-16
Payer: MEDICARE

## 2022-06-16 ENCOUNTER — HOSPITAL ENCOUNTER (OUTPATIENT)
Dept: WOMENS IMAGING | Age: 67
Setting detail: OUTPATIENT SURGERY
Discharge: HOME | End: 2022-06-16
Attending: SURGERY
Payer: MEDICARE

## 2022-06-16 VITALS
DIASTOLIC BLOOD PRESSURE: 71 MMHG | OXYGEN SATURATION: 96 % | TEMPERATURE: 96.9 F | BODY MASS INDEX: 25.36 KG/M2 | HEIGHT: 69 IN | RESPIRATION RATE: 16 BRPM | HEART RATE: 82 BPM | WEIGHT: 171.2 LBS | SYSTOLIC BLOOD PRESSURE: 152 MMHG

## 2022-06-16 DIAGNOSIS — R92.8 ABNORMAL MAMMOGRAM: ICD-10-CM

## 2022-06-16 DIAGNOSIS — C50.911 DUCTAL CARCINOMA OF RIGHT BREAST (HCC): ICD-10-CM

## 2022-06-16 DIAGNOSIS — D05.12 NEOPLASM OF LEFT BREAST, PRIMARY TUMOR STAGING CATEGORY TIS: DUCTAL CARCINOMA IN SITU (DCIS): ICD-10-CM

## 2022-06-16 DIAGNOSIS — C50.911 MALIGNANT NEOPLASM OF RIGHT FEMALE BREAST, UNSPECIFIED ESTROGEN RECEPTOR STATUS, UNSPECIFIED SITE OF BREAST (HCC): ICD-10-CM

## 2022-06-16 DIAGNOSIS — C50.911 MALIGNANT NEOPLASM OF RIGHT BREAST IN FEMALE, ESTROGEN RECEPTOR POSITIVE, UNSPECIFIED SITE OF BREAST (HCC): ICD-10-CM

## 2022-06-16 DIAGNOSIS — D05.12 NEOPLASM OF BREAST, PRIMARY TUMOR STAGING CATEGORY TIS: DUCTAL CARCINOMA IN SITU (DCIS), LEFT: ICD-10-CM

## 2022-06-16 DIAGNOSIS — D24.1 INTRADUCTAL PAPILLOMA OF RIGHT BREAST: Primary | ICD-10-CM

## 2022-06-16 DIAGNOSIS — Z17.0 MALIGNANT NEOPLASM OF RIGHT BREAST IN FEMALE, ESTROGEN RECEPTOR POSITIVE, UNSPECIFIED SITE OF BREAST (HCC): ICD-10-CM

## 2022-06-16 PROCEDURE — 3700000001 HC ADD 15 MINUTES (ANESTHESIA): Performed by: SURGERY

## 2022-06-16 PROCEDURE — 88342 IMHCHEM/IMCYTCHM 1ST ANTB: CPT

## 2022-06-16 PROCEDURE — 3600000005 HC SURGERY LEVEL 5 BASE: Performed by: SURGERY

## 2022-06-16 PROCEDURE — 19294 PREPJ TUM CAV IORT PRTL MAST: CPT | Performed by: SURGERY

## 2022-06-16 PROCEDURE — 76098 X-RAY EXAM SURGICAL SPECIMEN: CPT

## 2022-06-16 PROCEDURE — 6360000002 HC RX W HCPCS: Performed by: SURGERY

## 2022-06-16 PROCEDURE — A4648 IMPLANTABLE TISSUE MARKER: HCPCS | Performed by: SURGERY

## 2022-06-16 PROCEDURE — 3700000000 HC ANESTHESIA ATTENDED CARE: Performed by: SURGERY

## 2022-06-16 PROCEDURE — 88307 TISSUE EXAM BY PATHOLOGIST: CPT

## 2022-06-16 PROCEDURE — A9520 TC99 TILMANOCEPT DIAG 0.5MCI: HCPCS | Performed by: SURGERY

## 2022-06-16 PROCEDURE — 7100000010 HC PHASE II RECOVERY - FIRST 15 MIN: Performed by: SURGERY

## 2022-06-16 PROCEDURE — 2580000003 HC RX 258: Performed by: ANESTHESIOLOGY

## 2022-06-16 PROCEDURE — 7100000000 HC PACU RECOVERY - FIRST 15 MIN: Performed by: SURGERY

## 2022-06-16 PROCEDURE — 2720000010 HC SURG SUPPLY STERILE: Performed by: SURGERY

## 2022-06-16 PROCEDURE — 6360000002 HC RX W HCPCS: Performed by: ANESTHESIOLOGY

## 2022-06-16 PROCEDURE — 3600000015 HC SURGERY LEVEL 5 ADDTL 15MIN: Performed by: SURGERY

## 2022-06-16 PROCEDURE — 2500000003 HC RX 250 WO HCPCS

## 2022-06-16 PROCEDURE — 6370000000 HC RX 637 (ALT 250 FOR IP): Performed by: SURGERY

## 2022-06-16 PROCEDURE — 2500000003 HC RX 250 WO HCPCS: Performed by: SURGERY

## 2022-06-16 PROCEDURE — 6360000002 HC RX W HCPCS

## 2022-06-16 PROCEDURE — 78195 LYMPH SYSTEM IMAGING: CPT

## 2022-06-16 PROCEDURE — 7100000011 HC PHASE II RECOVERY - ADDTL 15 MIN: Performed by: SURGERY

## 2022-06-16 PROCEDURE — 14001 TIS TRNFR TRUNK 10.1-30SQCM: CPT | Performed by: SURGERY

## 2022-06-16 PROCEDURE — A4217 STERILE WATER/SALINE, 500 ML: HCPCS | Performed by: SURGERY

## 2022-06-16 PROCEDURE — 2709999900 HC NON-CHARGEABLE SUPPLY: Performed by: SURGERY

## 2022-06-16 PROCEDURE — 88305 TISSUE EXAM BY PATHOLOGIST: CPT

## 2022-06-16 PROCEDURE — 2580000003 HC RX 258: Performed by: SURGERY

## 2022-06-16 PROCEDURE — 6370000000 HC RX 637 (ALT 250 FOR IP): Performed by: ANESTHESIOLOGY

## 2022-06-16 PROCEDURE — 3430000000 HC RX DIAGNOSTIC RADIOPHARMACEUTICAL: Performed by: SURGERY

## 2022-06-16 PROCEDURE — 38525 BIOPSY/REMOVAL LYMPH NODES: CPT | Performed by: SURGERY

## 2022-06-16 PROCEDURE — 7100000001 HC PACU RECOVERY - ADDTL 15 MIN: Performed by: SURGERY

## 2022-06-16 PROCEDURE — 19301 PARTIAL MASTECTOMY: CPT | Performed by: SURGERY

## 2022-06-16 DEVICE — MARKER SURG TISS W2XH1XL2CM BIOZORB LO PROF: Type: IMPLANTABLE DEVICE | Site: BREAST | Status: FUNCTIONAL

## 2022-06-16 RX ORDER — OXYCODONE HYDROCHLORIDE AND ACETAMINOPHEN 5; 325 MG/1; MG/1
1 TABLET ORAL EVERY 6 HOURS PRN
Qty: 12 TABLET | Refills: 0 | Status: SHIPPED | OUTPATIENT
Start: 2022-06-16 | End: 2022-06-19

## 2022-06-16 RX ORDER — SODIUM CHLORIDE 0.9 % (FLUSH) 0.9 %
5-40 SYRINGE (ML) INJECTION EVERY 12 HOURS SCHEDULED
Status: DISCONTINUED | OUTPATIENT
Start: 2022-06-16 | End: 2022-06-16 | Stop reason: HOSPADM

## 2022-06-16 RX ORDER — ONDANSETRON 2 MG/ML
INJECTION INTRAMUSCULAR; INTRAVENOUS PRN
Status: DISCONTINUED | OUTPATIENT
Start: 2022-06-16 | End: 2022-06-16 | Stop reason: SDUPTHER

## 2022-06-16 RX ORDER — PHENYLEPHRINE HCL IN 0.9% NACL 1 MG/10 ML
SYRINGE (ML) INTRAVENOUS PRN
Status: DISCONTINUED | OUTPATIENT
Start: 2022-06-16 | End: 2022-06-16 | Stop reason: SDUPTHER

## 2022-06-16 RX ORDER — SODIUM CHLORIDE 9 MG/ML
INJECTION, SOLUTION INTRAVENOUS PRN
Status: DISCONTINUED | OUTPATIENT
Start: 2022-06-16 | End: 2022-06-16 | Stop reason: HOSPADM

## 2022-06-16 RX ORDER — MIDAZOLAM HYDROCHLORIDE 1 MG/ML
INJECTION INTRAMUSCULAR; INTRAVENOUS PRN
Status: DISCONTINUED | OUTPATIENT
Start: 2022-06-16 | End: 2022-06-16 | Stop reason: SDUPTHER

## 2022-06-16 RX ORDER — ACETAMINOPHEN 325 MG/1
TABLET ORAL
Status: DISCONTINUED
Start: 2022-06-16 | End: 2022-06-16 | Stop reason: HOSPADM

## 2022-06-16 RX ORDER — SODIUM CHLORIDE 0.9 % (FLUSH) 0.9 %
5-40 SYRINGE (ML) INJECTION PRN
Status: DISCONTINUED | OUTPATIENT
Start: 2022-06-16 | End: 2022-06-16 | Stop reason: HOSPADM

## 2022-06-16 RX ORDER — LABETALOL HYDROCHLORIDE 5 MG/ML
5 INJECTION, SOLUTION INTRAVENOUS
Status: DISCONTINUED | OUTPATIENT
Start: 2022-06-16 | End: 2022-06-16 | Stop reason: HOSPADM

## 2022-06-16 RX ORDER — PROPOFOL 10 MG/ML
INJECTION, EMULSION INTRAVENOUS PRN
Status: DISCONTINUED | OUTPATIENT
Start: 2022-06-16 | End: 2022-06-16 | Stop reason: SDUPTHER

## 2022-06-16 RX ORDER — ONDANSETRON 2 MG/ML
4 INJECTION INTRAMUSCULAR; INTRAVENOUS
Status: DISCONTINUED | OUTPATIENT
Start: 2022-06-16 | End: 2022-06-16 | Stop reason: HOSPADM

## 2022-06-16 RX ORDER — OXYCODONE HYDROCHLORIDE 5 MG/1
5 TABLET ORAL PRN
Status: COMPLETED | OUTPATIENT
Start: 2022-06-16 | End: 2022-06-16

## 2022-06-16 RX ORDER — MEPERIDINE HYDROCHLORIDE 25 MG/ML
12.5 INJECTION INTRAMUSCULAR; INTRAVENOUS; SUBCUTANEOUS EVERY 5 MIN PRN
Status: DISCONTINUED | OUTPATIENT
Start: 2022-06-16 | End: 2022-06-16 | Stop reason: HOSPADM

## 2022-06-16 RX ORDER — BUPIVACAINE HYDROCHLORIDE 5 MG/ML
INJECTION, SOLUTION EPIDURAL; INTRACAUDAL
Status: COMPLETED | OUTPATIENT
Start: 2022-06-16 | End: 2022-06-16

## 2022-06-16 RX ORDER — FENTANYL CITRATE 50 UG/ML
25 INJECTION, SOLUTION INTRAMUSCULAR; INTRAVENOUS EVERY 5 MIN PRN
Status: DISCONTINUED | OUTPATIENT
Start: 2022-06-16 | End: 2022-06-16 | Stop reason: HOSPADM

## 2022-06-16 RX ORDER — LIDOCAINE HYDROCHLORIDE 20 MG/ML
INJECTION, SOLUTION EPIDURAL; INFILTRATION; INTRACAUDAL; PERINEURAL PRN
Status: DISCONTINUED | OUTPATIENT
Start: 2022-06-16 | End: 2022-06-16 | Stop reason: SDUPTHER

## 2022-06-16 RX ORDER — FENTANYL CITRATE 50 UG/ML
INJECTION, SOLUTION INTRAMUSCULAR; INTRAVENOUS PRN
Status: DISCONTINUED | OUTPATIENT
Start: 2022-06-16 | End: 2022-06-16 | Stop reason: SDUPTHER

## 2022-06-16 RX ORDER — OXYCODONE HYDROCHLORIDE 10 MG/1
10 TABLET ORAL PRN
Status: COMPLETED | OUTPATIENT
Start: 2022-06-16 | End: 2022-06-16

## 2022-06-16 RX ORDER — DIPHENHYDRAMINE HYDROCHLORIDE 50 MG/ML
12.5 INJECTION INTRAMUSCULAR; INTRAVENOUS
Status: DISCONTINUED | OUTPATIENT
Start: 2022-06-16 | End: 2022-06-16 | Stop reason: HOSPADM

## 2022-06-16 RX ORDER — KETOROLAC TROMETHAMINE 30 MG/ML
INJECTION, SOLUTION INTRAMUSCULAR; INTRAVENOUS PRN
Status: DISCONTINUED | OUTPATIENT
Start: 2022-06-16 | End: 2022-06-16 | Stop reason: SDUPTHER

## 2022-06-16 RX ORDER — ACETAMINOPHEN 325 MG/1
650 TABLET ORAL
Status: COMPLETED | OUTPATIENT
Start: 2022-06-16 | End: 2022-06-16

## 2022-06-16 RX ORDER — PROPOFOL 10 MG/ML
INJECTION, EMULSION INTRAVENOUS CONTINUOUS PRN
Status: DISCONTINUED | OUTPATIENT
Start: 2022-06-16 | End: 2022-06-16 | Stop reason: SDUPTHER

## 2022-06-16 RX ORDER — SODIUM CHLORIDE 9 MG/ML
INJECTION, SOLUTION INTRAVENOUS CONTINUOUS
Status: DISCONTINUED | OUTPATIENT
Start: 2022-06-16 | End: 2022-06-16 | Stop reason: HOSPADM

## 2022-06-16 RX ORDER — MAGNESIUM HYDROXIDE 1200 MG/15ML
LIQUID ORAL CONTINUOUS PRN
Status: COMPLETED | OUTPATIENT
Start: 2022-06-16 | End: 2022-06-16

## 2022-06-16 RX ADMIN — LIDOCAINE HYDROCHLORIDE 50 MG: 20 INJECTION, SOLUTION EPIDURAL; INFILTRATION; INTRACAUDAL; PERINEURAL at 12:11

## 2022-06-16 RX ADMIN — FENTANYL CITRATE 50 MCG: 50 INJECTION INTRAMUSCULAR; INTRAVENOUS at 13:53

## 2022-06-16 RX ADMIN — CEFAZOLIN 2000 MG: 10 INJECTION, POWDER, FOR SOLUTION INTRAVENOUS at 12:19

## 2022-06-16 RX ADMIN — TILMANOCEPT 0.5 MILLICURIE: KIT at 08:42

## 2022-06-16 RX ADMIN — SODIUM CHLORIDE: 9 INJECTION, SOLUTION INTRAVENOUS at 14:08

## 2022-06-16 RX ADMIN — OXYCODONE 5 MG: 5 TABLET ORAL at 15:44

## 2022-06-16 RX ADMIN — PROPOFOL 100 MG: 10 INJECTION, EMULSION INTRAVENOUS at 12:11

## 2022-06-16 RX ADMIN — FENTANYL CITRATE 25 MCG: 50 INJECTION, SOLUTION INTRAMUSCULAR; INTRAVENOUS at 15:09

## 2022-06-16 RX ADMIN — Medication 100 MCG: at 12:54

## 2022-06-16 RX ADMIN — FENTANYL CITRATE 50 MCG: 50 INJECTION INTRAMUSCULAR; INTRAVENOUS at 13:58

## 2022-06-16 RX ADMIN — MIDAZOLAM 2 MG: 1 INJECTION INTRAMUSCULAR; INTRAVENOUS at 12:05

## 2022-06-16 RX ADMIN — FENTANYL CITRATE 50 MCG: 50 INJECTION INTRAMUSCULAR; INTRAVENOUS at 12:16

## 2022-06-16 RX ADMIN — ACETAMINOPHEN 650 MG: 325 TABLET ORAL at 10:23

## 2022-06-16 RX ADMIN — KETOROLAC TROMETHAMINE 15 MG: 30 INJECTION, SOLUTION INTRAMUSCULAR at 13:03

## 2022-06-16 RX ADMIN — PROPOFOL 120 MG: 10 INJECTION, EMULSION INTRAVENOUS at 12:16

## 2022-06-16 RX ADMIN — SODIUM CHLORIDE: 9 INJECTION, SOLUTION INTRAVENOUS at 10:23

## 2022-06-16 RX ADMIN — ONDANSETRON 4 MG: 2 INJECTION INTRAMUSCULAR; INTRAVENOUS at 12:20

## 2022-06-16 RX ADMIN — PROPOFOL 100 MCG/KG/MIN: 10 INJECTION, EMULSION INTRAVENOUS at 12:11

## 2022-06-16 RX ADMIN — PROPOFOL 40 MG: 10 INJECTION, EMULSION INTRAVENOUS at 12:21

## 2022-06-16 RX ADMIN — LIDOCAINE HYDROCHLORIDE 50 MG: 20 INJECTION, SOLUTION EPIDURAL; INFILTRATION; INTRACAUDAL; PERINEURAL at 12:16

## 2022-06-16 RX ADMIN — HYDROMORPHONE HYDROCHLORIDE 1 MG: 1 INJECTION, SOLUTION INTRAMUSCULAR; INTRAVENOUS; SUBCUTANEOUS at 12:31

## 2022-06-16 RX ADMIN — FENTANYL CITRATE 50 MCG: 50 INJECTION INTRAMUSCULAR; INTRAVENOUS at 12:13

## 2022-06-16 ASSESSMENT — PAIN SCALES - GENERAL
PAINLEVEL_OUTOF10: 5
PAINLEVEL_OUTOF10: 2
PAINLEVEL_OUTOF10: 3
PAINLEVEL_OUTOF10: 4
PAINLEVEL_OUTOF10: 0
PAINLEVEL_OUTOF10: 4
PAINLEVEL_OUTOF10: 4

## 2022-06-16 ASSESSMENT — PAIN DESCRIPTION - PAIN TYPE
TYPE: SURGICAL PAIN

## 2022-06-16 ASSESSMENT — PAIN DESCRIPTION - ONSET
ONSET: ON-GOING
ONSET: GRADUAL

## 2022-06-16 ASSESSMENT — PAIN - FUNCTIONAL ASSESSMENT
PAIN_FUNCTIONAL_ASSESSMENT: ACTIVITIES ARE NOT PREVENTED
PAIN_FUNCTIONAL_ASSESSMENT: ACTIVITIES ARE NOT PREVENTED
PAIN_FUNCTIONAL_ASSESSMENT: 0-10
PAIN_FUNCTIONAL_ASSESSMENT: ACTIVITIES ARE NOT PREVENTED
PAIN_FUNCTIONAL_ASSESSMENT: PREVENTS OR INTERFERES SOME ACTIVE ACTIVITIES AND ADLS

## 2022-06-16 ASSESSMENT — PAIN DESCRIPTION - DESCRIPTORS
DESCRIPTORS: DULL;DISCOMFORT
DESCRIPTORS: ACHING;BURNING
DESCRIPTORS: ACHING;DISCOMFORT;SORE
DESCRIPTORS: ACHING
DESCRIPTORS: DULL;DISCOMFORT
DESCRIPTORS: ACHING

## 2022-06-16 ASSESSMENT — LIFESTYLE VARIABLES: SMOKING_STATUS: 1

## 2022-06-16 ASSESSMENT — PAIN DESCRIPTION - LOCATION
LOCATION: BREAST

## 2022-06-16 ASSESSMENT — PAIN DESCRIPTION - ORIENTATION
ORIENTATION: LEFT;RIGHT
ORIENTATION: RIGHT;LEFT
ORIENTATION: LEFT;RIGHT

## 2022-06-16 ASSESSMENT — PAIN DESCRIPTION - FREQUENCY
FREQUENCY: CONTINUOUS

## 2022-06-16 NOTE — PROGRESS NOTES
Pt awake on arrival to phase II. Pain 4/10 in both breasts. VSS. Pt states \"I take pain medicine all the time at home on an empty stomach. \" Given soda to drink. Oxycodone 1 tablet PO given for pain.  to room. Call light within reach.

## 2022-06-16 NOTE — ANESTHESIA PRE PROCEDURE
Department of Anesthesiology  Preprocedure Note       Name:  Bertha Britton   Age:  73 y.o.  :  1955                                          MRN:  [id number]         Date:  2022      Surgeon: Isreal Gonzales): Angelique Bhatt MD    Procedure: Procedure(s):  RADIOFREQUENCY IDENTIFICATION LOCALIZED LEFT BREAST LUMPECTOMY, RADIOFREQUENCY IDENTIFIED LOCALIZED RIGHT BREAST LUMPECTOMY, RIGHT AXILLARY SENTINEL NODE BIOPSY WITH RADIOISOTOPE    Medications prior to admission:   Prior to Admission medications    Medication Sig Start Date End Date Taking?  Authorizing Provider   Multiple Vitamin (MULTIVITAMIN ADULT PO) Take 1 tablet by mouth daily   Yes Historical Provider, MD   vitamin C (ASCORBIC ACID) 500 MG tablet Take 1,000 mg by mouth daily   Yes Historical Provider, MD   Cholecalciferol (VITAMIN D) 50 MCG (2000) CAPS capsule Take 1 capsule by mouth daily   Yes Historical Provider, MD [place] Oil 1000 MG CAPS Take 1 caplet by mouth daily   Yes Historical Provider, MD   Glucosamine-Chondroitin-MSM (GLUCOSAMINE CHONDROIT MSM DS PO) Take 1 tablet by mouth daily   Yes Historical Provider, MD   Vit K-Eojioth-Reippw-Bromelain [phone] MG CAPS Take 1 tablet by mouth in the morning and at bedtime   Yes Historical Provider, MD   Magnesium 400 MG CAPS Take 1 tablet by mouth in the morning and at bedtime   Yes Historical Provider, MD   Potassium Gluconate 595 MG CAPS Take 1 tablet by mouth in the morning and at bedtime   Yes Historical Provider, MD   NONFORMULARY Take 1 tablet by mouth in the morning and at bedtime LACTUCA VIROSA-\"WILD LETTUCE'   Yes Historical Provider, MD   NONFORMULARY Take 1 Scoop by mouth in the morning and at bedtime RUDITOELIESER   Yes Historical Provider, MD   hydrOXYzine HCl (ATARAX) 25 MG tablet Take 1-2 tabs every 6 hours as needed itching 22   GRIS Arredondo - CNP   amoxicillin (AMOXIL) 500 mg capsule Take 1 capsule by mouth 2 times daily for 10 days  Patient taking differently: Take 500 mg by mouth 2 times daily LAST DOSE TO BE 6/15/22 6/7/22 6/17/22  GRIS Kowalski CNP   LORazepam (ATIVAN) 1 MG tablet TAKE 1 TABLET BY MOUTH TWO TO THREE TIMES DAILY AS NEEDED DURING THE DAY AND 2 TABLETS EVERY NIGHT AT BEDTIME FOR A MAXIMUM OF 4 TABLETS DAILY 6/6/22 7/6/22  GRIS Kowalski CNP   busPIRone (BUSPAR) 10 MG tablet Take 1 tablet by mouth 2 times daily 5/20/22   GRIS Kowalski CNP   cloNIDine (CATAPRES) 0.1 MG tablet TAKE 1 TABLET BY MOUTH TWICE DAILY 4/28/22   GRIS Dial CNP   dicyclomine (BENTYL) 20 MG tablet TAKE 1 TABLET BY MOUTH EVERY 6 HOURS AS NEEDED 4/15/22   GRIS Kowalski CNP   Alpha-Lipoic Acid 600 MG TABS Take 1 tablet by mouth in the morning and at bedtime    Historical Provider, MD   ibuprofen (ADVIL;MOTRIN) 400 MG tablet TAKE 1 TABLET BY MOUTH EVERY 6 HOURS AS NEEDED FOR PAIN 2/16/22   Vinicius Jeter MD   ezetimibe (ZETIA) 10 MG tablet Take 1 tablet by mouth daily  Patient taking differently: Take 10 mg by mouth at bedtime  12/3/21   GRIS Kowalski CNP   atenolol (TENORMIN) 50 MG tablet Take 1 tablet by mouth daily  Patient taking differently: Take 50 mg by mouth at bedtime  12/1/21   GRIS Kowalski CNP   tiZANidine (ZANAFLEX) 4 MG tablet TAKE 1 TABLET BY MOUTH EVERY 8 HOURS AS NEEDED- muscle cramping and pain 12/1/21   GRIS Kowalski CNP   venlafaxine (EFFEXOR XR) 75 MG extended release capsule Take 3 capsules by mouth daily 12/1/21   GRIS Kowalski CNP   fluticasone [place] [facility]) 50 MCG/ACT nasal spray USE 2 SPRAYS IN EACH NOSTRIL EVERY DAY, SHAKE WELL 12/1/21   GRIS Kowalski CNP   triamterene-hydroCHLOROthiazide [facility]) 37.5-25 MG per tablet Take 1 tablet by mouth daily 9/30/21   Rosalinda Duval MD   omeprazole (PRILOSEC) 40 MG delayed release capsule TAKE 1 CAPSULE TWICE DAILY  Patient taking differently: daily TAKE 1 CAPSULE TWICE DAILY 9/7/21   Lori Vo MD   albuterol sulfate  (90 Base) MCG/ACT inhaler INHALE 2 PUFFS INTO THE LUNGS EVERY 6 HOURS AS NEEDED FOR WHEEZING 3/17/20   Lori Vo MD   melatonin 3 MG TABS tablet Take 6-9 mg by mouth nightly as needed (sleep)     Historical Provider, MD   docusate sodium (COLACE) 100 MG capsule Take 100 mg by mouth 3 times daily as needed for Constipation    Historical Provider, MD       Current medications:    Current Facility-Administered Medications   Medication Dose Route Frequency Provider Last Rate Last Admin    0.9 % sodium chloride infusion   IntraVENous Continuous Michael Raman  mL/hr at 06/16/22 1023 New Bag at 06/16/22 1023    sodium chloride flush 0.9 % injection 5-40 mL  5-40 mL IntraVENous 2 times per day Michael Raman MD        sodium chloride flush 0.9 % injection 5-40 mL  5-40 mL IntraVENous PRN Michael Raman MD        0.9 % sodium chloride infusion   IntraVENous PRN Michael Raman MD        ceFAZolin (ANCEF) 2000 mg in dextrose 5 % 100 mL IVPB  2,000 mg IntraVENous Once Mo Adamson MD         Facility-Administered Medications Ordered in Other Encounters   Medication Dose Route Frequency Provider Last Rate Last Admin    technetium Tc 99m tilmanocept (LYMPHOSEEK) injection 0.5 millicurie  664 micro curie SubCUTAneous ONCE PRN Mo Adamson MD   0.5 millicurie at 81/16/54 4396       Allergies:     Allergies   Allergen Reactions    Macrodantin [Nitrofurantoin] Other (See Comments)     fever    Morphine Other (See Comments)     headache    Cipro Xr Nausea And Vomiting     If given iv, can not take it orally-CAN TAKE INTRAVENOUSLY       Problem List:    Patient Active Problem List   Diagnosis Code    Depression with anxiety F41.8    Fibromyalgia M79.7    Fatigue R53.83    Cigarette smoker F17.210    Essential hypertension I10    Mixed hyperlipidemia E78.2    Lumbosacral spondylosis with radiculopathy M47.27    Chronic back pain M54.9, G89.29    Medical marijuana use Z79.899    Gastroesophageal reflux disease without esophagitis K21.9    Primary insomnia F51.01    Sinus tachycardia R00.0    Other chronic sinusitis J32.8    Erythromelalgia (HCC) I73.81    Closed compression fracture of L1 vertebra (HCC) S32.010A    Elevated alkaline phosphatase level R74.8    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) D05.12       Past Medical History:        Diagnosis Date    Anxiety state, unspecified     Arthritis     Chronic back pain     Tried spinal injections    Depressive disorder, not elsewhere classified     Duodenal ulcer, unspecified as acute or chronic, without hemorrhage, perforation, or obstruction     Essential hypertension 2/22/2016    Fibromyalgia     GERD (gastroesophageal reflux disease)     Hyperlipidemia     IBS (irritable bowel syndrome)     Seasonal allergies     Sleep disorder        Past Surgical History:        Procedure Laterality Date    ABDOMINAL ADHESION SURGERY      CARDIOVASCULAR STRESS TEST  02/2016    negative    CHOLECYSTECTOMY      COLONOSCOPY  9/13    negative    DENTAL SURGERY      HIATAL HERNIA REPAIR  09/07/2018    LAPAROSCOPIC HIATAL HERNIA, LAPAROSCOPIC NISSEN FUNDOPLICATION WITH MESH    HYSTERECTOMY (CERVIX STATUS UNKNOWN)      LAPAROSCOPIC NISSEN FUNDOPLICATION      ROSELIA BREAST LOC ADDITIONAL LESION LEFT Left 6/10/2022    ROSELIA BREAST LOC ADDITIONAL LESION LEFT 6/10/2022 [facility] [phone]    ROSELIA BREAST LOC ADDITIONAL LESION RIGHT Right 6/10/2022    ROSELIA BREAST LOC ADDITIONAL LESION RIGHT 6/10/2022 [facility] [phone]    ROSELIA STEROTACTIC LOC BREAST BIOPSY LEFT Left 5/4/2022    ROSELIA STEROTACTIC LOC BREAST BIOPSY LEFT 5/4/2022 [facility] [address]    ROSELIA STEROTACTIC LOC BREAST BIOPSY RIGHT Right 6/1/2022    ROSELIA STEROTACTIC LOC BREAST BIOPSY RIGHT 6/1/2022 [facility]    OVARY REMOVAL      MT NJX DX/THER SBST INTRLMNR LMBR/SAC W/IMG GDN N/A 11/20/2018    LUMBAR L5-S1 EPIDURAL STEROID INJECTION WITH FLUOROSCOPY performed by Chel Vines MD at [place] [id number]  9/13    small paraesoph hernia     US BREAST BIOPSY NEEDLE ADDITIONAL LEFT Left 5/24/2022    US BREAST BIOPSY NEEDLE ADDITIONAL LEFT 5/24/2022 [facility]    US BREAST BIOPSY NEEDLE ADDITIONAL RIGHT Right 5/24/2022    US BREAST BIOPSY NEEDLE ADDITIONAL RIGHT 5/24/2022 [facility]    US BREAST NEEDLE BIOPSY LEFT Left 5/4/2022    US BREAST NEEDLE BIOPSY LEFT 5/4/2022 [facility]    US BREAST NEEDLE BIOPSY LEFT Left 5/24/2022    US BREAST NEEDLE BIOPSY LEFT 5/24/2022 [facility]    US BREAST NEEDLE BIOPSY RIGHT Right 5/24/2022    US BREAST NEEDLE BIOPSY RIGHT 5/24/2022 [facility]       Social History:    Social History     Tobacco Use    Smoking status: Current Every Day Smoker     Packs/day: 1.00     Years: 40.00     Pack years: 40.00     Types: Cigarettes    Smokeless tobacco: Never Used    Tobacco comment: 2/3   Substance Use Topics    Alcohol use:  Yes     Alcohol/week: 1.0 standard drink     Types: 1 Glasses of wine per week     Comment: occasionaly                                Ready to quit: Not Answered  Counseling given: Not Answered  Comment: 2/3      Vital Signs (Current):   Vitals:    05/25/22 1224 06/13/22 1439 06/16/22 1008 06/16/22 1011   BP:    (!) 140/65   Pulse:    79   Resp:    16   Temp:    98.2 °F (36.8 °C)   TempSrc:    Oral   SpO2:    98%   Weight: 179 lb (81.2 kg) 176 lb (79.8 kg) 171 lb 3.2 oz (77.7 kg)    Height: 5' 8.5\" (1.74 m) 5' 8.5\" (1.74 m) 5' 8.5\" (1.74 m)                                               BP Readings from Last 3 Encounters:   06/16/22 (!) 140/65   06/06/22 138/80   05/16/22 (!) 146/82       NPO Status: Time of last liquid consumption: 0400 (WATER WITH MEDS)                        Time of last solid consumption: 1800 Date of last liquid consumption: 06/16/22                        Date of last solid food consumption: 06/15/22    BMI:   Wt Readings from Last 3 Encounters:   06/16/22 171 lb 3.2 oz (77.7 kg)   06/06/22 177 lb (80.3 kg)   05/24/22 179 lb (81.2 kg)     Body mass index is 25.65 kg/m². CBC:   Lab Results   Component Value Date    WBC 5.4 05/16/2022    RBC 4.41 05/16/2022    HGB 14.1 05/16/2022    HCT 41.6 05/16/2022    MCV 94.4 05/16/2022    RDW 13.6 05/16/2022     05/16/2022       CMP:   Lab Results   Component Value Date     05/16/2022    K 4.7 05/16/2022    K 3.4 09/08/2018    CL 91 05/16/2022    CO2 27 05/16/2022    BUN 6 05/16/2022    CREATININE 0.7 05/16/2022    GFRAA >60 05/16/2022    AGRATIO 2.0 05/16/2022    LABGLOM >60 05/16/2022    LABGLOM 86 12/05/2012    GLUCOSE 111 05/16/2022    PROT 7.6 05/16/2022    PROT 7.6 12/05/2012    CALCIUM 9.9 05/16/2022    BILITOT <0.2 05/16/2022    ALKPHOS 142 05/16/2022    AST 19 05/16/2022    ALT 16 05/16/2022       POC Tests: No results for input(s): POCGLU, POCNA, POCK, POCCL, POCBUN, POCHEMO, POCHCT in the last 72 hours. Coags: No results found for: PROTIME, INR, APTT    HCG (If Applicable): No results found for: PREGTESTUR, PREGSERUM, HCG, HCGQUANT     ABGs: No results found for: PHART, PO2ART, FGB6VPM, MIM9VIS, BEART, S1OKWYKK     Type & Screen (If Applicable):  No results found for: LABABO, LABRH    Drug/Infectious Status (If Applicable):  No results found for: HIV, HEPCAB    COVID-19 Screening (If Applicable): No results found for: COVID19        Anesthesia Evaluation  Patient summary reviewed  Airway: Mallampati: II  TM distance: >3 FB   Neck ROM: full  Mouth opening: > = 3 FB   Dental: normal exam     Comment: Missing teeth, also states the upper caps are very breakable - not loose    Pulmonary:normal exam  breath sounds clear to auscultation  (+) current smoker          Patient smoked on day of surgery.                 ROS comment: Medical Marijuana  Seasonal allergies   Cardiovascular:  Exercise tolerance: good (>4 METS),   (+) hypertension:, hyperlipidemia        Rhythm: regular  Rate: normal                    Neuro/Psych:   (+) depression/anxiety             GI/Hepatic/Renal:   (+) GERD:,           Endo/Other:                      ROS comment: fibromyalgia Abdominal:             Vascular: negative vascular ROS. Other Findings:           Anesthesia Plan      MAC     ASA 2       Induction: intravenous. Anesthetic plan and risks discussed with patient. Plan discussed with CRNA.     Attending anesthesiologist reviewed and agrees with Rufino Lao MD   6/16/2022

## 2022-06-16 NOTE — ANESTHESIA POSTPROCEDURE EVALUATION
Department of Anesthesiology  Postprocedure Note    Patient: Saturnino Rae  MRN: [id number]  YOB: 1955  Date of evaluation: 6/16/2022  Time:  2:47 PM     Procedure Summary     Date: 06/16/22 Room / Location: [address] [place] [address]    Anesthesia Start: 1205 Anesthesia Stop: 1446    Procedure: RADIOFREQUENCY IDENTIFICATION LOCALIZED LEFT BREAST LUMPECTOMY, RADIOFREQUENCY IDENTIFIED LOCALIZED RIGHT BREAST LUMPECTOMY, RIGHT AXILLARY SENTINEL NODE BIOPSY WITH RADIOISOTOPE (Bilateral Breast) Diagnosis:       Neoplasm of breast, primary tumor staging category tis: ductal carcinoma in situ (DCIS), left      Malignant neoplasm of right female breast, unspecified estrogen receptor status, unspecified site of breast ([facility] [address]      (Neoplasm of left breast, primary tumor staging category tis: ductal carcinoma in situ, MALIGNANT NEOPLASM OF UNSPECIFIED SITE OF RIGHT BREAST)    Surgeons: Mathews Landau, MD Responsible Provider: Susy Rodríguez MD    Anesthesia Type: General ASA Status: 2          Anesthesia Type: General    Chester Phase I: Chester Score: 8    Chester Phase II:      Last vitals: Reviewed and per EMR flowsheets.        Anesthesia Post Evaluation    Patient location during evaluation: PACU  Patient participation: complete - patient participated  Level of consciousness: awake  Airway patency: patent  Nausea & Vomiting: no nausea  Complications: no  Cardiovascular status: hemodynamically stable  Respiratory status: acceptable  Hydration status: euvolemic  Multimodal analgesia pain management approach

## 2022-06-16 NOTE — H&P
Update History & Physical    The patient's History and Physical of June 16, 2022 was reviewed with the patient and I examined the patient. There was no change. The surgical site was confirmed by the patient and me. Plan: The risks, benefits, expected outcome, and alternative to the recommended procedure have been discussed with the patient. Patient understands and wants to proceed with the procedure.      Electronically signed by Jose Olmedo MD on 6/16/2022 at 11:28 AM

## 2022-06-16 NOTE — PROGRESS NOTES
Pain remains 4/10 in both breasts. VSS. Tolerates PO and sitting up on stretcher. Discharge instructions reviewed with pt and . Both express an understanding of instructions. Pt dressing self. Call light within reach.

## 2022-06-16 NOTE — PROGRESS NOTES
Alert and oriented. Agreeable to procedure. Consent signed by pt.   Electronically signed by Trini Corona RN on 6/16/2022 at 10:19 AM

## 2022-06-16 NOTE — OP NOTE
Operative Note      Patient: Anthony Schmid  YOB: 1955  MRN: [id number]    Date of Procedure: 6/16/2022    Pre-Op Diagnosis: Neoplasm of left breast, primary tumor staging category tis: ductal carcinoma in situ, MALIGNANT NEOPLASM OF UNSPECIFIED SITE OF RIGHT BREAST, right breast papilloma    Post-Op Diagnosis: Same       Procedure(s):  RADIOFREQUENCY IDENTIFICATION LOCALIZED LEFT BREAST LUMPECTOMY, RADIOFREQUENCY IDENTIFIED LOCALIZED RIGHT BREAST LUMPECTOMY, RIGHT AXILLARY SENTINEL NODE BIOPSY WITH RADIOISOTOPE, RFID localized right breast excisional biopsy    Surgeon(s):   Skyla Castañeda MD    Assistant:   Surgical Assistant: Venkatesh Fernando RN    Anesthesia: Monitor Anesthesia Care    Estimated Blood Loss (mL): Minimal    Complications: None    Specimens:   ID Type Source Tests Collected by Time Destination   A : A) Right breast cancer, short stitch superior long stitch lateral Tissue Breast SURGICAL PATHOLOGY Skyla Castañeda MD 6/16/2022 1236    B : B) Right breast, superior, ink is the new true margin Tissue Tissue SURGICAL PATHOLOGY Skyla Castañeda MD 6/16/2022 1238    C : E) Right breast, posterior, ink is the new true margin Tissue Tissue SURGICAL PATHOLOGY Skyla Castañeda MD 6/16/2022 1241    D : D) Right breast, medial, ink is the new true margin Tissue Tissue SURGICAL PATHOLOGY Skyla Castañeda MD 6/16/2022 1240    E : G) Right axillary sentinel node Tissue Tissue SURGICAL PATHOLOGY Skyla Castañeda MD 6/16/2022 1243    F : F) Right breast, lateral, ink is the new true margin Tissue Tissue SURGICAL PATHOLOGY Skyla Castañeda MD 6/16/2022 1242    G : G) Right breast, anterior inferior, ink is the new true margin Tissue Tissue SURGICAL PATHOLOGY Skyla Castañeda MD 6/16/2022 1317    H : H-Right breast excisional biopsy, short stitch superior, long stitch lateral Tissue Breast SURGICAL PATHOLOGY Skyla Castañeda MD 6/16/2022 1302    I : I) cancer. She is here now for RFID localized partial mastectomy and axillary sentinel lymph node biopsy for clinical stage T1N0 ductal carcinoma. She also has a papilloma in the lower outer right breast that radiology also recommended excision of, and this was also tagged preoperatively in radiology. Also, she is here for RFID localized lumpectomy for left DCIS, clinical TisN0. The risks, benefits and alternatives were discussed with the  patient. Questions were answered and she is agreeable to proceed. Ms. Tavia Ronquillo was met by me in the preoperative area. The surgical sites were identified. Consent was obtained. The appropriate breast imaging was reviewed. DESCRIPTION OF OPERATION: The patient was brought to the operating room and  placed on the OR table in the supine position. General anesthesia was  obtained. The patient had radioisotope injection preoperatively per nuclear medicine for identification of the axillary sentinel node. The bilateral breast and axillary regions were then prepped and draped in the usual sterile fashion. Patient had also gone to radiology prior to surgery for RFID localization. An incision was made in the upper inner periareolar border on the right breast and carried down through the skin and subcutaneous tissues. Flaps were created in the subcutanous tissue around the area of the tumor. The tumor was localized using the RFID localizer. The tissue around the tumor was dissected free from the surrounding breast tissue. The specimen was marked with a short suture superiorly, and a long suture laterally, and this was sent to radiology, which confirmed removal of the area in question, and then sent to pathology for permanent section. I took additional margins from all 6 sides, and these were marked with ink on the new true margins and also sent to pathology.      There was a fairly large surgical defect caused by  the removal of the breast tissue which was measured to be 4 cm x 4 cm in size.  This made a defect measuring 16 cm2. It was elected to repair the surgical  defect using an oncoplastic tissue advancement procedure to achieve a breast  reconstruction with local tissue flaps. Tissue mobilization was done by dissecting through the breast parenchyma and  creating a pedicled superior and inferior tissue flap using cautery, and the flaps were  then mobilized and transferred to the central aspect of the surgical cavity. Care was taken to preserve the vascularity of the flaps. I placed a 2 cm low profile Biozorb device into the cavity. The deep tissue was then reapproximated to the Biozorb using interrupted 3-0 PDS suture. The subcutaneous tissue was closed with 3-0 vicryl. Once this was completed, the deformity in the breast was no longer present. I made a separate incision over the tag in the lower outer right breast, and this was carried down through the skin and subcutaneous tissues. Flaps were created in the subcutanous tissue around the area of the papilloma. The mass was localized using the RFID localizer. The tissue around the papilloma was dissected free from the surrounding breast tissue. The specimen was marked with a short suture superiorly, and a long suture laterally, and this was sent to radiology, which confirmed removal of the area in question, and then sent to pathology for permanent section. A transverse incision was then made in the right axilla, and carried down thru the subcutaneous tissues, and the fascia was divided. The gamma probe was used to identify a sentinel lymph node within the axillary region. This node was dissected free from the surrounding tissue, and vessels and lymphatics were divided using the Harmonic scalpel. This node had a count of 847, and a second node had a count of 435, and a third node had a count of 1391, and these were all removed in the same fashion and were sent to pathology as axillary sentinel lymph nodes.  The residual gamma probe activity within the axillary region was minimal. There were no other enlarged lymph nodes palpated within the axillary region. Hemostasis was assured. The wounds were irrigated and injected with local anesthetic, and all wounds were closed with layered vicryl, and the skin was re-approximated with 4-0 Monocryl and covered with surgical glue. Patient was placed into a surgical bra. I then went to the left side, and I changed gloves, and I identified the tags in the left lower quadrant. An incision was made in the lower outer quadrant on the left breast and carried down through the skin and subcutaneous tissues. Flaps were created in the subcutanous tissue around the area of the tumor. The tumor and the papilloma were localized using the RFID localizer. The tissue around the tumor and papilloma was dissected free from the surrounding breast tissue. The specimen was marked with a short suture superiorly, and a long suture laterally, and this was sent to radiology, which confirmed removal of the area in question, and then sent to pathology for permanent section. I took additional margins from all 6 sides, and these were marked with ink on the new true margins and also sent to pathology. There was a fairly large surgical defect caused by  the removal of the breast tissue which was measured to be 4 cm x 3 cm in size. This made a defect measuring 12 cm2. It was elected to repair the surgical  defect using an oncoplastic tissue advancement procedure to achieve a breast  reconstruction with local tissue flaps. Tissue mobilization was done by dissecting through the breast parenchyma and  creating a pedicled superior and inferior tissue flap using cautery, and the flaps were  then mobilized and transferred to the central aspect of the surgical cavity. Care was taken to preserve the vascularity of the flaps. I placed a 2 cm low profile Biozorb device into the cavity.  The deep tissue was then reapproximated to the Biozorb using interrupted 3-0 PDS suture. The subcutaneous tissue was closed with 3-0 vicryl. Once this was completed, the deformity in the breast was no longer present. The wounds were irrigated and injected with local anesthetic, and all wounds were closed with layered vicryl, and the skin was re-approximated with 4-0 Monocryl and covered with surgical glue. Patient was placed into a surgical bra. Sponge, needle and instrument counts were correct per nursing. The patient tolerated the procedure well. She was extubated and taken to the  recovery room in stable condition.       Electronically signed by Hussain Rodrigues MD on 6/16/2022 at 2:29 PM

## 2022-06-21 RX ORDER — FLUCONAZOLE 150 MG/1
150 TABLET ORAL
Qty: 2 TABLET | Refills: 0 | Status: SHIPPED | OUTPATIENT
Start: 2022-06-21 | End: 2022-06-27

## 2022-07-06 ENCOUNTER — PATIENT MESSAGE (OUTPATIENT)
Dept: FAMILY MEDICINE CLINIC | Age: 67
End: 2022-07-06

## 2022-07-06 ENCOUNTER — OFFICE VISIT (OUTPATIENT)
Dept: BREAST CENTER | Age: 67
End: 2022-07-06

## 2022-07-06 VITALS
HEART RATE: 74 BPM | HEIGHT: 69 IN | SYSTOLIC BLOOD PRESSURE: 130 MMHG | WEIGHT: 178 LBS | DIASTOLIC BLOOD PRESSURE: 59 MMHG | OXYGEN SATURATION: 95 % | BODY MASS INDEX: 26.36 KG/M2

## 2022-07-06 DIAGNOSIS — F41.1 GAD (GENERALIZED ANXIETY DISORDER): ICD-10-CM

## 2022-07-06 DIAGNOSIS — Z90.13 S/P PARTIAL MASTECTOMY, BILATERAL: ICD-10-CM

## 2022-07-06 DIAGNOSIS — C50.911 DUCTAL CARCINOMA OF RIGHT BREAST (HCC): Primary | ICD-10-CM

## 2022-07-06 DIAGNOSIS — D05.12 NEOPLASM OF LEFT BREAST, PRIMARY TUMOR STAGING CATEGORY TIS: DUCTAL CARCINOMA IN SITU (DCIS): ICD-10-CM

## 2022-07-06 PROCEDURE — 99024 POSTOP FOLLOW-UP VISIT: CPT | Performed by: SURGERY

## 2022-07-06 RX ORDER — BUSPIRONE HYDROCHLORIDE 15 MG/1
15 TABLET ORAL 2 TIMES DAILY
Qty: 180 TABLET | Refills: 0 | Status: SHIPPED | OUTPATIENT
Start: 2022-07-06 | End: 2022-09-06 | Stop reason: SDUPTHER

## 2022-07-06 NOTE — PROGRESS NOTES
Subjective:      Patient ID: Olivier Rodgers is a 73 y.o. female. HPI   Chief Complaint   Patient presents with    Post-Op Check     Patient is s/p right lumpectomy/snbx 6/16/2022 for T1aN0 ductal carcinoma, ER pos, MN neg, Her2 neg, final path showed no residual invasive carcinoma. 12 mm area of LCIS noted. 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 Wounds healing well, instructed on wound care. F/u 5 months with bilateral diagnostic mammogram.     She saw Dr. Salvatore Louis, recommend AI and radiation oncology consultation.      Past Medical History:   Diagnosis Date    Anxiety state, unspecified     Arthritis     Chronic back pain     Tried spinal injections    Depressive disorder, not elsewhere classified     Duodenal ulcer, unspecified as acute or chronic, without hemorrhage, perforation, or obstruction     Essential hypertension 2/22/2016    Fibromyalgia     GERD (gastroesophageal reflux disease)     Hyperlipidemia     IBS (irritable bowel syndrome)     Seasonal allergies     Sleep disorder        Past Surgical History:   Procedure Laterality Date    ABDOMINAL ADHESION SURGERY      BREAST BIOPSY Bilateral 6/16/2022    RADIOFREQUENCY IDENTIFICATION LOCALIZED LEFT BREAST LUMPECTOMY, RADIOFREQUENCY IDENTIFIED LOCALIZED RIGHT BREAST LUMPECTOMY, RIGHT AXILLARY SENTINEL NODE BIOPSY WITH RADIOISOTOPE, RFID localized right breast excisional biopsy performed by Leighton Whitley MD at [facility]  02/2016    negative    CHOLECYSTECTOMY      COLONOSCOPY  9/13    negative    DENTAL SURGERY      HIATAL HERNIA REPAIR  09/07/2018    LAPAROSCOPIC HIATAL HERNIA, LAPAROSCOPIC NISSEN FUNDOPLICATION WITH MESH    HYSTERECTOMY (CERVIX STATUS UNKNOWN)      LAPAROSCOPIC NISSEN FUNDOPLICATION      ROSELIA BREAST LOC ADDITIONAL LESION LEFT Left 6/10/2022    ROSELIA BREAST LOC ADDITIONAL LESION LEFT 6/10/2022 [facility] [address]    ROSELIA BREAST LOC ADDITIONAL LESION RIGHT Right 6/10/2022    ROSELIA BREAST LOC ADDITIONAL LESION RIGHT 6/10/2022 [facility] [address]    ROSELIA STEROTACTIC LOC BREAST BIOPSY LEFT Left 5/4/2022    ROSELIA STEROTACTIC LOC BREAST BIOPSY LEFT 5/4/2022 [facility] [address]    ROSELIA STEROTACTIC LOC BREAST BIOPSY RIGHT Right 6/1/2022    ROSELIA STEROTACTIC LOC BREAST BIOPSY RIGHT 6/1/2022 [facility] [address]    OVARY REMOVAL      MN NJX DX/THER SBST INTRLMNR LMBR/SAC W/IMG GDN N/A 11/20/2018    LUMBAR L5-S1 EPIDURAL STEROID INJECTION WITH FLUOROSCOPY performed by Luke Stein MD at [facility]  9/13    small paraesoph hernia     US BREAST BIOPSY NEEDLE ADDITIONAL LEFT Left 5/24/2022    US BREAST BIOPSY NEEDLE ADDITIONAL LEFT 5/24/2022 [facility]    US BREAST BIOPSY NEEDLE ADDITIONAL RIGHT Right 5/24/2022    US BREAST BIOPSY NEEDLE ADDITIONAL RIGHT 5/24/2022 [facility]    US BREAST NEEDLE BIOPSY LEFT Left 5/4/2022    US BREAST NEEDLE BIOPSY LEFT 5/4/2022 [facility]    US BREAST NEEDLE BIOPSY LEFT Left 5/24/2022    US BREAST NEEDLE BIOPSY LEFT 5/24/2022 [facility]    US BREAST NEEDLE BIOPSY RIGHT Right 5/24/2022    US BREAST NEEDLE BIOPSY RIGHT 5/24/2022 [facility]       Current Outpatient Medications   Medication Sig Dispense Refill    busPIRone (BUSPAR) 15 MG tablet Take 15 mg by mouth 2 times daily 180 tablet 0    Multiple Vitamin (MULTIVITAMIN ADULT PO) Take 1 tablet by mouth daily      vitamin C (ASCORBIC ACID) 500 MG tablet Take 1,000 mg by mouth daily      Cholecalciferol (VITAMIN D) 50 MCG (2000 UT) CAPS capsule Take 1 capsule by mouth daily      Krill Oil 1000 MG CAPS Take 1 caplet by mouth daily      Glucosamine-Chondroitin-MSM (GLUCOSAMINE CHONDROIT MSM DS PO) Take 1 tablet by mouth daily      Vit H-Uhsyznh-Pdsfno-Bromelain [phone] MG CAPS Take 1 tablet by mouth in the morning and at bedtime      Magnesium 400 MG CAPS Take 1 tablet by mouth in the morning and at bedtime      Potassium Gluconate 595 MG CAPS Take 1 tablet by mouth in the morning and at bedtime      NONFORMULARY Take 1 tablet by mouth in the morning and at bedtime LACTUCA VIROSA-\"WILD LETTUCE'      NONFORMULARY Take 1 Scoop by mouth in the morning and at bedtime KRATOM      hydrOXYzine HCl (ATARAX) 25 MG tablet Take 1-2 tabs every 6 hours as needed itching 120 tablet 0    LORazepam (ATIVAN) 1 MG tablet TAKE 1 TABLET BY MOUTH TWO TO THREE TIMES DAILY AS NEEDED DURING THE DAY AND 2 TABLETS EVERY NIGHT AT BEDTIME FOR A MAXIMUM OF 4 TABLETS DAILY 120 tablet 0    cloNIDine (CATAPRES) 0.1 MG tablet TAKE 1 TABLET BY MOUTH TWICE DAILY 180 tablet 2    dicyclomine (BENTYL) 20 MG tablet TAKE 1 TABLET BY MOUTH EVERY 6 HOURS AS NEEDED 360 tablet 0    Alpha-Lipoic Acid 600 MG TABS Take 1 tablet by mouth in the morning and at bedtime      ibuprofen (ADVIL;MOTRIN) 400 MG tablet TAKE 1 TABLET BY MOUTH EVERY 6 HOURS AS NEEDED FOR PAIN 360 tablet 3    ezetimibe (ZETIA) 10 MG tablet Take 1 tablet by mouth daily (Patient taking differently: Take 10 mg by mouth at bedtime ) 90 tablet 3    atenolol (TENORMIN) 50 MG tablet Take 1 tablet by mouth daily (Patient taking differently: Take 50 mg by mouth at bedtime ) 90 tablet 3    tiZANidine (ZANAFLEX) 4 MG tablet TAKE 1 TABLET BY MOUTH EVERY 8 HOURS AS NEEDED- muscle cramping and pain 270 tablet 3    venlafaxine (EFFEXOR XR) 75 MG extended release capsule Take 3 capsules by mouth daily 270 capsule 3    fluticasone (FLONASE) 50 MCG/ACT nasal spray USE 2 SPRAYS IN EACH NOSTRIL EVERY DAY, SHAKE WELL 144 g 0    triamterene-hydroCHLOROthiazide (MAXZIDE-25) 37.5-25 MG per tablet Take 1 tablet by mouth daily 90 tablet 3    omeprazole (PRILOSEC) 40 MG delayed release capsule TAKE 1 CAPSULE TWICE DAILY (Patient taking differently: daily TAKE 1 CAPSULE TWICE DAILY) 180 capsule 3    albuterol sulfate  (90 Base) MCG/ACT inhaler INHALE 2 PUFFS INTO THE LUNGS EVERY 6 HOURS AS NEEDED FOR WHEEZING 54 g 3    melatonin 3 MG TABS tablet Take 6-9 mg by mouth nightly as needed (sleep)       docusate sodium (COLACE) 100 MG capsule Take 100 mg by mouth 3 times daily as needed for Constipation       No current facility-administered medications for this visit. Social History     Socioeconomic History    Marital status:      Spouse name: Not on file    Number of children: Not on file    Years of education: Not on file    Highest education level: Not on file   Occupational History    Occupation: Disabled     Comment: 2010   Tobacco Use    Smoking status: Current Every Day Smoker     Packs/day: 1.00     Years: 40.00     Pack years: 40.00     Types: Cigarettes    Smokeless tobacco: Never Used    Tobacco comment: 2/3   Vaping Use    Vaping Use: Some days    Substances: Nicotine, Flavoring    Devices: Disposable   Substance and Sexual Activity    Alcohol use: Yes     Alcohol/week: 1.0 standard drink     Types: 1 Glasses of wine per week     Comment: occasionaly    Drug use: Never    Sexual activity: Not Currently   Other Topics Concern    Not on file   Social History Narrative    Not on file     Social Determinants of Health     Financial Resource Strain: Low Risk     Difficulty of Paying Living Expenses: Not hard at all   Food Insecurity: No Food Insecurity    Worried About [facility] in the Last Year: Never true    [facility] in the Last Year: Never true   Transportation Needs:     Lack of Transportation (Medical): Not on file    Lack of Transportation (Non-Medical):  Not on file   Physical Activity: Insufficiently Active    Days of Exercise per Week: 3 days    Minutes of Exercise per Session: 20 min   Stress:     Feeling of Stress : Not on file   Social Connections:     Frequency of Communication with Friends and Family: Not on file    Frequency of Social Gatherings with Friends and Family: Not on file    Attends Confucianism Services: Not on file    Active Member of Clubs or Organizations: Not on file    Attends Club or Organization Meetings: Not on file    Marital Status: Not on file   Intimate Partner Violence:     Fear of Current or Ex-Partner: Not on file    Emotionally Abused: Not on file    Physically Abused: Not on file    Sexually Abused: Not on file   Housing Stability:     Unable to Pay for Housing in the Last Year: Not on file    Number of [place] in the Last Year: Not on file    Unstable Housing in the Last Year: Not on file       Objective:   Physical Exam        Assessment:      Diagnosis Orders   1. Ductal carcinoma of right breast ([facility] [address]     2. 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)     3.  S/P partial mastectomy, bilateral            Plan:     See above       Electronically signed by Tan Malcolm MD on 7/6/2022 at 1:02 PM

## 2022-07-11 ENCOUNTER — PATIENT MESSAGE (OUTPATIENT)
Dept: FAMILY MEDICINE CLINIC | Age: 67
End: 2022-07-11

## 2022-07-11 DIAGNOSIS — F41.1 GAD (GENERALIZED ANXIETY DISORDER): ICD-10-CM

## 2022-07-11 RX ORDER — LORAZEPAM 1 MG/1
TABLET ORAL
Qty: 120 TABLET | Refills: 0 | Status: SHIPPED | OUTPATIENT
Start: 2022-07-11 | End: 2022-08-08

## 2022-07-11 NOTE — TELEPHONE ENCOUNTER
From: Sera Weaver  To: Russell Snow  Sent: 7/11/2022 12:27 PM EDT  Subject: Ativan     Should have been filled 7/10, its been sitting there for a few days, Thursday, sent for it early so they would have time to contact you, friday. But...nooooo. Can you call in for it? Thanks!  Kim

## 2022-07-12 ENCOUNTER — PATIENT MESSAGE (OUTPATIENT)
Dept: BREAST CENTER | Age: 67
End: 2022-07-12

## 2022-07-12 DIAGNOSIS — R10.9 ABDOMINAL CRAMPING: ICD-10-CM

## 2022-07-12 DIAGNOSIS — J30.2 SEASONAL ALLERGIES: ICD-10-CM

## 2022-07-12 RX ORDER — DICYCLOMINE HCL 20 MG
TABLET ORAL
Qty: 360 TABLET | Refills: 0 | Status: SHIPPED | OUTPATIENT
Start: 2022-07-12 | End: 2022-10-13

## 2022-07-12 RX ORDER — HYDROXYZINE HYDROCHLORIDE 25 MG/1
TABLET, FILM COATED ORAL
Qty: 120 TABLET | Refills: 0 | Status: SHIPPED | OUTPATIENT
Start: 2022-07-12 | End: 2022-08-16

## 2022-07-13 DIAGNOSIS — T36.95XA ANTIBIOTIC-INDUCED YEAST INFECTION: ICD-10-CM

## 2022-07-13 DIAGNOSIS — B37.9 ANTIBIOTIC-INDUCED YEAST INFECTION: ICD-10-CM

## 2022-07-13 RX ORDER — FLUCONAZOLE 150 MG/1
150 TABLET ORAL
Qty: 2 TABLET | Refills: 0 | OUTPATIENT
Start: 2022-07-13 | End: 2022-07-19

## 2022-07-13 NOTE — TELEPHONE ENCOUNTER
I called patient and she stated that the pain is minor, she just wanted to make sure that this was normal. She was advised to ice it 2-3 times a day and to alternate with tylenol and ibuprofen. She denies any swelling, redness or warmth. If the pain worsens she will call the office.

## 2022-07-13 NOTE — TELEPHONE ENCOUNTER
From: Sera Weaver  To: Dr. Antonia Hogan: 7/12/2022 2:57 PM EDT  Subject: Right axilla    There is a, tight tendon thing in my armpit. I'm thinking it is probably where you took the lymph node? It hurts when I raise my arm and to touch.  Is that normal? Thanks, Kim

## 2022-07-19 NOTE — TELEPHONE ENCOUNTER
I called patient to check on how she was feeling. Ileana Emery is still a little tender/ tight it's better. I will continue to stretch and massage the area. \"      \"I will have 5 visits localized radiation treatments. \"        Clotilde Papa will call back with any questions or concerns.        Thanks, Tian Anton

## 2022-07-20 ENCOUNTER — PATIENT MESSAGE (OUTPATIENT)
Dept: FAMILY MEDICINE CLINIC | Age: 67
End: 2022-07-20

## 2022-07-20 DIAGNOSIS — I10 ESSENTIAL HYPERTENSION: ICD-10-CM

## 2022-07-20 RX ORDER — CLONIDINE HYDROCHLORIDE 0.1 MG/1
TABLET ORAL
Qty: 180 TABLET | Refills: 2 | Status: SHIPPED | OUTPATIENT
Start: 2022-07-20 | End: 2022-10-03 | Stop reason: SDUPTHER

## 2022-07-20 NOTE — TELEPHONE ENCOUNTER
From: Sera Weaver  To: Angelito Chacon  Sent: 7/20/2022 8:51 AM EDT  Subject: Clonadine    Hi doc, Clonadine is still off from missing a refill for Richard Ramirez. Mine is due 8/14, but I will need it by the end of the week if you could order it please.  Thanks

## 2022-08-08 DIAGNOSIS — F41.1 GAD (GENERALIZED ANXIETY DISORDER): ICD-10-CM

## 2022-08-08 RX ORDER — LORAZEPAM 1 MG/1
TABLET ORAL
Qty: 120 TABLET | Refills: 0 | Status: SHIPPED | OUTPATIENT
Start: 2022-08-10 | End: 2022-09-06 | Stop reason: SDUPTHER

## 2022-08-16 DIAGNOSIS — J30.2 SEASONAL ALLERGIES: ICD-10-CM

## 2022-08-16 RX ORDER — HYDROXYZINE HYDROCHLORIDE 25 MG/1
TABLET, FILM COATED ORAL
Qty: 120 TABLET | Refills: 0 | Status: SHIPPED | OUTPATIENT
Start: 2022-08-16 | End: 2022-09-06 | Stop reason: SDUPTHER

## 2022-08-23 ENCOUNTER — HOSPITAL ENCOUNTER (OUTPATIENT)
Dept: GENERAL RADIOLOGY | Age: 67
Discharge: HOME | End: 2022-08-23
Payer: MEDICARE

## 2022-08-23 DIAGNOSIS — N95.8 POSTARTIFICIAL MENOPAUSAL SYNDROME: ICD-10-CM

## 2022-08-23 PROCEDURE — 77080 DXA BONE DENSITY AXIAL: CPT

## 2022-09-06 ENCOUNTER — OFFICE VISIT (OUTPATIENT)
Dept: FAMILY MEDICINE CLINIC | Age: 67
End: 2022-09-06
Payer: MEDICAID

## 2022-09-06 VITALS
HEART RATE: 65 BPM | DIASTOLIC BLOOD PRESSURE: 60 MMHG | WEIGHT: 181 LBS | HEIGHT: 69 IN | OXYGEN SATURATION: 99 % | BODY MASS INDEX: 26.81 KG/M2 | SYSTOLIC BLOOD PRESSURE: 120 MMHG

## 2022-09-06 DIAGNOSIS — Z23 NEED FOR SHINGLES VACCINE: ICD-10-CM

## 2022-09-06 DIAGNOSIS — J30.2 SEASONAL ALLERGIES: ICD-10-CM

## 2022-09-06 DIAGNOSIS — F41.1 GAD (GENERALIZED ANXIETY DISORDER): Primary | ICD-10-CM

## 2022-09-06 DIAGNOSIS — Z71.6 ENCOUNTER FOR TOBACCO USE CESSATION COUNSELING: ICD-10-CM

## 2022-09-06 DIAGNOSIS — I10 ESSENTIAL HYPERTENSION: ICD-10-CM

## 2022-09-06 PROCEDURE — G8417 CALC BMI ABV UP PARAM F/U: HCPCS | Performed by: NURSE PRACTITIONER

## 2022-09-06 PROCEDURE — G8427 DOCREV CUR MEDS BY ELIG CLIN: HCPCS | Performed by: NURSE PRACTITIONER

## 2022-09-06 PROCEDURE — 1123F ACP DISCUSS/DSCN MKR DOCD: CPT | Performed by: NURSE PRACTITIONER

## 2022-09-06 PROCEDURE — 3017F COLORECTAL CA SCREEN DOC REV: CPT | Performed by: NURSE PRACTITIONER

## 2022-09-06 PROCEDURE — 99406 BEHAV CHNG SMOKING 3-10 MIN: CPT | Performed by: NURSE PRACTITIONER

## 2022-09-06 PROCEDURE — G8399 PT W/DXA RESULTS DOCUMENT: HCPCS | Performed by: NURSE PRACTITIONER

## 2022-09-06 PROCEDURE — 4004F PT TOBACCO SCREEN RCVD TLK: CPT | Performed by: NURSE PRACTITIONER

## 2022-09-06 PROCEDURE — 99214 OFFICE O/P EST MOD 30 MIN: CPT | Performed by: NURSE PRACTITIONER

## 2022-09-06 PROCEDURE — 1090F PRES/ABSN URINE INCON ASSESS: CPT | Performed by: NURSE PRACTITIONER

## 2022-09-06 PROCEDURE — G9899 SCRN MAM PERF RSLTS DOC: HCPCS | Performed by: NURSE PRACTITIONER

## 2022-09-06 RX ORDER — HYDROXYZINE HYDROCHLORIDE 25 MG/1
TABLET, FILM COATED ORAL
Qty: 120 TABLET | Refills: 0 | Status: SHIPPED | OUTPATIENT
Start: 2022-09-06

## 2022-09-06 RX ORDER — TRIAMTERENE AND HYDROCHLOROTHIAZIDE 37.5; 25 MG/1; MG/1
1 TABLET ORAL DAILY
Qty: 90 TABLET | Refills: 3 | Status: SHIPPED | OUTPATIENT
Start: 2022-09-06

## 2022-09-06 RX ORDER — LORAZEPAM 1 MG/1
TABLET ORAL
Qty: 120 TABLET | Refills: 0 | Status: SHIPPED | OUTPATIENT
Start: 2022-09-11 | End: 2022-10-13

## 2022-09-06 RX ORDER — BUSPIRONE HYDROCHLORIDE 15 MG/1
15 TABLET ORAL 2 TIMES DAILY
Qty: 180 TABLET | Refills: 3 | Status: SHIPPED | OUTPATIENT
Start: 2022-09-06 | End: 2022-10-07

## 2022-09-06 RX ORDER — CITALOPRAM 10 MG/1
10 TABLET ORAL DAILY
Qty: 90 TABLET | Refills: 3 | Status: SHIPPED | OUTPATIENT
Start: 2022-09-06

## 2022-09-06 ASSESSMENT — ENCOUNTER SYMPTOMS
SINUS PAIN: 0
SINUS PRESSURE: 0
COLOR CHANGE: 0
NAUSEA: 0
ABDOMINAL PAIN: 0
SHORTNESS OF BREATH: 0
EYE DISCHARGE: 0
ABDOMINAL DISTENTION: 0
COUGH: 0
CONSTIPATION: 0
CHEST TIGHTNESS: 0
BACK PAIN: 0
DIARRHEA: 0

## 2022-09-06 NOTE — PROGRESS NOTES
Date of Service:  2022    Attila Weaver (:  1955) is a 78 y.o. female, here for evaluation of the following medical concerns:    Chief Complaint   Patient presents with    Anxiety     ANXIETY FOLLOW UP - DRUG SCREEN AND [facility] NOT DUE UNTIL December     Other     PT WOULD LIKE SHINGLES VAC. HPI    Anxiety  Patient here today for 3 month follow up on anxiety med refill. Pt uses ativan PRN for anxiety. Pt taking buspar 15 mg BID. Did not tolerate effexor with buspar. Wants to restart celexa again, liked it better than effexor. Takes effexor in the morning and buspar a little bit later after it. Start on low dose celexa and work to increase it over the next few weeks and get off effexor. Pt takes hydroxyzine 1-2 tabs at night for anxiety and to help with sleep. Pt says busapr definitely helps with anxiety. Pt was unable to find anyone to start therapy, unable to fine therapist. Pt has been undergoing treatment for breast cancer. Pt had sx for breast cancer. Had 10 radiation treatments. Oncolologist wants her to take a medication that she is not willing to take and she is not interested and side effects she read are awful. Review of Systems   Constitutional:  Negative for activity change, appetite change, fatigue, fever and unexpected weight change. HENT:  Negative for congestion, ear pain, sinus pressure and sinus pain. Eyes:  Negative for discharge and visual disturbance. Respiratory:  Negative for cough, chest tightness and shortness of breath. Cardiovascular:  Negative for chest pain, palpitations and leg swelling. Gastrointestinal:  Negative for abdominal distention, abdominal pain, constipation, diarrhea and nausea. Endocrine: Negative for cold intolerance, heat intolerance, polydipsia, polyphagia and polyuria. Genitourinary:  Negative for decreased urine volume, difficulty urinating, dysuria, flank pain, frequency and urgency.    Musculoskeletal:  Negative for arthralgias, back pain, gait problem, joint swelling, myalgias and neck pain. Skin:  Negative for color change, rash and wound. Allergic/Immunologic: Negative for food allergies and immunocompromised state. Neurological:  Negative for dizziness, tremors, speech difficulty, weakness, light-headedness, numbness and headaches. Hematological:  Negative for adenopathy. Does not bruise/bleed easily. Psychiatric/Behavioral:  Negative for confusion, decreased concentration, self-injury, sleep disturbance and suicidal ideas. The patient is not nervous/anxious. Prior to Visit Medications    Medication Sig Taking?  Authorizing Provider   hydrOXYzine HCl (ATARAX) 25 MG tablet TAKE 1 TO 2 TABLETS BY MOUTH EVERY 6 HOURS AS NEEDED FOR ITCHING AND ANXIETY AND INSOMNIA Yes GRIS Lord CNP   LORazepam (ATIVAN) 1 MG tablet TAKE 1 TABLET BY MOUTH TWICE DAILY TO THREE TIMES DAILY AS NEEDED DURING THE DAY AND 2 TABLETS EVERY NIGHT AT BEDTIME FOR A MAXIMUM OF 4 TABLETS DAILY Yes GRIS Lord CNP   citalopram (CELEXA) 10 MG tablet Take 1 tablet by mouth daily Yes GRIS Lord CNP   busPIRone (BUSPAR) 15 MG tablet Take 15 mg by mouth 2 times daily Yes GRIS Lord CNP   triamterene-hydroCHLOROthiazide (MAXZIDE-25) 37.5-25 MG per tablet Take 1 tablet by mouth daily Yes GRIS Lord CNP   cloNIDine (CATAPRES) 0.1 MG tablet TAKE 1 TABLET BY MOUTH TWICE DAILY Yes GRIS Lord CNP   dicyclomine (BENTYL) 20 MG tablet TAKE 1 TABLET BY MOUTH EVERY 6 HOURS AS NEEDED Yes GRIS Lord CNP   Multiple Vitamin (MULTIVITAMIN ADULT PO) Take 1 tablet by mouth daily Yes Historical Provider, MD   vitamin C (ASCORBIC ACID) 500 MG tablet Take 1,000 mg by mouth daily Yes Historical Provider, MD   Cholecalciferol (VITAMIN D) 50 MCG (2000 UT) CAPS capsule Take 1 capsule by mouth daily Yes Historical Provider, MD Krill Oil 1000 MG CAPS Take 1 caplet by mouth daily Yes Historical Provider, MD   Glucosamine-Chondroitin-MSM (GLUCOSAMINE CHONDROIT MSM DS PO) Take 1 tablet by mouth daily Yes Historical Provider, MD   Vit U-Lqgoixl-Sfprjz-Bromelain [phone] MG CAPS Take 1 tablet by mouth in the morning and at bedtime Yes Historical Provider, MD   Magnesium 400 MG CAPS Take 1 tablet by mouth in the morning and at bedtime Yes Historical Provider, MD   Potassium Gluconate 595 MG CAPS Take 1 tablet by mouth in the morning and at bedtime Yes Historical Provider, MD   NONFORMULARY Take 1 tablet by mouth in the morning and at bedtime LACTUCA VIROSA-\"WILD LETTUCE' Yes Historical Provider, MD   NONFORMULARY Take 1 Scoop by mouth in the morning and at bedtime KRATOM Yes Historical Provider, MD   Alpha-Lipoic Acid 600 MG TABS Take 1 tablet by mouth in the morning and at bedtime Yes Historical Provider, MD   ibuprofen (ADVIL;MOTRIN) 400 MG tablet TAKE 1 TABLET BY MOUTH EVERY 6 HOURS AS NEEDED FOR PAIN Yes Stacey Ling MD   ezetimibe (ZETIA) 10 MG tablet Take 1 tablet by mouth daily  Patient taking differently: Take 10 mg by mouth at bedtime Yes GRIS Garland CNP   atenolol (TENORMIN) 50 MG tablet Take 1 tablet by mouth daily  Patient taking differently: Take 50 mg by mouth at bedtime Yes GRIS Garland CNP   tiZANidine (ZANAFLEX) 4 MG tablet TAKE 1 TABLET BY MOUTH EVERY 8 HOURS AS NEEDED- muscle cramping and pain Yes GRIS Garland CNP   venlafaxine (EFFEXOR XR) 75 MG extended release capsule Take 3 capsules by mouth daily Yes GRIS Garland CNP   fluticasone (FLONASE) 50 MCG/ACT nasal spray USE 2 SPRAYS IN EACH NOSTRIL EVERY DAY, SHAKE WELL Yes GRIS Garland CNP   omeprazole (PRILOSEC) 40 MG delayed release capsule TAKE 1 CAPSULE TWICE DAILY  Patient taking differently: daily TAKE 1 CAPSULE TWICE DAILY Yes Rogerio Gann MD   albuterol sulfate HFA 108 (90 Base) MCG/ACT inhaler INHALE 2 PUFFS INTO THE LUNGS EVERY 6 HOURS AS NEEDED FOR WHEEZING Yes Dino Ortega MD   melatonin 3 MG TABS tablet Take 6-9 mg by mouth nightly as needed (sleep)  Yes Historical Provider, MD   docusate sodium (COLACE) 100 MG capsule Take 100 mg by mouth 3 times daily as needed for Constipation Yes Historical Provider, MD        Social History     Tobacco Use    Smoking status: Every Day     Packs/day: 1.00     Years: 40.00     Pack years: 40.00     Types: Cigarettes    Smokeless tobacco: Never    Tobacco comments:     2/3   Substance Use Topics    Alcohol use: Yes     Alcohol/week: 1.0 standard drink     Types: 1 Glasses of wine per week     Comment: occasionaly        Vitals:    09/06/22 1108   BP: 120/60   Site: Right Upper Arm   Position: Sitting   Cuff Size: Large Adult   Pulse: 65   SpO2: 99%   Weight: 181 lb (82.1 kg)  Comment: shoes on   Height: 5' 8.5\" (1.74 m)     Estimated body mass index is 27.12 kg/m² as calculated from the following:    Height as of this encounter: 5' 8.5\" (1.74 m). Weight as of this encounter: 181 lb (82.1 kg). Physical Exam  Vitals reviewed. Constitutional:       General: She is awake. Appearance: Normal appearance. She is well-developed, well-groomed and overweight. She is not ill-appearing. HENT:      Head: Normocephalic and atraumatic. Right Ear: Hearing, tympanic membrane, ear canal and external ear normal.      Left Ear: Hearing, tympanic membrane, ear canal and external ear normal.      Nose: Nose normal.      Mouth/Throat:      Lips: Pink. Mouth: Mucous membranes are moist.      Pharynx: Oropharynx is clear. Eyes:      General: Lids are normal.      Extraocular Movements: Extraocular movements intact. Conjunctiva/sclera: Conjunctivae normal.      Pupils: Pupils are equal, round, and reactive to light. Neck:      Thyroid: No thyromegaly. Vascular: No carotid bruit.    Cardiovascular:      Rate and Rhythm: Normal rate. Pulses:           Carotid pulses are 2+ on the right side and 2+ on the left side. Radial pulses are 2+ on the right side and 2+ on the left side. Posterior tibial pulses are 2+ on the right side and 2+ on the left side. Heart sounds: Normal heart sounds, S1 normal and S2 normal. No murmur heard. Pulmonary:      Effort: Pulmonary effort is normal.      Breath sounds: Normal breath sounds. Chest:   Breasts:     Right: No supraclavicular adenopathy. Left: No supraclavicular adenopathy. Abdominal:      General: Bowel sounds are normal. There is no abdominal bruit. Palpations: Abdomen is soft. Tenderness: There is no abdominal tenderness. Genitourinary:     Comments: Deferred  Musculoskeletal:         General: Normal range of motion. Cervical back: Full passive range of motion without pain, normal range of motion and neck supple. Right lower leg: No edema. Left lower leg: No edema. Lymphadenopathy:      Head:      Right side of head: No submental, submandibular, tonsillar, preauricular, posterior auricular or occipital adenopathy. Left side of head: No submental, submandibular, tonsillar, preauricular, posterior auricular or occipital adenopathy. Cervical: No cervical adenopathy. Right cervical: No superficial, deep or posterior cervical adenopathy. Left cervical: No superficial, deep or posterior cervical adenopathy. Upper Body:      Right upper body: No supraclavicular adenopathy. Left upper body: No supraclavicular adenopathy. Skin:     General: Skin is warm and dry. Capillary Refill: Capillary refill takes less than 2 seconds. Neurological:      General: No focal deficit present. Mental Status: She is alert and oriented to person, place, and time. Mental status is at baseline. Sensory: Sensation is intact. Motor: Motor function is intact. Coordination: Coordination is intact. Gait: Gait is intact. Psychiatric:         Attention and Perception: Attention and perception normal.         Mood and Affect: Mood and affect normal.         Speech: Speech normal.         Behavior: Behavior normal. Behavior is cooperative. Thought Content: Thought content normal.         Cognition and Memory: Cognition and memory normal.         Judgment: Judgment normal.       ASSESSMENT/PLAN:  1. KEVIN (generalized anxiety disorder)  -     LORazepam (ATIVAN) 1 MG tablet; TAKE 1 TABLET BY MOUTH TWICE DAILY TO THREE TIMES DAILY AS NEEDED DURING THE DAY AND 2 TABLETS EVERY NIGHT AT BEDTIME FOR A MAXIMUM OF 4 TABLETS DAILY, Disp-120 tablet, R-0Normal  -     citalopram (CELEXA) 10 MG tablet; Take 1 tablet by mouth daily, Disp-90 tablet, R-3Normal  -     busPIRone (BUSPAR) 15 MG tablet; Take 15 mg by mouth 2 times daily, Disp-180 tablet, R-3Normal   Stop effexor after 1 week overlap with celexa, increase celexa after 1-2 weeks to 20 mg (previous dosing)   Low concern for suicide   Discussed self care   Encourage counseling   Continue buspar dose   PRN hydroxyzine  [facility] reviewed   Low concern for misuse or abuse  2. Seasonal allergies  -     hydrOXYzine HCl (ATARAX) 25 MG tablet; TAKE 1 TO 2 TABLETS BY MOUTH EVERY 6 HOURS AS NEEDED FOR ITCHING AND ANXIETY AND INSOMNIA, Disp-120 tablet, R-0Normal  3. Need for shingles vaccine   Shingles script for pharmacy- shingrix at pharmacy, 2 doses discussed  4. Essential hypertension  -     triamterene-hydroCHLOROthiazide (MAXZIDE-25) 37.5-25 MG per tablet; Take 1 tablet by mouth daily, Disp-90 tablet, R-3Normal     BP well controlled at visit today   Continue current management   Clonidine 0.1 mg BID   Follow a low sodium diet   Physical activity 150 minutes weekly recommended    Weight loss, initial goal 10% of body weight recommended  5.  Encounter for tobacco use cessation counseling   3 minutes spent discussing tobacco cessation   Wellbutrin, chantix, nicotine patches/inhaler   Has quit before   Wants to quit but has a hard time   Quit 7-8 years in past      Care Gaps Addressed  Call insurance company to discuss coverage for shingles vaccine (Shingrix) 2 dose series   TDAP vaccine recommended- call insurance to discuss coverage  COVID booster recommended  Flu vaccine recommended   AWV due 2023  [place] 2018- 3 year follow up was recommended due to fair bowel prep    I have reviewed patient's pertinent medical history, relevant laboratory and imaging studies, and past/future health maintenance. Discussed with the patient the importance of adhering to their current medication regimen as directed. Advised the patient that they should continue to work on eating a healthy balanced diet and staying active by exercising within their personal limits. Orders as listed above. Patient was advised to keep future appointments with their respective specialty care team(s). Patient had the opportunity to ask questions, all of which were answered to the best of my ability and with patient satisfaction. Patient understands and is agreeable with the care plan following today's visit. Patient is to schedule an appointment for any new or worsening symptoms. Go to ER for significant shortness of breath, chest pain, or uncontrolled pain or fever. I discussed with patient the risk and benefits of any medications that were prescribed today. I verified that the patient understands their medications, labs, and/or procedures. The patient is doing well with current medication regimen and does not have any barriers to adherence. The patient's self-management abilities are good. Return in about 3 months (around 12/6/2022) for Anxiety Follow Up. An electronic signature was used to authenticate this note.     --GRIS Waite CNP on 9/6/2022 at 11:36 AM

## 2022-09-16 ENCOUNTER — PATIENT MESSAGE (OUTPATIENT)
Dept: FAMILY MEDICINE CLINIC | Age: 67
End: 2022-09-16

## 2022-09-27 DIAGNOSIS — E78.2 MIXED HYPERLIPIDEMIA: ICD-10-CM

## 2022-09-27 RX ORDER — EZETIMIBE 10 MG/1
10 TABLET ORAL DAILY
Qty: 90 TABLET | Refills: 0 | Status: SHIPPED | OUTPATIENT
Start: 2022-09-27

## 2022-10-01 DIAGNOSIS — J30.2 SEASONAL ALLERGIES: ICD-10-CM

## 2022-10-03 ENCOUNTER — PATIENT MESSAGE (OUTPATIENT)
Dept: FAMILY MEDICINE CLINIC | Age: 67
End: 2022-10-03

## 2022-10-03 DIAGNOSIS — I10 ESSENTIAL HYPERTENSION: ICD-10-CM

## 2022-10-03 RX ORDER — FLUTICASONE PROPIONATE 50 MCG
SPRAY, SUSPENSION (ML) NASAL
Qty: 48 G | Refills: 5 | Status: SHIPPED | OUTPATIENT
Start: 2022-10-03

## 2022-10-03 RX ORDER — CLONIDINE HYDROCHLORIDE 0.1 MG/1
TABLET ORAL
Qty: 270 TABLET | Refills: 2 | Status: SHIPPED | OUTPATIENT
Start: 2022-10-03

## 2022-10-03 NOTE — TELEPHONE ENCOUNTER
From: Partha Lund  To: Yassine Butler  Sent: 10/3/2022 7:36 AM EDT  Subject: Clonadine    Well, this howard Clonadine is still not right. I haven't taken more than 2-3 extra for increased anxiety In the past 2 months. Courtney Lizarraga never does. I still swear I missed a month of his. Anyhoo, both of our refills are due 10/16. We have enough through tomorrow. [facility] won't fill for [employer], and can't get Setup portal right. So, we need that. I have found a shrink group on [facility]. That my neighbor told me about. Will call them today. I got the shot in my back Friday from . Neurologist did it, not pain doc. Seems better, for now. Elsi White, that's it.  Have a good week!! Kim

## 2022-10-07 DIAGNOSIS — F41.1 GAD (GENERALIZED ANXIETY DISORDER): ICD-10-CM

## 2022-10-07 RX ORDER — BUSPIRONE HYDROCHLORIDE 15 MG/1
TABLET ORAL
Qty: 180 TABLET | Refills: 0 | Status: SHIPPED | OUTPATIENT
Start: 2022-10-07

## 2022-10-07 RX ORDER — OMEPRAZOLE 40 MG/1
CAPSULE, DELAYED RELEASE ORAL
Qty: 180 CAPSULE | Refills: 3 | Status: SHIPPED | OUTPATIENT
Start: 2022-10-07

## 2022-10-07 NOTE — TELEPHONE ENCOUNTER
LV 9/6/22 WITH CB NV 12/6/22  busPIRone (BUSPAR) 15 MG tablet 180 tablet 3 9/6/2022     Sig - Route:  Take 15 mg by mouth 2 times daily - Oral    Sent to pharmacy as: busPIRone HCl 15 MG Oral Tablet (BUSPAR)    E-Prescribing Status: Receipt confirmed by pharmacy (9/6/2022 79:13 AM EDT)    DUPLICATE ORDER

## 2022-10-13 DIAGNOSIS — F41.1 GAD (GENERALIZED ANXIETY DISORDER): ICD-10-CM

## 2022-10-13 DIAGNOSIS — R10.9 ABDOMINAL CRAMPING: ICD-10-CM

## 2022-10-13 RX ORDER — DICYCLOMINE HCL 20 MG
TABLET ORAL
Qty: 360 TABLET | Refills: 0 | Status: SHIPPED | OUTPATIENT
Start: 2022-10-13

## 2022-10-13 RX ORDER — LORAZEPAM 1 MG/1
TABLET ORAL
Qty: 120 TABLET | Refills: 0 | Status: SHIPPED | OUTPATIENT
Start: 2022-10-13 | End: 2022-11-12

## 2022-10-21 ENCOUNTER — PATIENT MESSAGE (OUTPATIENT)
Dept: FAMILY MEDICINE CLINIC | Age: 67
End: 2022-10-21

## 2022-10-21 DIAGNOSIS — R42 DIZZINESS: Primary | ICD-10-CM

## 2022-10-21 DIAGNOSIS — E87.1 HYPONATREMIA: ICD-10-CM

## 2022-10-21 NOTE — TELEPHONE ENCOUNTER
From: Sera Weaver  To: Rachel Lionel  Sent: 10/21/2022 9:43 AM EDT  Subject: Weird symptom problem    Hi, last. Friday till Monday, I didn't have my maxide. I forgot to go pick it up. Anyhoo, from Sunday till today, I've held most of my supplements till today. Feeling pretty better but I still have the rash and bad balance. And my eyesight is wonky. Focus issues. I was thinking might check renal functions? Gets worse as the day goes on. Blood pressure has been normal for me. Let me know, I live close. Could be something else, like extreme weather changes, I do get miserable when that happens, but this is a little different fro for me.  Thanks, Kim

## 2022-11-02 DIAGNOSIS — I10 ESSENTIAL HYPERTENSION: ICD-10-CM

## 2022-11-02 RX ORDER — ATENOLOL 50 MG/1
50 TABLET ORAL DAILY
Qty: 90 TABLET | Refills: 3 | Status: SHIPPED | OUTPATIENT
Start: 2022-11-02

## 2022-11-02 NOTE — TELEPHONE ENCOUNTER
Last OV: 9/6/2022 Return in about 3 months (around 12/6/2022) for Anxiety Follow Up.       Next OV: With Family Whpxgvts20/06/2022 at 10:00 AM

## 2022-11-15 DIAGNOSIS — F41.1 GAD (GENERALIZED ANXIETY DISORDER): ICD-10-CM

## 2022-11-15 RX ORDER — LORAZEPAM 1 MG/1
TABLET ORAL
Qty: 120 TABLET | Refills: 0 | Status: SHIPPED | OUTPATIENT
Start: 2022-11-15 | End: 2022-12-15

## 2022-11-17 ENCOUNTER — TELEPHONE (OUTPATIENT)
Dept: FAMILY MEDICINE CLINIC | Age: 67
End: 2022-11-17

## 2022-11-17 DIAGNOSIS — R06.02 SHORTNESS OF BREATH: Primary | ICD-10-CM

## 2022-11-17 RX ORDER — AZITHROMYCIN 250 MG/1
250 TABLET, FILM COATED ORAL SEE ADMIN INSTRUCTIONS
Qty: 6 TABLET | Refills: 0 | Status: SHIPPED | OUTPATIENT
Start: 2022-11-17 | End: 2022-11-22

## 2022-11-17 NOTE — TELEPHONE ENCOUNTER
PATIENT CALLING IN FOR POSSIBLE PNEUMONIA, SOB, WHEEZING, FATIGUED, COUGHING, NO FEVER O2: 98. COVID TEST NEG X2, AND ONGOING OVER 1 WEEK. NO AVAILIBLE APPTS TODAY, CAN WE ORDER A STAT CHEST XRAY AND POSSIBLE ANTIBILOTICS?  ETELVINA

## 2022-11-21 DIAGNOSIS — F41.8 DEPRESSION WITH ANXIETY: ICD-10-CM

## 2022-11-21 DIAGNOSIS — M47.816 FACET ARTHROPATHY, LUMBAR: ICD-10-CM

## 2022-11-21 RX ORDER — TIZANIDINE 4 MG/1
TABLET ORAL
Qty: 270 TABLET | Refills: 0 | Status: SHIPPED | OUTPATIENT
Start: 2022-11-21

## 2022-11-22 ENCOUNTER — PATIENT MESSAGE (OUTPATIENT)
Dept: FAMILY MEDICINE CLINIC | Age: 67
End: 2022-11-22

## 2022-11-22 DIAGNOSIS — F41.8 DEPRESSION WITH ANXIETY: Primary | ICD-10-CM

## 2022-11-22 NOTE — TELEPHONE ENCOUNTER
From: Sera Weaver  To: Harsh Marcos  Sent: 11/22/2022 11:11 AM EST  Subject: Menopause    What in the [place] can I do about these hormonal issues. I hate everything and I cry every five minutes!! Not fatigued, exhausted. .. But can't sleep. .this is why I hadn't stopped that little dose of estrogen. The Celexa didn't go well with buspar, so right now just have buspar cause I can't take the seroquel [employer] gave me to see if I could sleep. Made me dizzy but not sleep. The one [id number] with the buspar was ok. Can you order that back for me for now?  Thanks, Kim

## 2022-11-23 RX ORDER — VENLAFAXINE HYDROCHLORIDE 75 MG/1
75 CAPSULE, EXTENDED RELEASE ORAL DAILY
Qty: 90 CAPSULE | Refills: 3 | Status: SHIPPED | OUTPATIENT
Start: 2022-11-23

## 2022-12-06 ENCOUNTER — PATIENT MESSAGE (OUTPATIENT)
Dept: FAMILY MEDICINE CLINIC | Age: 67
End: 2022-12-06

## 2022-12-06 ENCOUNTER — OFFICE VISIT (OUTPATIENT)
Dept: FAMILY MEDICINE CLINIC | Age: 67
End: 2022-12-06

## 2022-12-06 VITALS
DIASTOLIC BLOOD PRESSURE: 72 MMHG | OXYGEN SATURATION: 97 % | HEIGHT: 69 IN | WEIGHT: 180 LBS | SYSTOLIC BLOOD PRESSURE: 130 MMHG | BODY MASS INDEX: 26.66 KG/M2 | HEART RATE: 72 BPM

## 2022-12-06 DIAGNOSIS — R23.2 HOT FLASHES: ICD-10-CM

## 2022-12-06 DIAGNOSIS — Z02.89 MEDICATION MANAGEMENT CONTRACT SIGNED: ICD-10-CM

## 2022-12-06 DIAGNOSIS — F41.1 GAD (GENERALIZED ANXIETY DISORDER): Primary | ICD-10-CM

## 2022-12-06 DIAGNOSIS — Z79.899 HIGH RISK MEDICATION USE: ICD-10-CM

## 2022-12-06 RX ORDER — LORAZEPAM 1 MG/1
TABLET ORAL
Qty: 120 TABLET | Refills: 0 | Status: SHIPPED | OUTPATIENT
Start: 2022-12-15 | End: 2023-01-05

## 2022-12-06 RX ORDER — QUETIAPINE FUMARATE 25 MG/1
TABLET, FILM COATED ORAL
COMMUNITY
Start: 2022-11-16 | End: 2022-12-06

## 2022-12-06 ASSESSMENT — ENCOUNTER SYMPTOMS
ABDOMINAL PAIN: 0
ABDOMINAL DISTENTION: 0
CHEST TIGHTNESS: 0
COUGH: 0
SINUS PRESSURE: 0
SINUS PAIN: 0
NAUSEA: 0
EYE DISCHARGE: 0
DIARRHEA: 0
BACK PAIN: 0
SHORTNESS OF BREATH: 0
CONSTIPATION: 0
COLOR CHANGE: 0

## 2022-12-06 NOTE — PROGRESS NOTES
Date of Service:  2022    Ramírez Weaver (:  1955) is a 78 y.o. female, here for evaluation of the following medical concerns:    Chief Complaint   Patient presents with    Anxiety     ANXIETY FOLLOW UP/MED CHECK         HPI    Anxiety  Pt here today for anxiety follow up. Pt on ativan 1 mg up to 4 times daily. Discussed she could do 3 times daily and use 2 tabs at night to be able to try and aid in her sleep but says this does not help. Pt struggles with sleep. Pt is following with oncologist for breast cancer and had to stop her estrogen and is struggling with her insomnia and hot flashes and also follows with psychiatrist and has tried seroquel and that did help either. Pt is back on low dose effexor to help with her anxiety as well because she was very anxious and crying all the time. Pt using melatonin. Pt due for UDS today, last one was 2021. Pt has medical marijuana, reviewed OARRS report. Reports medical marijuana use on occasion. Review of Systems   Constitutional:  Negative for activity change, appetite change, fatigue, fever and unexpected weight change. HENT:  Negative for congestion, ear pain, sinus pressure and sinus pain. Eyes:  Negative for discharge and visual disturbance. Respiratory:  Negative for cough, chest tightness and shortness of breath. Cardiovascular:  Negative for chest pain, palpitations and leg swelling. Gastrointestinal:  Negative for abdominal distention, abdominal pain, constipation, diarrhea and nausea. Endocrine: Positive for heat intolerance. Negative for cold intolerance, polydipsia, polyphagia and polyuria. Genitourinary:  Negative for decreased urine volume, difficulty urinating, dysuria, flank pain, frequency and urgency. Musculoskeletal:  Negative for arthralgias, back pain, gait problem, joint swelling, myalgias and neck pain. Skin:  Negative for color change, rash and wound.    Allergic/Immunologic: Negative for food allergies and immunocompromised state. Neurological:  Negative for dizziness, tremors, speech difficulty, weakness, light-headedness, numbness and headaches. Hematological:  Negative for adenopathy. Does not bruise/bleed easily. Psychiatric/Behavioral:  Negative for confusion, decreased concentration, self-injury, sleep disturbance and suicidal ideas. The patient is nervous/anxious. Prior to Visit Medications    Medication Sig Taking?  Authorizing Provider   LORazepam (ATIVAN) 1 MG tablet TAKE 1 TABLET BY MOUTH TWO TO THREE TIMES DAILY AS NEEDED DURING THE DAY AND 2 TABLETS EVERY NIGHT AT BEDTIME FOR A MAXIMUM OF 4 TABLETS Yes GRIS Mercado CNP   venlafaxine (EFFEXOR XR) 75 MG extended release capsule Take 1 capsule by mouth daily Yes GRIS Mercado CNP   tiZANidine (ZANAFLEX) 4 MG tablet TAKE 1 TABLET BY MOUTH EVERY 8 HOURS AS NEEDED FOR MUSCLE CRAMPS OR PAIN Yes Connye Rivet Glischinski, APRN - CNP   atenolol (TENORMIN) 50 MG tablet TAKE 1 TABLET BY MOUTH DAILY Yes GRIS Mercado CNP   dicyclomine (BENTYL) 20 MG tablet TAKE 1 TABLET BY MOUTH EVERY 6 HOURS AS NEEDED Yes GRIS Eric CNP   omeprazole (PRILOSEC) 40 MG delayed release capsule TAKE 1 CAPSULE BY MOUTH TWICE DAILY Yes GRIS Mercado CNP   busPIRone (BUSPAR) 15 MG tablet TAKE 1 TABLET BY MOUTH TWICE DAILY Yes GRIS Mercado CNP   fluticasone (FLONASE) 50 MCG/ACT nasal spray SHAKE WELL AND USE 2 SPRAYS IN EACH NOSTRIL EVERY DAY Yes GRIS Mercado CNP   cloNIDine (CATAPRES) 0.1 MG tablet TAKE 1 TABLET BY MOUTH TWICE DAILY, TAKE EXTRA TABLET DAILY AS NEEDED FOR SBP >170 Yes GRIS Mercado CNP   ezetimibe (ZETIA) 10 MG tablet TAKE 1 TABLET BY MOUTH DAILY Yes GRIS Mercado CNP   hydrOXYzine HCl (ATARAX) 25 MG tablet TAKE 1 TO 2 TABLETS BY MOUTH EVERY 6 HOURS AS NEEDED FOR ITCHING AND ANXIETY AND INSOMNIA Yes GRIS Morales CNP   citalopram (CELEXA) 10 MG tablet Take 1 tablet by mouth daily Yes GRIS Morales CNP   triamterene-hydroCHLOROthiazide (MAXZIDE-25) 37.5-25 MG per tablet Take 1 tablet by mouth daily Yes GRIS Morales CNP   Multiple Vitamin (MULTIVITAMIN ADULT PO) Take 1 tablet by mouth daily Yes Historical Provider, MD   vitamin C (ASCORBIC ACID) 500 MG tablet Take 1,000 mg by mouth daily Yes Historical Provider, MD   Cholecalciferol (VITAMIN D) 50 MCG (2000 UT) CAPS capsule Take 1 capsule by mouth daily Yes Historical Provider, MD Connelly Dooly Oil 1000 MG CAPS Take 1 caplet by mouth daily Yes Historical Provider, MD   Glucosamine-Chondroitin-MSM (GLUCOSAMINE CHONDROIT MSM DS PO) Take 1 tablet by mouth daily Yes Historical Provider, MD   Vit N-Rpkbipu-Adqbrx-Bromelain 744-151-153-50 MG CAPS Take 1 tablet by mouth in the morning and at bedtime Yes Historical Provider, MD   Magnesium 400 MG CAPS Take 1 tablet by mouth in the morning and at bedtime Yes Historical Provider, MD   Potassium Gluconate 595 MG CAPS Take 1 tablet by mouth in the morning and at bedtime Yes Historical Provider, MD   NONFORMULARY Take 1 tablet by mouth in the morning and at bedtime Ankru 78' Yes Historical Provider, MD   NONFORMULARY Take 1 Scoop by mouth in the morning and at bedtime KRATOM Yes Historical Provider, MD   ibuprofen (ADVIL;MOTRIN) 400 MG tablet TAKE 1 TABLET BY MOUTH EVERY 6 HOURS AS NEEDED FOR PAIN Yes Nikki Santos MD   albuterol sulfate  (90 Base) MCG/ACT inhaler INHALE 2 PUFFS INTO THE LUNGS EVERY 6 HOURS AS NEEDED FOR WHEEZING Yes Mechelle Samson MD   melatonin 3 MG TABS tablet Take 6-9 mg by mouth nightly as needed (sleep)  Yes Historical Provider, MD   docusate sodium (COLACE) 100 MG capsule Take 100 mg by mouth 3 times daily as needed for Constipation Yes Historical Provider, MD   Alpha-Lipoic Acid 600 MG TABS Take 1 tablet by mouth in the morning Effort: Pulmonary effort is normal.      Breath sounds: Normal breath sounds. Abdominal:      General: Bowel sounds are normal. There is no abdominal bruit. Palpations: Abdomen is soft. Tenderness: There is no abdominal tenderness. Genitourinary:     Comments: Deferred  Musculoskeletal:         General: Normal range of motion. Cervical back: Full passive range of motion without pain, normal range of motion and neck supple. Right lower leg: No edema. Left lower leg: No edema. Lymphadenopathy:      Head:      Right side of head: No submental, submandibular, tonsillar, preauricular, posterior auricular or occipital adenopathy. Left side of head: No submental, submandibular, tonsillar, preauricular, posterior auricular or occipital adenopathy. Cervical: No cervical adenopathy. Right cervical: No superficial, deep or posterior cervical adenopathy. Left cervical: No superficial, deep or posterior cervical adenopathy. Upper Body:      Right upper body: No supraclavicular adenopathy. Left upper body: No supraclavicular adenopathy. Skin:     General: Skin is warm and moist.      Capillary Refill: Capillary refill takes less than 2 seconds. Comments: Flushed cheeks, sweaty   Neurological:      General: No focal deficit present. Mental Status: She is alert and oriented to person, place, and time. Mental status is at baseline. Sensory: Sensation is intact. Motor: Motor function is intact. Coordination: Coordination is intact. Gait: Gait is intact. Psychiatric:         Attention and Perception: Attention and perception normal.         Mood and Affect: Mood and affect normal.         Speech: Speech normal.         Behavior: Behavior normal. Behavior is cooperative. Thought Content:  Thought content normal.         Cognition and Memory: Cognition and memory normal.         Judgment: Judgment normal.       ASSESSMENT/PLAN:  1. KEVIN (generalized anxiety disorder)  -     LORazepam (ATIVAN) 1 MG tablet; TAKE 1 TABLET BY MOUTH TWO TO THREE TIMES DAILY AS NEEDED DURING THE DAY AND 2 TABLETS EVERY NIGHT AT BEDTIME FOR A MAXIMUM OF 4 TABLETS, Disp-120 tablet, R-0Normal   Low concern for misuse or abuse   Continue to follow with psych   Continue current dosing of ativan and effexor and buspar   Continue with self care  2. High risk medication use  -     POCT Rapid Drug Screen   Has medical marijuana, card on file   OARRS reviewed  3. Medication management contract signed   Signed today for 2022  4. Hot flashes    Discussed paxil, tried in past for depression but not for hot flashes, will discuss with psych at her next visit and with her oncologist   Had to stop estrogen due to breast cancer   Hot flashes are constant, says she even sweats in the shower   Sweating at visit today   Next mammo tomorrow for treatment follow up    Niki 24  Call insurance company to discuss coverage for shingles vaccine (Shingrix) 2 dose series   TDAP vaccine recommended- call insurance to discuss coverage  Colon cancer screening discussed- colonoscopy 2018- due for repeat  Mammo- undergoing cancer treatment     I have reviewed patient's pertinent medical history, relevant laboratory and imaging studies, and past/future health maintenance. Discussed with the patient the importance of adhering to their current medication regimen as directed. Advised the patient that they should continue to work on eating a healthy balanced diet and staying active by exercising within their personal limits. Orders as listed above. Patient was advised to keep future appointments with their respective specialty care team(s). Patient had the opportunity to ask questions, all of which were answered to the best of my ability and with patient satisfaction. Patient understands and is agreeable with the care plan following today's visit.  Patient is to schedule an appointment for any new or worsening symptoms. Go to ER for significant shortness of breath, chest pain, or uncontrolled pain or fever. I discussed with patient the risk and benefits of any medications that were prescribed today. I verified that the patient understands their medications, labs, and/or procedures. The patient is doing well with current medication regimen and does not have any barriers to adherence. The patient's self-management abilities are good. Return in about 3 months (around 3/6/2023) for Annual Wellness Visit, Fasting Labs, Anxiety Follow Up. An electronic signature was used to authenticate this note.     --Virgil Luna, GRIS - CNP on 12/6/2022 at 10:28 AM

## 2022-12-06 NOTE — LETTER
CONTROLLED SUBSTANCE MEDICATION AGREEMENT     Patient Name: Dolores Dodd  Patient YOB: 1955   I understand, that controlled substance medications may be used to help better manage my symptoms and to improve my ability to function at home, work and in social settings. However, I also understand that these medications do have risks, which have been discussed with me, including possible development of physical or psychological dependence. I understand that successful treatment requires mutual trust and honesty between me and my provider. I understand and agree that following this Medication Agreement is necessary in continuing my provider-patient relationship and the success of my treatment plan. Explanation from my Provider: Benefits and Goals of Controlled Substance Medications: There are two potential goals for your treatment: (1) decreased pain and suffering (2) improved daily life functions. There are many possible treatments for your chronic condition(s). Alternatives such as physical therapy, yoga, massage, home daily exercise, meditation, relaxation techniques, injections, chiropractic manipulations, surgery, cognitive therapy, hypnosis and many medications that are not habit-forming may be used. Use of controlled substance medications may be helpful, but they are unlikely to resolve all symptoms or restore all function. Explanation from my Provider: Risks of Controlled Substance Medications:  Opioid pain medications: These medications can lead to problems such as addiction/dependence, sedation, lightheadedness/dizziness, memory issues, falls, constipation, nausea, or vomiting. They may also impair the ability to drive or operate machinery. Additionally, these medications may lower testosterone levels, leading to loss of bone strength, stamina and sex drive.   They may cause problems with breathing, sleep apnea and reduced coughing, which is especially dangerous for patients with lung disease. Overdose or dangerous interactions with alcohol and other medications may occur, leading to death. Hyperalgesia may develop, which means patients receiving opioids for the treatment of pain may become more sensitive to certain painful stimuli, and in some cases, experience pain from ordinarily non-painful stimuli. Women between the ages of 14-53 who could become pregnant should carefully weigh the risks and benefits of opioids with their physicians, as these medications increase the risk of pregnancy complications, including miscarriage,  delivery and stillbirth. It is also possible for babies to be born addicted to opioids. Opioid dependence withdrawal symptoms may include; feelings of uneasiness, increased pain, irritability, belly pain, diarrhea, sweats and goose-flesh. Benzodiazepines and non-benzodiazepine sleep medications: These medications can lead to problems such as addiction/dependence, sedation, fatigue, lightheadedness, dizziness, incoordination, falls, depression, hallucinations, and impaired judgment, memory and concentration. The ability to drive and operate machinery may also be affected. Abnormal sleep-related behaviors have been reported, including sleepwalking, driving, making telephone calls, eating, or having sex while not fully awake. These medications can suppress breathing and worsen sleep apnea, particularly when combined with alcohol or other sedating medications, potentially leading to death. Dependence withdrawal symptoms may include tremors, anxiety, hallucinations and seizures. Stimulants:  Common adverse effects include addiction/dependence, increased blood  pressure and heart rate, decreased appetite, nausea, involuntary weight loss, insomnia,                                                                                                                     Initials:_______   irritability, and headaches.   These risks may increase when these medications are combined with other stimulants, such as caffeine pills or energy drinks, certain weight loss supplements and oral decongestants. Dependence withdrawal symptoms may include depressed mood, loss of interest, suicidal thoughts, anxiety, fatigue, appetite changes and agitation. Testosterone replacement therapy:  Potential side effects include increased risk of stroke and heart attack, blood clots, increased blood pressure, increased cholesterol, enlarged prostate, sleep apnea, irritability/aggression and other mood disorders, and decreased fertility. I agree and understand that I and my prescriber have the following rights and responsibilities regarding my treatment plan:     1. MY RIGHTS:  To be informed of my treatment and medication plan. To be an active participant in my health and wellbeing. 2. MY RESPONSIBILITY AND UNDERSTANDING FOR USE OF MEDICATIONS   I will take medications at the dose and frequency as directed. For my safety, I will not increase or change how I take my medications without the recommendation of my healthcare provider.  I will actively participate in any program recommended by my provider which may improve function, including social, physical, psychological programs.  I will not take my medications with alcohol or other drugs not prescribed to me. I understand that drinking alcohol with my medications increases the chances of side effects, including reduced breathing rate and could lead to personal injury when operating machinery.  I understand that if I have a history of substance use disorders, including alcohol or other illicit drugs, that I may be at increased risk of addiction to my medications.  I agree to notify my provider immediately if I should become pregnant so that my treatment plan can be adjusted.    I agree and understand that I shall only receive controlled substance medications from the prescriber that signed this agreement unless there is written agreement among other prescribers of controlled substances outlining the responsibility of the medications being prescribed.  I understand that the if the controlled medication is not helping to achieve goals, the dosage may be tapered and no longer prescribed. 3. MY RESPONSIBILITY FOR COMMUNICATION / PRESCRIPTION RENEWALS   I agree that all controlled substance medications that I take will be prescribed only by my provider. If another healthcare provider prescribes me medication in an emergency, I will notify my provider within seventy-two (72) hours.  I will arrange for refills at the prescribed interval ONLY during regular office hours. I will not ask for refills earlier than agreed, after-hours, on holidays or weekends. Refills may take up to 72 hours for processing and prescriptions to reach the pharmacy.  I will inform my other health care providers that I am taking these medications and of the existence of this [id number]. In the event of an emergency, I will provide the same information to the emergency department prescribers.  I will keep my provider updated on the pharmacy I am using for controlled medication prescription filling. Initials:_______  4. MY RESPONSIBILITY FOR PROTECTING MEDICATIONS   I will protect my prescriptions and medications. I understand that lost or misplaced prescriptions will not be replaced.  I will keep medications only for my own use and will not share them with others. I will keep all medications away from children.  I agree that if my medications are adjusted or discontinued, I will properly dispose of any remaining medications. I understand that I will be required to dispose of any remaining controlled medications as, directed by my prescriber, prior to being provided with any prescriptions for other controlled medications.   Medication drop box locations can be found at: [email or web address]    5. MY RESPONSIBILITY WITH ILLEGAL DRUGS    I will not use illegal or street drugs or another person's prescription medications not prescribed to me.  If there are identified addiction type symptoms, then referral to a program may be provided by my provider and I agree to follow through with this recommendation. 6. MY RESPONSIBILITY FOR COOPERATION WITH INVESTIGATIONS   I understand that my provider will comply with any applicable law and may discuss my use and/or possible misuse/abuse of controlled substances and alcohol, as appropriate, with any health care provider involved in my care, pharmacist, or legal authority.  I authorize my provider and pharmacy to cooperate fully with law enforcement agencies (as permitted by law) in the investigation of any possible misuse, sale, or other diversion of my controlled substances.  I agree to waive any applicable privilege or right of privacy or confidentiality with respect to these authorizations. 7. PROVIDERS RIGHT TO MONITOR FOR SAFETY: PRESCRIPTION MONITORING / DRUG TESTING   I consent to drug/toxicology screening and will submit to a drug screen upon my providers request to assure I am only taking the prescribed drugs for my safety monitoring. I understand that a drug screen is a laboratory test in which a sample of my urine, blood or saliva is checked to see what drugs I have been taking. This may entail an observed urine specimen, which means that a nurse or other health care provider may watch me provide urine, and I will cooperate if I am asked to provide an observed specimen.  I understand that my provider will check a copy of my State Prescription Monitoring Program () Report in order to safely prescribe medications.  Pill Counts: I consent to pill counts when requested.   I may be asked to bring all my prescribed controlled substance medications, in their original bottles, to all of my scheduled appointments. In addition, my provider may ask me to come to the practice at any time for a random pill count. 8. TERMINATION OF THIS AGREEMENT  For my safety, my prescriber has the right to stop prescribing controlled substance medications and may end this agreement. Initials:_______   Conditions that may result in termination of this agreement:  a. I do not show any improvement in pain, or my activity has not improved. b. I develop rapid tolerance or loss of improvement, as described in my treatment plan.  c. I develop significant side effects from the medication. d. My behavior is not consistent with the responsibilities outlined above, thereby causing safety concerns to continue prescribing controlled substance medications. e. I fail to follow the terms of this agreement. f. Other:____________________________       UNDERSTANDING THIS MEDICATION AGREEMENT:    I have read the above and have had all my questions answered. For chronic disease management, I know that my symptoms can be managed with many types of treatments. A chronic medication trial may be part of my treatment, but I must be an active participant in my care. Medication therapy is only one part of my symptom management plan. In some cases, there may be limited scientific evidence to support the chronic use of certain medications to improve symptoms and daily function. Furthermore, in certain circumstances, there may be scientific information that suggests that the use of chronic controlled substances may worsen my symptoms and increase my risk of unintentional death directly related to this medication therapy. I know that if my provider feels my risk from controlled medications is greater than my benefit, I will have my controlled substance medication(s) compassionately lowered or removed altogether.      I further agree to allow this office to contact my HIPAA contact if there are concerns about my safety and use of the controlled medications. I have agreed to use the prescribed controlled substance medications to me as instructed by my provider and as stated in this Medication Agreement. My initial on each page and my signature below shows that I have read each page and I have had the opportunity to ask questions with answers provided by my provider.     Patient Name (Printed): _____________________________________  Patient Signature:  ______________________   Date: _____________    Prescriber Name (Printed): ___________________________________  Prescriber Signature: _____________________  Date: _____________

## 2022-12-06 NOTE — TELEPHONE ENCOUNTER
From: Sear Weaver  To: Dominguez Azar  Sent: 12/6/2022 5:08 PM EST  Subject: MDMA? I don't take ecstasy! Why would it show up in my urine? None of the supplements I take have a \"happy\" effect to them.  Thanks, [employer]

## 2022-12-07 ENCOUNTER — HOSPITAL ENCOUNTER (OUTPATIENT)
Dept: WOMENS IMAGING | Age: 67
Discharge: HOME | End: 2022-12-07
Payer: MEDICARE

## 2022-12-07 ENCOUNTER — OFFICE VISIT (OUTPATIENT)
Dept: BREAST CENTER | Age: 67
End: 2022-12-07

## 2022-12-07 VITALS — DIASTOLIC BLOOD PRESSURE: 57 MMHG | SYSTOLIC BLOOD PRESSURE: 121 MMHG | HEART RATE: 68 BPM

## 2022-12-07 DIAGNOSIS — Z90.13 S/P PARTIAL MASTECTOMY, BILATERAL: ICD-10-CM

## 2022-12-07 DIAGNOSIS — D05.12 NEOPLASM OF LEFT BREAST, PRIMARY TUMOR STAGING CATEGORY TIS: DUCTAL CARCINOMA IN SITU (DCIS): ICD-10-CM

## 2022-12-07 DIAGNOSIS — C50.911 DUCTAL CARCINOMA OF RIGHT BREAST (HCC): Primary | ICD-10-CM

## 2022-12-07 DIAGNOSIS — C50.911 DUCTAL CARCINOMA OF RIGHT BREAST (HCC): ICD-10-CM

## 2022-12-07 PROCEDURE — 77066 DX MAMMO INCL CAD BI: CPT

## 2022-12-07 NOTE — PATIENT INSTRUCTIONS
Mammogram results were disussed   Breast exam was unremarkable for palpable masses  Continue with monthly self breast exams  Return to office in one year with bilateral diagnostic mammogram    Healthy Lifestyle Recommendations: healthy diet (decrease consumption of red meat, increase fresh fruits and vegetables), decreased alcohol consumption (less than 4 drinks/week), adequate sleep (goal 6-8 hours), routine exercise (goal 150 minutes/week or greater), weight control.

## 2022-12-07 NOTE — PROGRESS NOTES
12/7/2022    Chief Complaint   Patient presents with    6 Month Follow-Up     Patient presents for 6 month breast check with mammogram        HPI Patient is s/p right lumpectomy/snbx 6/2022 for T1aN0 ductal carcinoma, ER pos, KS neg, Her2 neg, final path showed no residual invasive carcinoma. 12 mm area of LCIS noted. 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 Postop bilateral accelerated radiation, finished 8/2022. She opted not to take an AI. Patient has not felt any breast masses, no breast pain or nipple discharge.      Bilateral diagnostic mammogram today BIRADS 2      Current Outpatient Medications:     [START ON 12/15/2022] LORazepam (ATIVAN) 1 MG tablet, TAKE 1 TABLET BY MOUTH TWO TO THREE TIMES DAILY AS NEEDED DURING THE DAY AND 2 TABLETS EVERY NIGHT AT BEDTIME FOR A MAXIMUM OF 4 TABLETS, Disp: 120 tablet, Rfl: 0    venlafaxine (EFFEXOR XR) 75 MG extended release capsule, Take 1 capsule by mouth daily, Disp: 90 capsule, Rfl: 3    tiZANidine (ZANAFLEX) 4 MG tablet, TAKE 1 TABLET BY MOUTH EVERY 8 HOURS AS NEEDED FOR MUSCLE CRAMPS OR PAIN, Disp: 270 tablet, Rfl: 0    atenolol (TENORMIN) 50 MG tablet, TAKE 1 TABLET BY MOUTH DAILY, Disp: 90 tablet, Rfl: 3    dicyclomine (BENTYL) 20 MG tablet, TAKE 1 TABLET BY MOUTH EVERY 6 HOURS AS NEEDED, Disp: 360 tablet, Rfl: 0    omeprazole (PRILOSEC) 40 MG delayed release capsule, TAKE 1 CAPSULE BY MOUTH TWICE DAILY, Disp: 180 capsule, Rfl: 3    busPIRone (BUSPAR) 15 MG tablet, TAKE 1 TABLET BY MOUTH TWICE DAILY, Disp: 180 tablet, Rfl: 0    fluticasone (FLONASE) 50 MCG/ACT nasal spray, SHAKE WELL AND USE 2 SPRAYS IN EACH NOSTRIL EVERY DAY, Disp: 48 g, Rfl: 5    cloNIDine (CATAPRES) 0.1 MG tablet, TAKE 1 TABLET BY MOUTH TWICE DAILY, TAKE EXTRA TABLET DAILY AS NEEDED FOR SBP >170, Disp: 270 tablet, Rfl: 2    ezetimibe (ZETIA) 10 MG tablet, TAKE 1 TABLET BY MOUTH DAILY, Disp: 90 tablet, Rfl: 0    hydrOXYzine HCl (ATARAX) 25 MG tablet, TAKE 1 TO 2 TABLETS BY MOUTH EVERY 6 HOURS AS NEEDED FOR ITCHING AND ANXIETY AND INSOMNIA, Disp: 120 tablet, Rfl: 0    citalopram (CELEXA) 10 MG tablet, Take 1 tablet by mouth daily, Disp: 90 tablet, Rfl: 3    triamterene-hydroCHLOROthiazide (MAXZIDE-25) 37.5-25 MG per tablet, Take 1 tablet by mouth daily, Disp: 90 tablet, Rfl: 3    Multiple Vitamin (MULTIVITAMIN ADULT PO), Take 1 tablet by mouth daily, Disp: , Rfl:     vitamin C (ASCORBIC ACID) 500 MG tablet, Take 1,000 mg by mouth daily, Disp: , Rfl:     Cholecalciferol (VITAMIN D) 50 MCG (2000 UT) CAPS capsule, Take 1 capsule by mouth daily, Disp: , Rfl:     Krill Oil 1000 MG CAPS, Take 1 caplet by mouth daily, Disp: , Rfl:     Glucosamine-Chondroitin-MSM (GLUCOSAMINE CHONDROIT MSM DS PO), Take 1 tablet by mouth daily, Disp: , Rfl:     Vit N-Llafezs-Efnslg-Bromelain 562-553-012-50 MG CAPS, Take 1 tablet by mouth in the morning and at bedtime, Disp: , Rfl:     Magnesium 400 MG CAPS, Take 1 tablet by mouth in the morning and at bedtime, Disp: , Rfl:     Potassium Gluconate 595 MG CAPS, Take 1 tablet by mouth in the morning and at bedtime, Disp: , Rfl:     NONFORMULARY, Take 1 tablet by mouth in the morning and at bedtime LACTUCA VIROSA-\"WILD LETTUCE', Disp: , Rfl:     NONFORMULARY, Take 1 Scoop by mouth in the morning and at bedtime KRATOM, Disp: , Rfl:     Alpha-Lipoic Acid 600 MG TABS, Take 1 tablet by mouth in the morning and at bedtime, Disp: , Rfl:     ibuprofen (ADVIL;MOTRIN) 400 MG tablet, TAKE 1 TABLET BY MOUTH EVERY 6 HOURS AS NEEDED FOR PAIN, Disp: 360 tablet, Rfl: 3    albuterol sulfate  (90 Base) MCG/ACT inhaler, INHALE 2 PUFFS INTO THE LUNGS EVERY 6 HOURS AS NEEDED FOR WHEEZING, Disp: 54 g, Rfl: 3    melatonin 3 MG TABS tablet, Take 6-9 mg by mouth nightly as needed (sleep) , Disp: , Rfl:     docusate sodium (COLACE) 100 MG capsule, Take 100 mg by mouth 3 times daily as needed for Constipation, Disp: , Rfl:       Past Medical History: Diagnosis Date    Anxiety state, unspecified     Arthritis     Chronic back pain     Tried spinal injections    Depressive disorder, not elsewhere classified     Duodenal ulcer, unspecified as acute or chronic, without hemorrhage, perforation, or obstruction     Essential hypertension 2/22/2016    Fibromyalgia     GERD (gastroesophageal reflux disease)     Hyperlipidemia     IBS (irritable bowel syndrome)     Seasonal allergies     Sleep disorder        Past Surgical History:   Procedure Laterality Date    ABDOMINAL ADHESION SURGERY      BREAST BIOPSY Bilateral 6/16/2022    RADIOFREQUENCY IDENTIFICATION LOCALIZED LEFT BREAST LUMPECTOMY, RADIOFREQUENCY IDENTIFIED LOCALIZED RIGHT BREAST LUMPECTOMY, RIGHT AXILLARY SENTINEL NODE BIOPSY WITH RADIOISOTOPE, RFID localized right breast excisional biopsy performed by Funmilayo Hampton MD at [address] TEST  02/2016    negative    CHOLECYSTECTOMY      COLONOSCOPY  9/13    negative    DENTAL SURGERY      HIATAL HERNIA REPAIR  09/07/2018    LAPAROSCOPIC HIATAL HERNIA, LAPAROSCOPIC NISSEN FUNDOPLICATION WITH MESH    HYSTERECTOMY (CERVIX STATUS UNKNOWN)      LAPAROSCOPIC NISSEN FUNDOPLICATION      ROSELIA BREAST LOC ADDITIONAL LESION LEFT Left 6/10/2022    ROSELIA BREAST LOC ADDITIONAL LESION LEFT 6/10/2022 [facility] [address]    ROSELIA BREAST LOC ADDITIONAL LESION RIGHT Right 6/10/2022    ROSELIA BREAST LOC ADDITIONAL LESION RIGHT 6/10/2022 [facility] [address]    ROSELIA STEROTACTIC LOC BREAST BIOPSY LEFT Left 5/4/2022    ROSELIA STEROTACTIC LOC BREAST BIOPSY LEFT 5/4/2022 [facility]    ROSELIA STEROTACTIC LOC BREAST BIOPSY RIGHT Right 6/1/2022    ROSELIA STEROTACTIC LOC BREAST BIOPSY RIGHT 6/1/2022 [facility] NJX DX/THER SBST INTRLMNR LMBR/SAC W/IMG GDN N/A 11/20/2018    LUMBAR L5-S1 EPIDURAL STEROID INJECTION WITH FLUOROSCOPY performed by Lilo La MD at [address] [facility]  9/13    small paraesoph hernia     US BREAST BIOPSY NEEDLE ADDITIONAL LEFT Left 5/24/2022    US BREAST BIOPSY NEEDLE ADDITIONAL LEFT 5/24/2022 [facility]TR[facility]    US BREAST BIOPSY NEEDLE ADDITIONAL RIGHT Right 5/24/2022    US BREAST BIOPSY NEEDLE ADDITIONAL RIGHT 5/24/2022 [facility] ULTRASOUND    US BREAST NEEDLE BIOPSY LEFT Left 5/4/2022    US BREAST NEEDLE BIOPSY LEFT 5/4/2022 [facility]TRASOUND    US BREAST NEEDLE BIOPSY LEFT Left 5/24/2022    US BREAST NEEDLE BIOPSY LEFT 5/24/2022 [facility]TR[facility]OUND    US BREAST NEEDLE BIOPSY RIGHT Right 5/24/2022    US BREAST NEEDLE BIOPSY RIGHT 5/24/2022 [facility]OUND       Social History     Tobacco Use    Smoking status: Every Day     Packs/day: 1.00     Years: 40.00     Pack years: 40.00     Types: Cigarettes    Smokeless tobacco: Never    Tobacco comments:     2/3   Vaping Use    Vaping Use: Some days    Substances: Nicotine, Flavoring    Devices: Disposable   Substance Use Topics    Alcohol use: Yes     Alcohol/week: 1.0 standard drink     Types: 1 Glasses of wine per week     Comment: occasionaly    Drug use: Never         Physical Exam    Bilateral breasts - Well healed lumpectomy scars, no masses, no nipple or skin changes. No cervical or axillary adenopathy. ASSESSMENT   Diagnosis Orders   1. Ductal carcinoma of right breast ([address]        2. 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)        3. S/P partial mastectomy, bilateral             PLAN    Mammogram was reviewed. No masses on exam. At the present time, there is no concern for breast cancer recurrence. Healthy lifestyle modifications were reviewed with the patient.   Continue monthly self breast exam, f/u with me in 1 year with bilateral diagnostic mammogram.     I have spent 20 minutes reviewing previous notes, test results, and face to face with the patient discussing the diagnosis and importance of compliance with the treatment plan as well as documenting on the day of the visit 12/7/2022   An  electronic signature was used to authenticate this note.     --Cesar Alvarado MD on 12/7/2022 at 1:16 PM

## 2022-12-21 ENCOUNTER — PATIENT MESSAGE (OUTPATIENT)
Dept: FAMILY MEDICINE CLINIC | Age: 67
End: 2022-12-21

## 2022-12-21 NOTE — TELEPHONE ENCOUNTER
From: Sera Weaver  To: Wan Harman  Sent: 12/21/2022 12:02 PM EST  Subject: Pain management    Hi, I just got an email from the pain [employer] that tells me on Jan 1, new Medicare codes will take effect so that primary care Drs can see patients and manage their chronic pain! Wanted to let you know. BROOKE Alvarez

## 2023-01-08 DIAGNOSIS — J30.2 SEASONAL ALLERGIES: ICD-10-CM

## 2023-01-08 DIAGNOSIS — R10.9 ABDOMINAL CRAMPING: ICD-10-CM

## 2023-01-09 RX ORDER — DICYCLOMINE HCL 20 MG
TABLET ORAL
Qty: 360 TABLET | Refills: 0 | Status: SHIPPED | OUTPATIENT
Start: 2023-01-09

## 2023-01-09 RX ORDER — HYDROXYZINE HYDROCHLORIDE 25 MG/1
TABLET, FILM COATED ORAL
Qty: 120 TABLET | Refills: 0 | Status: SHIPPED | OUTPATIENT
Start: 2023-01-09

## 2023-01-16 DIAGNOSIS — F41.1 GAD (GENERALIZED ANXIETY DISORDER): ICD-10-CM

## 2023-01-16 RX ORDER — LORAZEPAM 1 MG/1
TABLET ORAL
Qty: 120 TABLET | Refills: 0 | Status: SHIPPED | OUTPATIENT
Start: 2023-01-16 | End: 2023-02-21

## 2023-01-26 ENCOUNTER — E-VISIT (OUTPATIENT)
Dept: FAMILY MEDICINE CLINIC | Age: 68
End: 2023-01-26

## 2023-01-26 ENCOUNTER — PATIENT MESSAGE (OUTPATIENT)
Dept: FAMILY MEDICINE CLINIC | Age: 68
End: 2023-01-26

## 2023-01-26 DIAGNOSIS — J01.40 ACUTE NON-RECURRENT PANSINUSITIS: Primary | ICD-10-CM

## 2023-01-26 ASSESSMENT — LIFESTYLE VARIABLES
SMOKING_YEARS: 20
SMOKING_STATUS: YES

## 2023-01-26 NOTE — TELEPHONE ENCOUNTER
From: Sera Weaver  To: Kassi Herminia  Sent: 1/26/2023 2:59 PM EST  Subject: Update    Hi, I wanted to let you know. .. I've started seeing a mental health provider. Her name is Kaleb Osorio. She is with a group called [facility] [employer]. They are on [facility] in [place]. They don't have a . Her cell# [phone]. I have ADHD. Have had it since I was a kid. Many of the indications and symptoms are not what I thought, but me all over! I am on Concerta LA at the moment. Also, wanted to ask if you will order an antibiotic for my sinuses. I get sinus infection generally 1-2 times a year for a long time. No fever, no chest congestion, just nasty yellow tan drainage and great big headaches. Last time, one of the other docs ordered for me. I will come in if you need me to.  Thanks, Kim

## 2023-01-27 RX ORDER — AMOXICILLIN AND CLAVULANATE POTASSIUM 875; 125 MG/1; MG/1
1 TABLET, FILM COATED ORAL 2 TIMES DAILY
Qty: 14 TABLET | Refills: 0 | Status: SHIPPED | OUTPATIENT
Start: 2023-01-27 | End: 2023-02-03

## 2023-01-27 NOTE — PROGRESS NOTES
Patient completed e visit for sinus congestion. Nasal congestion, mostly thick yellow or green, pain and pressure around nose and face, sore throat, sneezing and coughing, coughing early in morning, coughing up some mucus, thin yellow green mucus, ears popping after blowing nose especially, eyes burning and tearing especially in the morning. Symptoms present x 5 days. No fevers. Patient is a smoker, 1/2 PPD. Patient has breast cancer. Constant headache. Needed antibiotic in past. Has tried OTC mucinex D. Last abx 2 months ago. Reviewed allergies. 1. Acute non-recurrent pansinusitis  -     amoxicillin-clavulanate (AUGMENTIN) 875-125 MG per tablet; Take 1 tablet by mouth 2 times daily for 7 days, Disp-14 tablet, R-0Normal  Mucinex twice daily   Hot showers   Vicks vapo rub to chest, under nares   Humidifier or vaporizer near bed where sleeping   Sinus rinse as needed/Netti pot   Push fluids, increase protein intake   Cough and deep breathe   OTC (over the counter) Cough suppressant at night to aid with sleep- robitussin, delsym   If breathing issues, cough and deep breathe, lay on belly (prone) to aid with oxygenating lungs if possibly COVID   Vitamin C, zinc, and vitamin D supplements to boost immune system   Cepacol/throat lozenges for sore throat and cough   Warm tea/tea with honey for cough and sore throat   Warm water or salt water gargle for sore throat   If given antibiotic, take in its entirety until completion to decrease antibiotic resistance developing   Tylenol and ibuprofen for aches and pains and fever >100 F   Take medication in its entirety even if feeling better to avoid a resistance to antibiotics   Recommend a probiotic while on antibiotic, do not take at same time of day as antibiotic as it can decrease efficacy of antibiotic. Probiotics can help to decrease GI side effects of antibiotics.  You can also eat a yogurt while on antibiotics to help decrease GI side effects and help keep the healthy gut bacteria present while on antibiotics. Probiotics can be purchased Over the counter in various forms- pills, powder, gummies, etc.    Sent [employer] message to patient with plan of care    11-20 minutes were spent on the digital evaluation and management of this patient.     Electronically signed by GRIS Boss CNP on 1/27/2023 at 7:47 AM

## 2023-02-20 DIAGNOSIS — M47.816 FACET ARTHROPATHY, LUMBAR: ICD-10-CM

## 2023-02-20 DIAGNOSIS — F41.8 DEPRESSION WITH ANXIETY: ICD-10-CM

## 2023-02-20 DIAGNOSIS — F41.1 GAD (GENERALIZED ANXIETY DISORDER): ICD-10-CM

## 2023-02-20 NOTE — TELEPHONE ENCOUNTER
LAST VISIT 12/06/2022 WITH NIKHIL, NEXT VISIT 03/06/2023 WITH NIKHIL.   [address] [place] [address]

## 2023-02-21 RX ORDER — LORAZEPAM 1 MG/1
TABLET ORAL
Qty: 120 TABLET | Refills: 0 | Status: SHIPPED | OUTPATIENT
Start: 2023-02-21 | End: 2023-03-23

## 2023-02-21 RX ORDER — TIZANIDINE 4 MG/1
TABLET ORAL
Qty: 270 TABLET | Refills: 3 | Status: SHIPPED | OUTPATIENT
Start: 2023-02-21

## 2023-02-22 DIAGNOSIS — J30.2 SEASONAL ALLERGIES: ICD-10-CM

## 2023-02-22 RX ORDER — HYDROXYZINE HYDROCHLORIDE 25 MG/1
TABLET, FILM COATED ORAL
Qty: 120 TABLET | Refills: 0 | Status: SHIPPED | OUTPATIENT
Start: 2023-02-22

## 2023-03-21 DIAGNOSIS — E78.2 MIXED HYPERLIPIDEMIA: ICD-10-CM

## 2023-03-21 RX ORDER — IBUPROFEN 400 MG/1
TABLET ORAL
Qty: 360 TABLET | Refills: 3 | Status: SHIPPED | OUTPATIENT
Start: 2023-03-21

## 2023-03-21 RX ORDER — EZETIMIBE 10 MG/1
10 TABLET ORAL DAILY
Qty: 90 TABLET | Refills: 0 | Status: SHIPPED | OUTPATIENT
Start: 2023-03-21

## 2023-03-21 NOTE — TELEPHONE ENCOUNTER
Future Appointments    Encounter Information    Provider Department Appt Notes   3/28/2023 Con Brian, [address] for Ativan, blood work.    12/13/2023 [facility]  [address] 1 yr dx bi lat mm   12/13/2023 Janett Castellanos MD [facility] Breast Surgery 1 yr f/u     Past Visits    Date Provider Specialty Visit Type Primary Dx   12/07/2022 [facility]anos [facility] Ductal carcinoma of right breast [facility])   12/06/2022 Con Brian APRN - CNP Family Medicine Office Visit KEVIN (generalized anxiety disorder)

## 2023-03-28 ENCOUNTER — TELEMEDICINE (OUTPATIENT)
Dept: FAMILY MEDICINE CLINIC | Age: 68
End: 2023-03-28
Payer: MEDICARE

## 2023-03-28 DIAGNOSIS — F41.1 GAD (GENERALIZED ANXIETY DISORDER): ICD-10-CM

## 2023-03-28 DIAGNOSIS — Z13.6 SCREENING FOR CARDIOVASCULAR CONDITION: ICD-10-CM

## 2023-03-28 DIAGNOSIS — Z71.89 ACP (ADVANCE CARE PLANNING): ICD-10-CM

## 2023-03-28 DIAGNOSIS — Z87.891 PERSONAL HISTORY OF TOBACCO USE, PRESENTING HAZARDS TO HEALTH: ICD-10-CM

## 2023-03-28 DIAGNOSIS — Z00.00 MEDICARE ANNUAL WELLNESS VISIT, SUBSEQUENT: Primary | ICD-10-CM

## 2023-03-28 PROCEDURE — 1123F ACP DISCUSS/DSCN MKR DOCD: CPT | Performed by: NURSE PRACTITIONER

## 2023-03-28 PROCEDURE — 3017F COLORECTAL CA SCREEN DOC REV: CPT | Performed by: NURSE PRACTITIONER

## 2023-03-28 PROCEDURE — 99406 BEHAV CHNG SMOKING 3-10 MIN: CPT | Performed by: NURSE PRACTITIONER

## 2023-03-28 PROCEDURE — 99497 ADVNCD CARE PLAN 30 MIN: CPT | Performed by: NURSE PRACTITIONER

## 2023-03-28 PROCEDURE — G8484 FLU IMMUNIZE NO ADMIN: HCPCS | Performed by: NURSE PRACTITIONER

## 2023-03-28 PROCEDURE — G0439 PPPS, SUBSEQ VISIT: HCPCS | Performed by: NURSE PRACTITIONER

## 2023-03-28 PROCEDURE — G0446 INTENS BEHAVE THER CARDIO DX: HCPCS | Performed by: NURSE PRACTITIONER

## 2023-03-28 RX ORDER — LORAZEPAM 1 MG/1
TABLET ORAL
Qty: 120 TABLET | Refills: 0 | Status: SHIPPED | OUTPATIENT
Start: 2023-03-28 | End: 2023-04-27

## 2023-03-28 RX ORDER — METHYLPHENIDATE HYDROCHLORIDE 5 MG/1
5 TABLET ORAL 2 TIMES DAILY
COMMUNITY
Start: 2023-03-13

## 2023-03-28 SDOH — ECONOMIC STABILITY: FOOD INSECURITY: WITHIN THE PAST 12 MONTHS, THE FOOD YOU BOUGHT JUST DIDN'T LAST AND YOU DIDN'T HAVE MONEY TO GET MORE.: NEVER TRUE

## 2023-03-28 SDOH — ECONOMIC STABILITY: HOUSING INSECURITY
IN THE LAST 12 MONTHS, WAS THERE A TIME WHEN YOU DID NOT HAVE A STEADY PLACE TO SLEEP OR SLEPT IN A SHELTER (INCLUDING NOW)?: NO

## 2023-03-28 SDOH — ECONOMIC STABILITY: FOOD INSECURITY: WITHIN THE PAST 12 MONTHS, YOU WORRIED THAT YOUR FOOD WOULD RUN OUT BEFORE YOU GOT MONEY TO BUY MORE.: NEVER TRUE

## 2023-03-28 SDOH — ECONOMIC STABILITY: INCOME INSECURITY: HOW HARD IS IT FOR YOU TO PAY FOR THE VERY BASICS LIKE FOOD, HOUSING, MEDICAL CARE, AND HEATING?: NOT HARD AT ALL

## 2023-03-28 ASSESSMENT — PATIENT HEALTH QUESTIONNAIRE - PHQ9
1. LITTLE INTEREST OR PLEASURE IN DOING THINGS: 0
SUM OF ALL RESPONSES TO PHQ9 QUESTIONS 1 & 2: 1
SUM OF ALL RESPONSES TO PHQ QUESTIONS 1-9: 4
7. TROUBLE CONCENTRATING ON THINGS, SUCH AS READING THE NEWSPAPER OR WATCHING TELEVISION: 1
SUM OF ALL RESPONSES TO PHQ QUESTIONS 1-9: 4
SUM OF ALL RESPONSES TO PHQ QUESTIONS 1-9: 4
10. IF YOU CHECKED OFF ANY PROBLEMS, HOW DIFFICULT HAVE THESE PROBLEMS MADE IT FOR YOU TO DO YOUR WORK, TAKE CARE OF THINGS AT HOME, OR GET ALONG WITH OTHER PEOPLE: 1
4. FEELING TIRED OR HAVING LITTLE ENERGY: 1
2. FEELING DOWN, DEPRESSED OR HOPELESS: 1
3. TROUBLE FALLING OR STAYING ASLEEP: 1
8. MOVING OR SPEAKING SO SLOWLY THAT OTHER PEOPLE COULD HAVE NOTICED. OR THE OPPOSITE, BEING SO FIGETY OR RESTLESS THAT YOU HAVE BEEN MOVING AROUND A LOT MORE THAN USUAL: 0
6. FEELING BAD ABOUT YOURSELF - OR THAT YOU ARE A FAILURE OR HAVE LET YOURSELF OR YOUR FAMILY DOWN: 0
SUM OF ALL RESPONSES TO PHQ QUESTIONS 1-9: 4
5. POOR APPETITE OR OVEREATING: 0
9. THOUGHTS THAT YOU WOULD BE BETTER OFF DEAD, OR OF HURTING YOURSELF: 0

## 2023-03-28 ASSESSMENT — ENCOUNTER SYMPTOMS
COUGH: 0
CONSTIPATION: 0
ABDOMINAL PAIN: 0
DIARRHEA: 0
SHORTNESS OF BREATH: 0
SINUS PAIN: 0
NAUSEA: 0
BACK PAIN: 0
ABDOMINAL DISTENTION: 0
COLOR CHANGE: 0
EYE DISCHARGE: 0
CHEST TIGHTNESS: 0
SINUS PRESSURE: 0

## 2023-03-28 NOTE — PATIENT INSTRUCTIONS
at time of . Instead of the fee, you can choose to have the paperwork filled out during a separate office visit that is for filling out the paperwork only. Medication Samples: This office does not carry medication samples. If you need assistance in getting your medications, then please let the medical assistant know so they can help you sign up for a drug assistance program that can help get medications at a reduced cost or even free (if you qualify). Workman's Comp Claims: We do not handle workman's comp cases or claims. You will need to go to an urgent care to be seen or to whomever your employer uses. General - Any abusive/rude behavior toward staff/providers may be cause for dismissal.    Kurt Nance may receive a survey regarding the care you received during your visit. Your input is valuable to us. We encourage you to complete and return your survey. We hope you will choose us in the future for your healthcare needs. Learning About Emotional Support  When do you need emotional support? You might find getting support from others helpful when you have a long-term health problem. Often people feel alone, confused, or scared when coping with an illness. But you aren't alone. Other people are going through the same thing you are and know how you feel. Talking with others about your feelings can help you feel better. Your family and friends can give you support. So can your doctor, a support group, or a [employer]. If you have a support network, you will not feel as alone. You will learn new ways to deal with your situation, and you may try harder to overcome it. Where you can get support  Family and friends: They can help you cope by giving you comfort and encouragement. Counseling: Professional counseling can help you cope with situations that interfere with your life and cause stress. Counseling can help you understand and deal with your illness.   Your doctor: Find a doctor you trust and feel

## 2023-03-28 NOTE — PROGRESS NOTES
long term life support  Complete living will and healthcare power of  packet with 2 witnesses unrelated to you or a notary and bring copy back to office for medical file and give copies to healthcare power of attorneys  Does not have living will paperwork on file  Needs living will  [address]   Alternates- Deana Uptoning, friend  No one after Nonnie Kocher or [address] Planning: Discussed the patients choices for care and treatment in case of a health event that adversely affects decision-making abilities. Also discussed the patients long-term treatment options. Reviewed with the patient the appropriate state-specific advance directive documents. Reviewed the process of designating a competent adult as an Agent (or -in-fact) that could take make health care decisions for the patient if incompetent. Patient was asked to complete the declaration forms, if they have not already, either acknowledge the forms by a public notary or an eligible witness and provide a signed copy to the practice office. Time spent (minutes): 30       Tobacco Use:  Tobacco Use: High Risk    Smoking Tobacco Use: Every Day    Smokeless Tobacco Use: Never    Passive Exposure: Not on file     E-cigarette/Vaping       Questions Responses    E-cigarette/Vaping Use Current Some Day User    Start Date     Passive Exposure     Quit Date     Counseling Given     Comments         Interventions:  Patient declined any further intervention or treatment  Recommended smoking cessation, did quit drinking    Tobacco Use Counseling: Patient was counseled on tobacco cessation. Based upon patient's motivation to change her behavior, the following plan was agreed upon: tobacco cessation classes/support groups. Educational materials regarding tobacco cessation were provided. Provider spent 5 minutes counseling patient.        LDCT Screening: Discussed with patient the benefits and harms of screening,

## 2023-05-07 DIAGNOSIS — R10.9 ABDOMINAL CRAMPING: ICD-10-CM

## 2023-05-07 DIAGNOSIS — F41.1 GAD (GENERALIZED ANXIETY DISORDER): ICD-10-CM

## 2023-05-08 RX ORDER — LORAZEPAM 1 MG/1
TABLET ORAL
Qty: 120 TABLET | Refills: 0 | Status: SHIPPED | OUTPATIENT
Start: 2023-05-08 | End: 2023-06-07

## 2023-05-08 RX ORDER — DICYCLOMINE HCL 20 MG
TABLET ORAL
Qty: 360 TABLET | Refills: 0 | Status: SHIPPED | OUTPATIENT
Start: 2023-05-08

## 2023-05-23 ENCOUNTER — OFFICE VISIT (OUTPATIENT)
Dept: FAMILY MEDICINE CLINIC | Age: 68
End: 2023-05-23
Payer: MEDICARE

## 2023-05-23 VITALS
WEIGHT: 182.2 LBS | OXYGEN SATURATION: 98 % | RESPIRATION RATE: 20 BRPM | HEART RATE: 84 BPM | SYSTOLIC BLOOD PRESSURE: 134 MMHG | BODY MASS INDEX: 26.98 KG/M2 | HEIGHT: 69 IN | DIASTOLIC BLOOD PRESSURE: 78 MMHG

## 2023-05-23 DIAGNOSIS — F90.2 ATTENTION DEFICIT HYPERACTIVITY DISORDER (ADHD), COMBINED TYPE: ICD-10-CM

## 2023-05-23 DIAGNOSIS — M53.3 CHRONIC SACROILIAC PAIN: Primary | ICD-10-CM

## 2023-05-23 DIAGNOSIS — G89.29 CHRONIC SACROILIAC PAIN: Primary | ICD-10-CM

## 2023-05-23 PROBLEM — F90.9 ADHD: Status: ACTIVE | Noted: 2023-05-23

## 2023-05-23 PROCEDURE — 3017F COLORECTAL CA SCREEN DOC REV: CPT | Performed by: NURSE PRACTITIONER

## 2023-05-23 PROCEDURE — G8399 PT W/DXA RESULTS DOCUMENT: HCPCS | Performed by: NURSE PRACTITIONER

## 2023-05-23 PROCEDURE — 1123F ACP DISCUSS/DSCN MKR DOCD: CPT | Performed by: NURSE PRACTITIONER

## 2023-05-23 PROCEDURE — G8427 DOCREV CUR MEDS BY ELIG CLIN: HCPCS | Performed by: NURSE PRACTITIONER

## 2023-05-23 PROCEDURE — 99213 OFFICE O/P EST LOW 20 MIN: CPT | Performed by: NURSE PRACTITIONER

## 2023-05-23 PROCEDURE — 1090F PRES/ABSN URINE INCON ASSESS: CPT | Performed by: NURSE PRACTITIONER

## 2023-05-23 PROCEDURE — G8417 CALC BMI ABV UP PARAM F/U: HCPCS | Performed by: NURSE PRACTITIONER

## 2023-05-23 PROCEDURE — 3078F DIAST BP <80 MM HG: CPT | Performed by: NURSE PRACTITIONER

## 2023-05-23 PROCEDURE — 4004F PT TOBACCO SCREEN RCVD TLK: CPT | Performed by: NURSE PRACTITIONER

## 2023-05-23 PROCEDURE — G9899 SCRN MAM PERF RSLTS DOC: HCPCS | Performed by: NURSE PRACTITIONER

## 2023-05-23 PROCEDURE — 3075F SYST BP GE 130 - 139MM HG: CPT | Performed by: NURSE PRACTITIONER

## 2023-05-23 RX ORDER — ARIPIPRAZOLE 2 MG/1
2 TABLET ORAL DAILY
COMMUNITY
Start: 2023-05-19

## 2023-05-23 RX ORDER — DEXTROAMPHETAMINE SACCHARATE, AMPHETAMINE ASPARTATE, DEXTROAMPHETAMINE SULFATE AND AMPHETAMINE SULFATE 2.5; 2.5; 2.5; 2.5 MG/1; MG/1; MG/1; MG/1
TABLET ORAL
COMMUNITY
Start: 2023-05-22

## 2023-05-23 RX ORDER — METHYLPREDNISOLONE 4 MG/1
TABLET ORAL
Qty: 1 KIT | Refills: 0 | Status: SHIPPED | OUTPATIENT
Start: 2023-05-23 | End: 2023-05-29

## 2023-05-23 ASSESSMENT — ENCOUNTER SYMPTOMS
COLOR CHANGE: 0
SINUS PAIN: 0
SHORTNESS OF BREATH: 0
CONSTIPATION: 0
COUGH: 0
SINUS PRESSURE: 0
ABDOMINAL PAIN: 0
DIARRHEA: 0
BACK PAIN: 1
EYE DISCHARGE: 0
CHEST TIGHTNESS: 0
NAUSEA: 0
ABDOMINAL DISTENTION: 0

## 2023-05-23 NOTE — PROGRESS NOTES
Date of Service:  2023    Michael Weaver (:  1955) is a 78 y.o. female, here for evaluation of the following medical concerns:    Chief Complaint   Patient presents with    Tailbone Pain     Sat hard on floor 5 mons ago, worsening going down legs now and into back        HPI    Tailbone Pain  Sat hard on floor 5 months ago and now having worsening pain going down legs and up into her back. Patient says she got tangled up with her dog and plopped onto her butt accidentally when she was about a foot up from the floor and went right onto her tailbone. She said after sitting on the edge of the bathtub 3 weeks ago, she started having the pain in her tailbone again and some pain into her lower back. She said she had a little prednisone at home and that helped a little but only had a couple pills. She tried ibuprofen and that helped a little. No loss of control of bowel or bladder. Pain down the back of her legs, feels like a muscle type pain, happens on both legs. Going up and down stairs is painful. Review of Systems   Constitutional:  Negative for activity change, appetite change, fatigue, fever and unexpected weight change. HENT:  Negative for congestion, ear pain, sinus pressure and sinus pain. Eyes:  Negative for discharge and visual disturbance. Respiratory:  Negative for cough, chest tightness and shortness of breath. Cardiovascular:  Negative for chest pain, palpitations and leg swelling. Gastrointestinal:  Negative for abdominal distention, abdominal pain, constipation, diarrhea and nausea. Endocrine: Negative for cold intolerance, heat intolerance, polydipsia, polyphagia and polyuria. Genitourinary:  Negative for decreased urine volume, difficulty urinating, dysuria, flank pain, frequency and urgency. Musculoskeletal:  Positive for arthralgias and back pain. Negative for gait problem, joint swelling, myalgias and neck pain.    Skin:  Negative for color change, rash

## 2023-05-23 NOTE — PATIENT INSTRUCTIONS
Go to [facility], [facility], or [facility] Monday through Friday 8-4, no appointment needed, check in at desk and [place] are first come first serve. GENERAL OFFICE POLICIES      Telephone Calls: Messages will be answered within 1-2 business days, unless the provider is out of the office. If it is urgent a covering provider will answer. (this does not include Medication refills). MyChart: We recommend all patients sign up for [employer]. Through this portal you can see your lab results, request refills, schedule appointments, pay your bill and send messages to the office. [employer] messages will be answered within 1-2 business days unless the provider is out of the office. For urgent matters, please call the office. Appointments:  All appointments must be scheduled. We ask all patients to schedule their next follow up appointment before they leave the office to make sure you will be able to be seen before you run out of medications. 24 hours notice is required to cancel or reschedule an appointment to avoid being marked as a no show. You may be dismissed from the practice after 3 no shows. LATE for Appointment: If you are 15 or more minutes late for your appointment, you may be asked to reschedule. MA/LAB APPTS: Must be scheduled, cannot accept walk in lab visits. We only draw labs for patients established in our office. We only do injections for medications ordered by our office. Acute Sick Visits:  Nothing other than acute complaint will be addressed at this visit. TRADITIONAL MEDICARE  DOES NOT COVER PHYSICALS  MEDICARE WELLNESS VISITS: These are NOT physicals but the free annual visit offered by Medicare to discuss wellness issues. Medication refills, checkups, etc. will not be addressed during this visit. Medication Refills: Refills are handled electronically so please contact your pharmacy for medication refills even if current refills have been exhausted.  If you are on a controlled

## 2023-06-18 ENCOUNTER — TELEPHONE (OUTPATIENT)
Dept: CASE MANAGEMENT | Age: 68
End: 2023-06-18

## 2023-06-20 ENCOUNTER — PATIENT MESSAGE (OUTPATIENT)
Dept: FAMILY MEDICINE CLINIC | Age: 68
End: 2023-06-20

## 2023-06-20 DIAGNOSIS — M53.3 CHRONIC SACROILIAC PAIN: Primary | ICD-10-CM

## 2023-06-20 DIAGNOSIS — M47.27 LUMBOSACRAL SPONDYLOSIS WITH RADICULOPATHY: ICD-10-CM

## 2023-06-20 DIAGNOSIS — G89.29 CHRONIC SACROILIAC PAIN: Primary | ICD-10-CM

## 2023-06-20 NOTE — TELEPHONE ENCOUNTER
Remind patient she is due for lung cancer screening if interested.  Has received letter reminders in mail as well

## 2023-06-21 DIAGNOSIS — F41.1 GAD (GENERALIZED ANXIETY DISORDER): ICD-10-CM

## 2023-06-21 NOTE — TELEPHONE ENCOUNTER
From: Martita WORKMAN  To: Liane Weaver  Sent: 6/20/2023 1:19 PM EDT  Subject: Certified Lung Cancer Screening Reminder    Dear Yvonne Maxwell: Your good health is important to us therefore, we are sending you this friendly reminder. As always, our goal is to be your partner in life-long wellness. Our records indicate that you need to make an appointment for the follow up imaging that was recommended as a result of your Lung Screening exam done on 4/12/22. The recommendation was due on approximately 4/12/2023. Please call your physician to obtain an order and schedule this examination at your earliest convenience. If the order has already been obtained then you may call [phone] to schedule your appointment. If you have had this study done elsewhere, please inform your referring provider so we can update our records. If you have recently scheduled your appointment, kindly disregard this reminder.      Thank you for allowing us to be a part of your health care team.      Sincerely,     [address] Screening Program

## 2023-06-23 RX ORDER — LORAZEPAM 1 MG/1
TABLET ORAL
Qty: 120 TABLET | Refills: 0 | Status: SHIPPED | OUTPATIENT
Start: 2023-06-23 | End: 2023-07-23

## 2023-07-05 ENCOUNTER — PATIENT MESSAGE (OUTPATIENT)
Dept: FAMILY MEDICINE CLINIC | Age: 68
End: 2023-07-05

## 2023-07-05 DIAGNOSIS — M47.27 LUMBOSACRAL SPONDYLOSIS WITH RADICULOPATHY: Primary | ICD-10-CM

## 2023-07-05 DIAGNOSIS — E78.2 MIXED HYPERLIPIDEMIA: ICD-10-CM

## 2023-07-05 RX ORDER — EZETIMIBE 10 MG/1
10 TABLET ORAL DAILY
Qty: 90 TABLET | Refills: 0 | Status: SHIPPED | OUTPATIENT
Start: 2023-07-05

## 2023-07-05 NOTE — TELEPHONE ENCOUNTER
From: Bruno Griffin  To: Niels Washington  Sent: 7/5/2023 10:15 AM EDT  Subject: Back    Hi, Catrachita ordered a sacral and pelvic X-Ray to go get if my back didn't get better,when I saw her last. I intend to go tomorrow. Will you add the lumbar to that ?  Thanks, Kim

## 2023-07-05 NOTE — TELEPHONE ENCOUNTER
5/23/23 CB    1. Chronic sacroiliac pain  -     XR SACRUM COCCYX (MIN 2 VIEWS); Future  -     methylPREDNISolone (MEDROLHUGO,) 4 MG tablet; Take by mouth., Disp-1 kit, R-0Normal        Go to [facility] Monday through Friday 8-4, no appointment needed, check in at desk and [facility]ussing are first come first serve.          Says she does OK with steroids but they make it hard for her to sleep and irritable but willing to take because they help        Exercises given        Xray to rule out fracture since pain keeps reoccurring        Recommend physical therapy if persists    AM

## 2023-07-07 DIAGNOSIS — I10 ESSENTIAL HYPERTENSION: ICD-10-CM

## 2023-07-07 RX ORDER — CLONIDINE HYDROCHLORIDE 0.1 MG/1
TABLET ORAL
Qty: 270 TABLET | Refills: 0 | Status: SHIPPED | OUTPATIENT
Start: 2023-07-07

## 2023-07-09 DIAGNOSIS — R06.02 SHORTNESS OF BREATH: ICD-10-CM

## 2023-07-10 ENCOUNTER — HOSPITAL ENCOUNTER (OUTPATIENT)
Age: 68
Discharge: HOME | End: 2023-07-10
Payer: MEDICARE

## 2023-07-10 ENCOUNTER — HOSPITAL ENCOUNTER (OUTPATIENT)
Dept: GENERAL RADIOLOGY | Age: 68
Discharge: HOME | End: 2023-07-10
Payer: MEDICARE

## 2023-07-10 DIAGNOSIS — M53.3 CHRONIC SACROILIAC PAIN: ICD-10-CM

## 2023-07-10 DIAGNOSIS — M47.27 LUMBOSACRAL SPONDYLOSIS WITH RADICULOPATHY: ICD-10-CM

## 2023-07-10 DIAGNOSIS — G89.29 CHRONIC SACROILIAC PAIN: ICD-10-CM

## 2023-07-10 PROCEDURE — 72100 X-RAY EXAM L-S SPINE 2/3 VWS: CPT

## 2023-07-10 PROCEDURE — 72220 X-RAY EXAM SACRUM TAILBONE: CPT

## 2023-07-10 RX ORDER — AZITHROMYCIN 250 MG/1
TABLET, FILM COATED ORAL
Qty: 6 TABLET | Refills: 0 | OUTPATIENT
Start: 2023-07-10

## 2023-07-28 ENCOUNTER — PATIENT MESSAGE (OUTPATIENT)
Dept: FAMILY MEDICINE CLINIC | Age: 68
End: 2023-07-28

## 2023-07-28 DIAGNOSIS — F41.1 GAD (GENERALIZED ANXIETY DISORDER): ICD-10-CM

## 2023-07-28 RX ORDER — LORAZEPAM 1 MG/1
TABLET ORAL
Qty: 120 TABLET | Refills: 0 | Status: SHIPPED | OUTPATIENT
Start: 2023-07-28 | End: 2023-08-27

## 2023-07-28 NOTE — TELEPHONE ENCOUNTER
From: Sera Weaver  To: Carmelo Felty  Sent: 7/28/2023 4:14 PM EDT  Subject: Ativan    Hi, I called johnnie,forgot to message you. I need my ativan filled. Can someone call today?  Thanks, Kim

## 2023-08-01 ENCOUNTER — HOSPITAL ENCOUNTER (EMERGENCY)
Age: 68
Discharge: HOME | End: 2023-08-01
Payer: MEDICARE

## 2023-08-01 ENCOUNTER — APPOINTMENT (OUTPATIENT)
Dept: CT IMAGING | Age: 68
End: 2023-08-01
Payer: MEDICARE

## 2023-08-01 VITALS
BODY MASS INDEX: 27 KG/M2 | HEIGHT: 68 IN | DIASTOLIC BLOOD PRESSURE: 72 MMHG | RESPIRATION RATE: 18 BRPM | WEIGHT: 178.13 LBS | TEMPERATURE: 98.8 F | SYSTOLIC BLOOD PRESSURE: 148 MMHG | OXYGEN SATURATION: 99 % | HEART RATE: 65 BPM

## 2023-08-01 DIAGNOSIS — R29.898 WEAKNESS OF RIGHT HIP: Primary | ICD-10-CM

## 2023-08-01 PROCEDURE — 99284 EMERGENCY DEPT VISIT MOD MDM: CPT

## 2023-08-01 PROCEDURE — 6360000002 HC RX W HCPCS: Performed by: PHYSICIAN ASSISTANT

## 2023-08-01 PROCEDURE — 96372 THER/PROPH/DIAG INJ SC/IM: CPT

## 2023-08-01 PROCEDURE — 73700 CT LOWER EXTREMITY W/O DYE: CPT

## 2023-08-01 PROCEDURE — 6370000000 HC RX 637 (ALT 250 FOR IP): Performed by: PHYSICIAN ASSISTANT

## 2023-08-01 RX ORDER — TRAMADOL HYDROCHLORIDE 50 MG/1
50 TABLET ORAL ONCE
Status: COMPLETED | OUTPATIENT
Start: 2023-08-01 | End: 2023-08-01

## 2023-08-01 RX ORDER — KETOROLAC TROMETHAMINE 30 MG/ML
30 INJECTION, SOLUTION INTRAMUSCULAR; INTRAVENOUS ONCE
Status: COMPLETED | OUTPATIENT
Start: 2023-08-01 | End: 2023-08-01

## 2023-08-01 RX ADMIN — TRAMADOL HYDROCHLORIDE 50 MG: 50 TABLET ORAL at 12:48

## 2023-08-01 RX ADMIN — KETOROLAC TROMETHAMINE 30 MG: 30 INJECTION, SOLUTION INTRAMUSCULAR at 12:50

## 2023-08-01 ASSESSMENT — PAIN DESCRIPTION - LOCATION
LOCATION: HIP
LOCATION: HIP

## 2023-08-01 ASSESSMENT — PAIN DESCRIPTION - ORIENTATION
ORIENTATION: RIGHT
ORIENTATION: RIGHT

## 2023-08-01 ASSESSMENT — LIFESTYLE VARIABLES
HOW MANY STANDARD DRINKS CONTAINING ALCOHOL DO YOU HAVE ON A TYPICAL DAY: 1 OR 2
HOW OFTEN DO YOU HAVE A DRINK CONTAINING ALCOHOL: MONTHLY OR LESS

## 2023-08-01 ASSESSMENT — PAIN SCALES - GENERAL
PAINLEVEL_OUTOF10: 6
PAINLEVEL_OUTOF10: 8

## 2023-08-01 NOTE — ED PROVIDER NOTES
**ADVANCED PRACTICE PROVIDER, I HAVE EVALUATED THIS PATIENT**        [address] ENCOUNTER      Pt Name: Anabel Pratt  EWM:[id number]  [address] [facility] 1955  Date of evaluation: 8/1/2023  Provider: Husam Rosenberg PA-C  Note Started: 12:53 PM EDT 8/1/2023        Chief Complaint:    Chief Complaint   Patient presents with    Hip Pain     Right hip pain after seeing PCP. Pt had pain and wanted checked out; PCP moved leg & they heard a pop. Pt has been experiencing pain ever since. X-ray shows deterioration in SI joint but pt is concerned now bc decreased ADLS         Nursing Notes, Past Medical Hx, Past Surgical Hx, Social Hx, Allergies, and Family Hx were all reviewed and agreed with or any disagreements were addressed in the HPI.    HPI: (Location, Duration, Timing, Severity, Quality, Assoc Sx, Context, Modifying factors)    History From: patient          Chief Complaint of Patient sacral pain as well as right hip pain and feeling of instability in that right hip    This is a  78 y.o. female who presents stating that she has been having some issues with the sacrum and tenderness related. Then starting a couple of months ago she started having more issues with her right hip. She states that her right lower extremity was hyperextended and a clinical exam and since then she has been hobbling and limping and feeling worse. Especially the last couple of weeks or so it feels like the right hip joint feels unstable to her. She is walking with quite a limp and still feels like that hip wants to give out. Because of this she decided to come to the ER today. Patient denies taking any medication other than ibuprofen this morning around 6 AM for her low back and hip pain. She has had x-rays outpatient. She states that she did see the primary care provider a couple of months ago but has not followed up in person since then.    She denies fevers or chills,

## 2023-08-01 NOTE — ED NOTES
Discharge and education instructions reviewed. Patient verbalized understanding, teach-back successful. Patient denied questions at this time. No acute distress noted. Patient instructed to follow-up as noted - return to emergency department if symptoms worsen. Patient verbalized understanding. Discharged per EDMD with discharged instructions.        Dolores Corbett RN  08/01/23 1552

## 2023-08-01 NOTE — DISCHARGE INSTRUCTIONS
Ambulate with care, consider using a cane or assistive device to help with safety. Use ice or heat locally to help with discomfort as tolerated and over-the-counter Tylenol or ibuprofen. Follow-up with orthopedics at next available appointment for further care and treatment. Return to the ER for any emergency worsening or concern.

## 2023-08-19 ASSESSMENT — SOCIAL DETERMINANTS OF HEALTH (SDOH)
WITHIN THE LAST YEAR, HAVE YOU BEEN HUMILIATED OR EMOTIONALLY ABUSED IN OTHER WAYS BY YOUR PARTNER OR EX-PARTNER?: NO
WITHIN THE LAST YEAR, HAVE YOU BEEN AFRAID OF YOUR PARTNER OR EX-PARTNER?: NO
WITHIN THE LAST YEAR, HAVE TO BEEN RAPED OR FORCED TO HAVE ANY KIND OF SEXUAL ACTIVITY BY YOUR PARTNER OR EX-PARTNER?: NO
WITHIN THE LAST YEAR, HAVE YOU BEEN KICKED, HIT, SLAPPED, OR OTHERWISE PHYSICALLY HURT BY YOUR PARTNER OR EX-PARTNER?: NO

## 2023-08-21 SDOH — HEALTH STABILITY: PHYSICAL HEALTH: ON AVERAGE, HOW MANY MINUTES DO YOU ENGAGE IN EXERCISE AT THIS LEVEL?: 0 MIN

## 2023-08-21 ASSESSMENT — SOCIAL DETERMINANTS OF HEALTH (SDOH)
WITHIN THE LAST YEAR, HAVE YOU BEEN KICKED, HIT, SLAPPED, OR OTHERWISE PHYSICALLY HURT BY YOUR PARTNER OR EX-PARTNER?: NO
WITHIN THE LAST YEAR, HAVE YOU BEEN AFRAID OF YOUR PARTNER OR EX-PARTNER?: NO
WITHIN THE LAST YEAR, HAVE YOU BEEN HUMILIATED OR EMOTIONALLY ABUSED IN OTHER WAYS BY YOUR PARTNER OR EX-PARTNER?: NO
WITHIN THE LAST YEAR, HAVE TO BEEN RAPED OR FORCED TO HAVE ANY KIND OF SEXUAL ACTIVITY BY YOUR PARTNER OR EX-PARTNER?: NO

## 2023-08-22 ENCOUNTER — OFFICE VISIT (OUTPATIENT)
Dept: ORTHOPEDIC SURGERY | Age: 68
End: 2023-08-22
Payer: MEDICARE

## 2023-08-22 VITALS — BODY MASS INDEX: 26.98 KG/M2 | WEIGHT: 178 LBS | HEIGHT: 68 IN

## 2023-08-22 DIAGNOSIS — M51.36 LUMBAR DEGENERATIVE DISC DISEASE: Primary | ICD-10-CM

## 2023-08-22 PROCEDURE — G8399 PT W/DXA RESULTS DOCUMENT: HCPCS | Performed by: ORTHOPAEDIC SURGERY

## 2023-08-22 PROCEDURE — 4004F PT TOBACCO SCREEN RCVD TLK: CPT | Performed by: ORTHOPAEDIC SURGERY

## 2023-08-22 PROCEDURE — 1090F PRES/ABSN URINE INCON ASSESS: CPT | Performed by: ORTHOPAEDIC SURGERY

## 2023-08-22 PROCEDURE — 99203 OFFICE O/P NEW LOW 30 MIN: CPT | Performed by: ORTHOPAEDIC SURGERY

## 2023-08-22 PROCEDURE — 3017F COLORECTAL CA SCREEN DOC REV: CPT | Performed by: ORTHOPAEDIC SURGERY

## 2023-08-22 PROCEDURE — G8417 CALC BMI ABV UP PARAM F/U: HCPCS | Performed by: ORTHOPAEDIC SURGERY

## 2023-08-22 PROCEDURE — 1123F ACP DISCUSS/DSCN MKR DOCD: CPT | Performed by: ORTHOPAEDIC SURGERY

## 2023-08-22 PROCEDURE — G8427 DOCREV CUR MEDS BY ELIG CLIN: HCPCS | Performed by: ORTHOPAEDIC SURGERY

## 2023-08-22 PROCEDURE — G9899 SCRN MAM PERF RSLTS DOC: HCPCS | Performed by: ORTHOPAEDIC SURGERY

## 2023-08-22 NOTE — PROGRESS NOTES
New Patient: LUMBAR SPINE    Referring Provider:  MEENA Mckeon*    CHIEF COMPLAINT: Low back pain    HISTORY OF PRESENT ILLNESS:    Ms. Luis Felipe Rey  is a pleasant 80 y.o. female presents today for the evaluation of pain in her buttocks and weakness of her legs causing falls. Her symptoms have slowly increased over the last several years. She rates her buttock pain 8/10 she denies numbness and tingling of her lower extremities, saddle anesthesia and bowel or bladder abnormality.     Current/Past Treatment:   Physical Therapy: yes  Chiropractic:  no   Injection:  yes   Medications: Steroids and Zanaflex    Past Medical History:   Past Medical History:   Diagnosis Date    ADHD     Anxiety state, unspecified     Arthritis     Chronic back pain     Tried spinal injections    Depressive disorder, not elsewhere classified     Duodenal ulcer, unspecified as acute or chronic, without hemorrhage, perforation, or obstruction     Essential hypertension 02/22/2016    Fibromyalgia     GERD (gastroesophageal reflux disease)     Hyperlipidemia     IBS (irritable bowel syndrome)     Seasonal allergies     Sleep disorder       Past Surgical History:     Past Surgical History:   Procedure Laterality Date    ABDOMINAL ADHESION SURGERY      BREAST BIOPSY Bilateral 6/16/2022    RADIOFREQUENCY IDENTIFICATION LOCALIZED LEFT BREAST LUMPECTOMY, RADIOFREQUENCY IDENTIFIED LOCALIZED RIGHT BREAST LUMPECTOMY, RIGHT AXILLARY SENTINEL NODE BIOPSY WITH RADIOISOTOPE, RFID localized right breast excisional biopsy performed by Martín Nicole MD at [address] TEST  02/2016    negative    CHOLECYSTECTOMY      COLONOSCOPY  9/13    negative    DENTAL SURGERY      HIATAL HERNIA REPAIR  09/07/2018    LAPAROSCOPIC HIATAL HERNIA, LAPAROSCOPIC NISSEN FUNDOPLICATION WITH MESH    HYSTERECTOMY (CERVIX STATUS UNKNOWN)      LAPAROSCOPIC NISSEN FUNDOPLICATION      ROSELIA BREAST LOC ADDITIONAL LESION LEFT Left 6/10/2022

## 2023-08-22 NOTE — PROGRESS NOTES
Discussed with the patient the current USPSTF guidelines released March 9, 2021 for screening for lung cancer. For adults aged 48 to 80 years who have a 20 pack-year smoking history and currently smoke or have quit within the past 15 years the grade B recommendation is to:  Screen for lung cancer with low-dose computed tomography (LDCT) every year. Stop screening once a person has not smoked for 15 years or has a health problem that limits life expectancy or the ability to have lung surgery. The patient  reports that she has been smoking cigarettes. She has a 40.00 pack-year smoking history. She has never used smokeless tobacco.. Discussed with patient the risks and benefits of screening, including over-diagnosis, false positive rate, and total radiation exposure. The patient currently exhibits no signs or symptoms suggestive of lung cancer. Discussed with patient the importance of compliance with yearly annual lung cancer screenings and willingness to undergo diagnosis and treatment if screening scan is positive. In addition, the patient was counseled regarding the importance of remaining smoke free and/or total smoking cessation.     Also reviewed the following if the patient has Medicare that as of February 10, 2022, Medicare only covers LDCT screening in patients aged 53-69 with at least a 20 pack-year smoking history who currently smoke or have quit in the last 15 years

## 2023-08-22 NOTE — PATIENT INSTRUCTIONS

## 2023-08-24 ENCOUNTER — OFFICE VISIT (OUTPATIENT)
Dept: FAMILY MEDICINE CLINIC | Age: 68
End: 2023-08-24

## 2023-08-24 VITALS
DIASTOLIC BLOOD PRESSURE: 74 MMHG | HEART RATE: 74 BPM | OXYGEN SATURATION: 96 % | SYSTOLIC BLOOD PRESSURE: 130 MMHG | BODY MASS INDEX: 26.67 KG/M2 | WEIGHT: 176 LBS | HEIGHT: 68 IN

## 2023-08-24 DIAGNOSIS — M47.27 LUMBOSACRAL SPONDYLOSIS WITH RADICULOPATHY: ICD-10-CM

## 2023-08-24 DIAGNOSIS — E78.2 MIXED HYPERLIPIDEMIA: ICD-10-CM

## 2023-08-24 DIAGNOSIS — Z87.891 PERSONAL HISTORY OF TOBACCO USE: ICD-10-CM

## 2023-08-24 DIAGNOSIS — C50.911 DUCTAL CARCINOMA OF RIGHT BREAST (HCC): ICD-10-CM

## 2023-08-24 DIAGNOSIS — I10 ESSENTIAL HYPERTENSION: Primary | ICD-10-CM

## 2023-08-24 DIAGNOSIS — Z78.0 POST-MENOPAUSAL: ICD-10-CM

## 2023-08-24 DIAGNOSIS — I73.81 ERYTHROMELALGIA (HCC): ICD-10-CM

## 2023-08-24 RX ORDER — ATENOLOL 50 MG/1
50 TABLET ORAL DAILY
Qty: 90 TABLET | Refills: 3 | Status: SHIPPED | OUTPATIENT
Start: 2023-08-24

## 2023-08-24 RX ORDER — TRIAMTERENE AND HYDROCHLOROTHIAZIDE 37.5; 25 MG/1; MG/1
1 TABLET ORAL DAILY
Qty: 90 TABLET | Refills: 3 | Status: SHIPPED | OUTPATIENT
Start: 2023-08-24

## 2023-08-24 RX ORDER — DEXTROAMPHETAMINE SACCHARATE, AMPHETAMINE ASPARTATE, DEXTROAMPHETAMINE SULFATE AND AMPHETAMINE SULFATE 3.75; 3.75; 3.75; 3.75 MG/1; MG/1; MG/1; MG/1
1 TABLET ORAL 2 TIMES DAILY
COMMUNITY
Start: 2023-08-17

## 2023-08-24 RX ORDER — CLONIDINE HYDROCHLORIDE 0.1 MG/1
TABLET ORAL
Qty: 270 TABLET | Refills: 1 | Status: SHIPPED | OUTPATIENT
Start: 2023-08-24

## 2023-08-24 ASSESSMENT — ENCOUNTER SYMPTOMS
CHEST TIGHTNESS: 0
COLOR CHANGE: 0
SHORTNESS OF BREATH: 0
NAUSEA: 0
ABDOMINAL PAIN: 0
COUGH: 0
BACK PAIN: 1
WHEEZING: 0
VOMITING: 0

## 2023-08-24 NOTE — PROGRESS NOTES
Sera Weaver (:  1955) is a 80 y.o. female,Established patient, here for evaluation of the following chief complaint(s):  Hypertension (ROUTINE HTN ) and Other (COLOGUARD)         ASSESSMENT/PLAN:  1. Essential hypertension  -Well-controlled with atenolol, clonidine, Maxide.  -Continue healthy lifestyle modification.  -Follow-up in 6 months for AWV with fasting blood work. -     Comprehensive Metabolic Panel; Future  -     atenolol (TENORMIN) 50 MG tablet; Take 1 tablet by mouth daily, Disp-90 tablet, R-3Normal  -     cloNIDine (CATAPRES) 0.1 MG tablet; TAKE 1 TABLET BY MOUTH TWICE DAILY, TAKE EXTRA TABLET DAILY AS NEEDED FOR SYSTOLIC BLOOD PRESSURE GREATER THAN 170, Disp-270 tablet, R-1Normal  -     triamterene-hydroCHLOROthiazide (MAXZIDE-25) 37.5-25 MG per tablet; Take 1 tablet by mouth daily, Disp-90 tablet, R-3Normal  2. Erythromelalgia ([address])  -Doing well at this time. 3. Ductal carcinoma of right breast (HCC)  -Resolved with radiation and surgery. 4. Personal history of tobacco use  -     GA VISIT TO DISCUSS LUNG CA SCREEN W LDCT  -     CT Lung Screen (Initial/Annual/Baseline); Future  5. Lumbosacral spondylosis with radiculopathy  -Evaluated by Ortho, referred to neurology, did not get referral.  -Referral placed. Information provided to the patient. Educated it is their responsibility to follow-up on this. The patient verbalized understanding.  -Follow-up with neurology. -     Charla Vega MD, Neurosurgery (Spine), [facility]  6. Post-menopausal  -     DEXA BONE DENSITY AXIAL SKELETON; Future  7. Mixed hyperlipidemia  -     Lipid Panel; Future      Return in about 6 months (around 2024) for Fasting Labs, Physical.         Subjective   SUBJECTIVE/OBJECTIVE:  HPI  Her hip weakness could be due to nerve per Dr Indira Bloom, so she has been referred to neurology. She has lower back and AS issues as well. Adderall and Abilify have been helping a lot.  Her blood pressure has

## 2023-09-05 ENCOUNTER — PATIENT MESSAGE (OUTPATIENT)
Dept: FAMILY MEDICINE CLINIC | Age: 68
End: 2023-09-05

## 2023-09-05 DIAGNOSIS — F41.1 GAD (GENERALIZED ANXIETY DISORDER): ICD-10-CM

## 2023-09-05 RX ORDER — LORAZEPAM 1 MG/1
TABLET ORAL
Qty: 120 TABLET | Refills: 0 | Status: SHIPPED | OUTPATIENT
Start: 2023-09-05 | End: 2023-10-05

## 2023-09-05 NOTE — TELEPHONE ENCOUNTER
From: Bruno Griffin  To: Niels Washington  Sent: 9/5/2023 11:55 AM EDT  Subject: Ativan    Need my ativan 1mg ordered, not on my med list. Please call for me. Thanks! I take it q6h, thanks.

## 2023-10-04 DIAGNOSIS — F41.1 GAD (GENERALIZED ANXIETY DISORDER): ICD-10-CM

## 2023-10-04 DIAGNOSIS — F41.8 DEPRESSION WITH ANXIETY: ICD-10-CM

## 2023-10-04 DIAGNOSIS — J30.2 SEASONAL ALLERGIES: ICD-10-CM

## 2023-10-04 DIAGNOSIS — M47.816 FACET ARTHROPATHY, LUMBAR: ICD-10-CM

## 2023-10-04 DIAGNOSIS — R10.9 ABDOMINAL CRAMPING: ICD-10-CM

## 2023-10-04 DIAGNOSIS — I10 ESSENTIAL HYPERTENSION: ICD-10-CM

## 2023-10-04 DIAGNOSIS — E78.2 MIXED HYPERLIPIDEMIA: ICD-10-CM

## 2023-10-05 RX ORDER — IBUPROFEN 400 MG/1
400 TABLET ORAL EVERY 6 HOURS PRN
Qty: 360 TABLET | Refills: 0 | Status: SHIPPED | OUTPATIENT
Start: 2023-10-05

## 2023-10-05 RX ORDER — EZETIMIBE 10 MG/1
10 TABLET ORAL DAILY
Qty: 90 TABLET | Refills: 0 | Status: SHIPPED | OUTPATIENT
Start: 2023-10-05

## 2023-10-05 RX ORDER — TIZANIDINE 4 MG/1
TABLET ORAL
Qty: 270 TABLET | Refills: 0 | Status: SHIPPED | OUTPATIENT
Start: 2023-10-05

## 2023-10-05 RX ORDER — LORAZEPAM 1 MG/1
TABLET ORAL
Qty: 120 TABLET | Refills: 0 | Status: SHIPPED | OUTPATIENT
Start: 2023-10-05 | End: 2023-11-03

## 2023-10-05 RX ORDER — TRIAMTERENE AND HYDROCHLOROTHIAZIDE 37.5; 25 MG/1; MG/1
1 TABLET ORAL DAILY
Qty: 90 TABLET | Refills: 3 | OUTPATIENT
Start: 2023-10-05

## 2023-10-05 RX ORDER — CLONIDINE HYDROCHLORIDE 0.1 MG/1
TABLET ORAL
Qty: 270 TABLET | Refills: 1 | OUTPATIENT
Start: 2023-10-05

## 2023-10-05 RX ORDER — DICYCLOMINE HCL 20 MG
20 TABLET ORAL EVERY 6 HOURS PRN
Qty: 360 TABLET | Refills: 0 | Status: SHIPPED | OUTPATIENT
Start: 2023-10-05

## 2023-10-05 RX ORDER — ATENOLOL 50 MG/1
50 TABLET ORAL DAILY
Qty: 90 TABLET | Refills: 3 | OUTPATIENT
Start: 2023-10-05

## 2023-10-05 RX ORDER — HYDROXYZINE HYDROCHLORIDE 25 MG/1
TABLET, FILM COATED ORAL
Qty: 120 TABLET | Refills: 0 | Status: SHIPPED | OUTPATIENT
Start: 2023-10-05

## 2023-10-06 RX ORDER — FLUTICASONE PROPIONATE 50 MCG
SPRAY, SUSPENSION (ML) NASAL
Qty: 48 G | Refills: 5 | OUTPATIENT
Start: 2023-10-06

## 2023-10-09 DIAGNOSIS — I10 ESSENTIAL HYPERTENSION: ICD-10-CM

## 2023-10-09 RX ORDER — CLONIDINE HYDROCHLORIDE 0.1 MG/1
TABLET ORAL
Qty: 270 TABLET | Refills: 1 | OUTPATIENT
Start: 2023-10-09

## 2023-10-09 NOTE — TELEPHONE ENCOUNTER
LV 8/24/23 WITH LG FOR HTN NV 10/24/23   cloNIDine (CATAPRES) 0.1 MG tablet 270 tablet 1 8/24/2023     Sig: TAKE 1 TABLET BY MOUTH TWICE DAILY, TAKE EXTRA TABLET DAILY AS NEEDED FOR SYSTOLIC BLOOD PRESSURE GREATER THAN 170    Sent to pharmacy as: cloNIDine HCl 0.1 MG Oral Tablet (CATAPRES)    E-Prescribing Status: Receipt confirmed by pharmacy (8/24/2023 47:74 AM EDT)    DUPLICATE REQUEST

## 2023-10-10 DIAGNOSIS — J30.2 SEASONAL ALLERGIES: ICD-10-CM

## 2023-10-10 DIAGNOSIS — F41.1 GAD (GENERALIZED ANXIETY DISORDER): ICD-10-CM

## 2023-10-10 RX ORDER — IBUPROFEN 400 MG/1
400 TABLET ORAL EVERY 6 HOURS PRN
Qty: 360 TABLET | Refills: 0 | OUTPATIENT
Start: 2023-10-10

## 2023-10-10 RX ORDER — FLUTICASONE PROPIONATE 50 MCG
SPRAY, SUSPENSION (ML) NASAL
Qty: 48 G | Refills: 0 | Status: SHIPPED | OUTPATIENT
Start: 2023-10-10

## 2023-10-10 RX ORDER — BUSPIRONE HYDROCHLORIDE 15 MG/1
15 TABLET ORAL 2 TIMES DAILY
Qty: 180 TABLET | Refills: 0 | Status: SHIPPED | OUTPATIENT
Start: 2023-10-10

## 2023-10-10 NOTE — TELEPHONE ENCOUNTER
Last visit 08/24   Next visit 12/07
42 years old   Serology and GBS negative.  Anemia  Hypothyroidism.

## 2023-10-24 ENCOUNTER — HOSPITAL ENCOUNTER (OUTPATIENT)
Dept: CT IMAGING | Age: 68
Discharge: HOME | End: 2023-10-24
Payer: MEDICARE

## 2023-10-24 ENCOUNTER — HOSPITAL ENCOUNTER (OUTPATIENT)
Dept: WOMENS IMAGING | Age: 68
Discharge: HOME | End: 2023-10-24
Payer: MEDICARE

## 2023-10-24 DIAGNOSIS — Z87.891 PERSONAL HISTORY OF TOBACCO USE: ICD-10-CM

## 2023-10-24 DIAGNOSIS — Z78.0 POST-MENOPAUSAL: ICD-10-CM

## 2023-10-24 PROCEDURE — 71271 CT THORAX LUNG CANCER SCR C-: CPT

## 2023-10-24 PROCEDURE — 77080 DXA BONE DENSITY AXIAL: CPT

## 2023-10-25 ENCOUNTER — PATIENT MESSAGE (OUTPATIENT)
Dept: FAMILY MEDICINE CLINIC | Age: 68
End: 2023-10-25

## 2023-10-25 NOTE — TELEPHONE ENCOUNTER
From: Rei Dai  To: Maryanne Mcfarlane  Sent: 10/25/2023 10:33 AM EDT  Subject: Vaccines    Forgot to tell you, Sat,20th 2023 got: flu +, covid , booster,RSV and t dap. Out of pneumonia, ill get it asap. Oh, by the way, I got shingles last year. All at [facility]. Boy, are my arms sore!  Kim

## 2023-11-06 ENCOUNTER — PATIENT MESSAGE (OUTPATIENT)
Dept: FAMILY MEDICINE CLINIC | Age: 68
End: 2023-11-06

## 2023-11-06 NOTE — TELEPHONE ENCOUNTER
From: Sera Weaver  To: Pedro Pérez  Sent: 11/6/2023 10:19 AM EST  Subject: Vitamin D     Hi, I've been reading [facility] information about vitamin d. So, about 10 years ago,my prior ob/gyn suggested I take 8-10 thousand iu of vit d, for my muscle pain. Seemed to work well. I knew it was a lot, she said it wouldn't hurt me. So,I've learned to much can cause hypercalcemia and possibly kidney stones. But, it can also keep calcium from getting to my bones! True? So, you know what I'm about to ask lol. Vit d level and blood calcium level to go with my blood work. I'm done, I will call and schedule tomorrow.  Thanks so much, Kim

## 2023-11-07 DIAGNOSIS — F41.1 GAD (GENERALIZED ANXIETY DISORDER): ICD-10-CM

## 2023-11-08 RX ORDER — LORAZEPAM 1 MG/1
TABLET ORAL
Qty: 120 TABLET | Refills: 0 | Status: SHIPPED | OUTPATIENT
Start: 2023-11-08 | End: 2023-12-08

## 2023-11-14 ENCOUNTER — NURSE ONLY (OUTPATIENT)
Dept: FAMILY MEDICINE CLINIC | Age: 68
End: 2023-11-14

## 2023-11-14 DIAGNOSIS — I10 ESSENTIAL HYPERTENSION: ICD-10-CM

## 2023-11-14 DIAGNOSIS — E78.2 MIXED HYPERLIPIDEMIA: ICD-10-CM

## 2023-11-14 DIAGNOSIS — R23.2 HOT FLASHES: ICD-10-CM

## 2023-11-14 LAB
ALBUMIN SERPL-MCNC: 4.6 G/DL (ref 3.4–5)
ALBUMIN/GLOB SERPL: 2.3 {RATIO} (ref 1.1–2.2)
ALP SERPL-CCNC: 159 U/L (ref 40–129)
ALT SERPL-CCNC: 20 U/L (ref 10–40)
ANION GAP SERPL CALCULATED.3IONS-SCNC: 11 MMOL/L (ref 3–16)
AST SERPL-CCNC: 15 U/L (ref 15–37)
BILIRUB SERPL-MCNC: <0.2 MG/DL (ref 0–1)
BUN SERPL-MCNC: 9 MG/DL (ref 7–20)
CALCIUM SERPL-MCNC: 9.5 MG/DL (ref 8.3–10.6)
CHLORIDE SERPL-SCNC: 92 MMOL/L (ref 99–110)
CHOLEST SERPL-MCNC: 220 MG/DL (ref 0–199)
CO2 SERPL-SCNC: 31 MMOL/L (ref 21–32)
CREAT SERPL-MCNC: 0.6 MG/DL (ref 0.6–1.2)
GFR SERPLBLD CREATININE-BSD FMLA CKD-EPI: >60 ML/MIN/{1.73_M2}
GLUCOSE SERPL-MCNC: 88 MG/DL (ref 70–99)
HDLC SERPL-MCNC: 63 MG/DL (ref 40–60)
LDLC SERPL CALC-MCNC: 131 MG/DL
POTASSIUM SERPL-SCNC: 4.2 MMOL/L (ref 3.5–5.1)
PROT SERPL-MCNC: 6.6 G/DL (ref 6.4–8.2)
PTH-INTACT SERPL-MCNC: 20.5 PG/ML (ref 14–72)
SODIUM SERPL-SCNC: 134 MMOL/L (ref 136–145)
TRIGL SERPL-MCNC: 130 MG/DL (ref 0–150)
VLDLC SERPL CALC-MCNC: 26 MG/DL

## 2023-11-20 ENCOUNTER — E-VISIT (OUTPATIENT)
Dept: FAMILY MEDICINE CLINIC | Age: 68
End: 2023-11-20

## 2023-11-20 DIAGNOSIS — B96.89 ACUTE BACTERIAL SINUSITIS: Primary | ICD-10-CM

## 2023-11-20 DIAGNOSIS — J01.90 ACUTE BACTERIAL SINUSITIS: Primary | ICD-10-CM

## 2023-11-21 DIAGNOSIS — F41.8 DEPRESSION WITH ANXIETY: ICD-10-CM

## 2023-11-21 RX ORDER — VENLAFAXINE HYDROCHLORIDE 75 MG/1
75 CAPSULE, EXTENDED RELEASE ORAL DAILY
Qty: 90 CAPSULE | Refills: 3 | Status: CANCELLED | OUTPATIENT
Start: 2023-11-21

## 2023-11-21 RX ORDER — VENLAFAXINE HYDROCHLORIDE 75 MG/1
75 CAPSULE, EXTENDED RELEASE ORAL DAILY
Qty: 90 CAPSULE | Refills: 0 | Status: SHIPPED | OUTPATIENT
Start: 2023-11-21

## 2023-11-21 ASSESSMENT — LIFESTYLE VARIABLES
SMOKING_STATUS: NO, I'M A FORMER SMOKER
SMOKING_YEARS: 40
PACKS_PER_DAY: 1

## 2023-12-05 NOTE — PATIENT INSTRUCTIONS
You may receive a survey regarding the care you received during your visit. Your input is valuable to us. We encourage you to complete and return your survey. We hope you will choose us in the future for your healthcare needs. GENERAL OFFICE POLICIES      Telephone Calls: Messages will be answered within 1-2 business days, unless the provider is out of the office. If it is urgent a covering provider will answer. (this does not include Medication refills). MyChart: We recommend all patients sign up for [employer]. Through this portal you can see your lab results, request refills, schedule appointments, pay your bill and send messages to the office. [employer] messages will be answered within 1-2 business days unless the provider is out of the office. For urgent matters, please call the office. Appointments:  All appointments must be scheduled. We ask all patients to schedule their next follow up appointment before they leave the office to make sure you will be able to be seen before you run out of medications. 24 hours notice is required to cancel or reschedule an appointment to avoid being marked as a no show. You may be dismissed from the practice after 3 no shows. LATE for Appointment: If you are 15 or more minutes late for your appointment, you may be asked to reschedule. MA/LAB APPTS: Must be scheduled, cannot accept walk in lab visits. We only draw labs for patients established in our office. We only do injections for medications ordered by our office. Acute Sick Visits:  Nothing other than acute complaint will be addressed at this visit. TRADITIONAL MEDICARE  DOES NOT COVER PHYSICALS  MEDICARE WELLNESS VISITS: These are NOT physicals but the free annual visit offered by Medicare to discuss wellness issues. Medication refills, checkups, etc. will not be addressed during this visit.   Medication Refills: Refills are handled electronically so please contact your pharmacy for medication refills

## 2023-12-06 DIAGNOSIS — F41.1 GAD (GENERALIZED ANXIETY DISORDER): ICD-10-CM

## 2023-12-06 RX ORDER — LORAZEPAM 1 MG/1
TABLET ORAL
Qty: 120 TABLET | OUTPATIENT
Start: 2023-12-06

## 2023-12-07 ENCOUNTER — OFFICE VISIT (OUTPATIENT)
Dept: FAMILY MEDICINE CLINIC | Age: 68
End: 2023-12-07

## 2023-12-07 VITALS
DIASTOLIC BLOOD PRESSURE: 68 MMHG | HEART RATE: 80 BPM | HEIGHT: 68 IN | BODY MASS INDEX: 26.67 KG/M2 | SYSTOLIC BLOOD PRESSURE: 130 MMHG | OXYGEN SATURATION: 99 % | WEIGHT: 176 LBS

## 2023-12-07 DIAGNOSIS — Z79.899 LONG-TERM USE OF HIGH-RISK MEDICATION: ICD-10-CM

## 2023-12-07 DIAGNOSIS — F41.1 GAD (GENERALIZED ANXIETY DISORDER): Primary | ICD-10-CM

## 2023-12-07 LAB
ALCOHOL URINE: ABNORMAL
AMPHETAMINE SCREEN, URINE: ABNORMAL
BARBITURATE SCREEN, URINE: ABNORMAL
BENZODIAZEPINE SCREEN, URINE: ABNORMAL
BUPRENORPHINE URINE: ABNORMAL
COCAINE METABOLITE SCREEN URINE: ABNORMAL
FENTANYL SCREEN, URINE: ABNORMAL
GABAPENTIN SCREEN, URINE: ABNORMAL
MDMA URINE: ABNORMAL
METHADONE SCREEN, URINE: ABNORMAL
METHAMPHETAMINE, URINE: ABNORMAL
OPIATE SCREEN URINE: ABNORMAL
OXYCODONE SCREEN URINE: ABNORMAL
PHENCYCLIDINE SCREEN URINE: ABNORMAL
PROPOXYPHENE SCREEN, URINE: ABNORMAL
SYNTHETIC CANNABINOIDS(K2) SCREEN, URINE: ABNORMAL
THC SCREEN, URINE: ABNORMAL
TRAMADOL SCREEN URINE: ABNORMAL
TRICYCLIC ANTIDEPRESSANTS, UR: ABNORMAL

## 2023-12-07 RX ORDER — LORAZEPAM 1 MG/1
TABLET ORAL
Qty: 120 TABLET | Refills: 2 | Status: SHIPPED | OUTPATIENT
Start: 2023-12-07 | End: 2024-01-06

## 2023-12-07 ASSESSMENT — ENCOUNTER SYMPTOMS
COUGH: 0
WHEEZING: 0
BACK PAIN: 1
ABDOMINAL DISTENTION: 0
CHEST TIGHTNESS: 0
NAUSEA: 1
ABDOMINAL PAIN: 1
PHOTOPHOBIA: 0
DIARRHEA: 1
CONSTIPATION: 1
COLOR CHANGE: 0
SHORTNESS OF BREATH: 0

## 2023-12-07 NOTE — PROGRESS NOTES
An electronic signature was used to authenticate this note. --GRIS Toth - CNP       Please note that this chart was generated using dragon dictation software. Although every effort was made to ensure the accuracy of this automated transcription, some errors in transcription may have occurred.

## 2023-12-13 ENCOUNTER — HOSPITAL ENCOUNTER (OUTPATIENT)
Dept: WOMENS IMAGING | Age: 68
Discharge: HOME | End: 2023-12-13
Payer: MEDICARE

## 2023-12-13 ENCOUNTER — OFFICE VISIT (OUTPATIENT)
Dept: BREAST CENTER | Age: 68
End: 2023-12-13
Payer: MEDICARE

## 2023-12-13 VITALS
BODY MASS INDEX: 26.91 KG/M2 | DIASTOLIC BLOOD PRESSURE: 69 MMHG | SYSTOLIC BLOOD PRESSURE: 136 MMHG | HEART RATE: 80 BPM | WEIGHT: 177 LBS

## 2023-12-13 VITALS — BODY MASS INDEX: 26.22 KG/M2 | WEIGHT: 173 LBS | HEIGHT: 68 IN

## 2023-12-13 DIAGNOSIS — D05.12 NEOPLASM OF LEFT BREAST, PRIMARY TUMOR STAGING CATEGORY TIS: DUCTAL CARCINOMA IN SITU (DCIS): ICD-10-CM

## 2023-12-13 DIAGNOSIS — D05.12 NEOPLASM OF LEFT BREAST, PRIMARY TUMOR STAGING CATEGORY TIS: DUCTAL CARCINOMA IN SITU (DCIS): Primary | ICD-10-CM

## 2023-12-13 DIAGNOSIS — Z90.13 S/P PARTIAL MASTECTOMY, BILATERAL: ICD-10-CM

## 2023-12-13 DIAGNOSIS — Z17.0 MALIGNANT NEOPLASM OF UPPER-INNER QUADRANT OF RIGHT BREAST IN FEMALE, ESTROGEN RECEPTOR POSITIVE (HCC): ICD-10-CM

## 2023-12-13 DIAGNOSIS — Z12.31 VISIT FOR SCREENING MAMMOGRAM: ICD-10-CM

## 2023-12-13 DIAGNOSIS — R92.333 HETEROGENEOUSLY DENSE TISSUE OF BOTH BREASTS ON MAMMOGRAPHY: ICD-10-CM

## 2023-12-13 DIAGNOSIS — C50.211 MALIGNANT NEOPLASM OF UPPER-INNER QUADRANT OF RIGHT BREAST IN FEMALE, ESTROGEN RECEPTOR POSITIVE (HCC): ICD-10-CM

## 2023-12-13 DIAGNOSIS — C50.911 DUCTAL CARCINOMA OF RIGHT BREAST (HCC): ICD-10-CM

## 2023-12-13 PROCEDURE — G0279 TOMOSYNTHESIS, MAMMO: HCPCS

## 2023-12-13 PROCEDURE — 3078F DIAST BP <80 MM HG: CPT | Performed by: SURGERY

## 2023-12-13 PROCEDURE — 3075F SYST BP GE 130 - 139MM HG: CPT | Performed by: SURGERY

## 2023-12-13 PROCEDURE — 3017F COLORECTAL CA SCREEN DOC REV: CPT | Performed by: SURGERY

## 2023-12-13 PROCEDURE — G8427 DOCREV CUR MEDS BY ELIG CLIN: HCPCS | Performed by: SURGERY

## 2023-12-13 PROCEDURE — G8417 CALC BMI ABV UP PARAM F/U: HCPCS | Performed by: SURGERY

## 2023-12-13 PROCEDURE — G8399 PT W/DXA RESULTS DOCUMENT: HCPCS | Performed by: SURGERY

## 2023-12-13 PROCEDURE — 99213 OFFICE O/P EST LOW 20 MIN: CPT | Performed by: SURGERY

## 2023-12-13 PROCEDURE — 1090F PRES/ABSN URINE INCON ASSESS: CPT | Performed by: SURGERY

## 2023-12-13 PROCEDURE — 1123F ACP DISCUSS/DSCN MKR DOCD: CPT | Performed by: SURGERY

## 2023-12-13 PROCEDURE — G9899 SCRN MAM PERF RSLTS DOC: HCPCS | Performed by: SURGERY

## 2023-12-13 PROCEDURE — 4004F PT TOBACCO SCREEN RCVD TLK: CPT | Performed by: SURGERY

## 2023-12-13 PROCEDURE — G8484 FLU IMMUNIZE NO ADMIN: HCPCS | Performed by: SURGERY

## 2023-12-13 NOTE — PATIENT INSTRUCTIONS
Mammogram reviewed, no concerning findings  Breast exam performed, no palpable masses. Continue self breast exams    Healthy Lifestyle Recommendations: healthy diet (decrease consumption of red meat, increase fresh fruits and vegetables), decreased alcohol consumption (less than 4 drinks/week), adequate sleep (goal 6-8 hours), routine exercise (goal 150 minutes/week or greater), weight control.      Return:6 month MRI around 12/14/24 mammogram and breast exam

## 2023-12-13 NOTE — PROGRESS NOTES
12/13/2023    Chief Complaint   Patient presents with    1 Year Follow Up     Breast exam        HPI Patient is s/p right lumpectomy/snbx 6/2022 for T1aN0 ductal carcinoma, ER pos, [place] neg, Her2 neg, final path showed no residual invasive carcinoma. 12 mm area of LCIS noted. 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 Postop bilateral accelerated radiation, finished 8/2022. She opted not to take an AI. Patient has not felt any breast masses, no breast pain or nipple discharge. Bilateral diagnostic mammogram today BIRADS 2C      Current Outpatient Medications:     LORazepam (ATIVAN) 1 MG tablet, TAKE 1 TABLET BY MOUTH FOUR TIMES DAILY AS NEEDED FOR ANXIETY. NO MORE THAN 4 TABLET TOTAL PER DAY, Disp: 120 tablet, Rfl: 2    venlafaxine (EFFEXOR XR) 75 MG extended release capsule, Take 1 capsule by mouth daily, Disp: 90 capsule, Rfl: 0    fluticasone (FLONASE) 50 MCG/ACT nasal spray, SHAKE WELL AND USE 2 SPRAYS IN EACH NOSTRIL EVERY DAY, Disp: 48 g, Rfl: 0    busPIRone (BUSPAR) 15 MG tablet, Take 15 mg by mouth 2 times daily, Disp: 180 tablet, Rfl: 0    tiZANidine (ZANAFLEX) 4 MG tablet, TAKE 1 TABLET BY MOUTH EVERY 8 HOURS AS NEEDED FOR MUSCLE CRAMPS OR PAIN Strength: 4 mg, Disp: 270 tablet, Rfl: 0    hydrOXYzine HCl (ATARAX) 25 MG tablet, TAKE 1 TO 2 TABLETS BY MOUTH EVERY 6 HOURS AS NEEDED FOR ITCHING OR ANXIETY OR INSOMNIA Strength: 25 mg, Disp: 120 tablet, Rfl: 0    ibuprofen (ADVIL;MOTRIN) 400 MG tablet, Take 1 tablet by mouth every 6 hours as needed for Pain, Disp: 360 tablet, Rfl: 0    dicyclomine (BENTYL) 20 MG tablet, Take 1 tablet by mouth every 6 hours as needed (AS NEEDED FOR ABD PAIN), Disp: 360 tablet, Rfl: 0    ezetimibe (ZETIA) 10 MG tablet, Take 1 tablet by mouth daily, Disp: 90 tablet, Rfl: 0    amphetamine-dextroamphetamine (ADDERALL) 15 MG tablet, Take 1 tablet by mouth 2 times daily. , Disp: , Rfl:     atenolol (TENORMIN) 50 MG tablet, Take 1

## 2023-12-25 DIAGNOSIS — E78.2 MIXED HYPERLIPIDEMIA: ICD-10-CM

## 2023-12-26 RX ORDER — EZETIMIBE 10 MG/1
10 TABLET ORAL DAILY
Qty: 90 TABLET | Refills: 0 | Status: SHIPPED | OUTPATIENT
Start: 2023-12-26

## 2024-01-07 ENCOUNTER — PATIENT MESSAGE (OUTPATIENT)
Dept: FAMILY MEDICINE CLINIC | Age: 69
End: 2024-01-07

## 2024-01-07 DIAGNOSIS — J30.2 SEASONAL ALLERGIES: ICD-10-CM

## 2024-01-07 DIAGNOSIS — I10 ESSENTIAL HYPERTENSION: ICD-10-CM

## 2024-01-07 DIAGNOSIS — R10.9 ABDOMINAL CRAMPING: ICD-10-CM

## 2024-01-08 DIAGNOSIS — F41.1 GAD (GENERALIZED ANXIETY DISORDER): ICD-10-CM

## 2024-01-08 RX ORDER — DICYCLOMINE HCL 20 MG
20 TABLET ORAL EVERY 6 HOURS PRN
Qty: 360 TABLET | Refills: 1 | Status: SHIPPED | OUTPATIENT
Start: 2024-01-08

## 2024-01-08 RX ORDER — OMEPRAZOLE 40 MG/1
CAPSULE, DELAYED RELEASE ORAL
Qty: 180 CAPSULE | Refills: 3 | Status: SHIPPED | OUTPATIENT
Start: 2024-01-08

## 2024-01-08 RX ORDER — TRIAMTERENE AND HYDROCHLOROTHIAZIDE 37.5; 25 MG/1; MG/1
1 TABLET ORAL DAILY
Qty: 90 TABLET | Refills: 1 | Status: SHIPPED | OUTPATIENT
Start: 2024-01-08

## 2024-01-08 RX ORDER — BUSPIRONE HYDROCHLORIDE 15 MG/1
15 TABLET ORAL 2 TIMES DAILY
Qty: 180 TABLET | Refills: 0 | Status: SHIPPED | OUTPATIENT
Start: 2024-01-08

## 2024-01-08 RX ORDER — FLUTICASONE PROPIONATE 50 MCG
SPRAY, SUSPENSION (ML) NASAL
Qty: 48 G | Refills: 1 | Status: SHIPPED | OUTPATIENT
Start: 2024-01-08

## 2024-01-08 RX ORDER — IBUPROFEN 400 MG/1
400 TABLET ORAL EVERY 6 HOURS PRN
Qty: 360 TABLET | Refills: 1 | Status: SHIPPED | OUTPATIENT
Start: 2024-01-08

## 2024-01-08 NOTE — TELEPHONE ENCOUNTER
From: Sera Weaver  To: Luke A Glischinski  Sent: 1/7/2024 2:12 PM EST  Subject: Prilosec    Need prilosec refilled, too, 2x per day. Also, changed pharmacy to [facility] cause [facility] are losing their mind! Thank you!oh, I also got RSV vac. 10/21 at [facility].

## 2024-02-08 ENCOUNTER — PATIENT MESSAGE (OUTPATIENT)
Dept: FAMILY MEDICINE CLINIC | Age: 69
End: 2024-02-08

## 2024-02-08 DIAGNOSIS — F41.1 GAD (GENERALIZED ANXIETY DISORDER): ICD-10-CM

## 2024-02-08 RX ORDER — LORAZEPAM 1 MG/1
TABLET ORAL
Qty: 120 TABLET | Refills: 2 | Status: SHIPPED | OUTPATIENT
Start: 2024-02-08 | End: 2024-03-09

## 2024-02-08 NOTE — TELEPHONE ENCOUNTER
From: Sera Weaver  To: Luke A Glischinski  Sent: 2/8/2024 1:04 PM EST  Subject: Ativan    Hi, sorry to bother you. I switched to [facility] pharmacy. I had one more refill at [facility] for ativan. Its filled sitting there, but, the price went up 20.00. Can you send the ativan to [facility] for me? I will tell [facility] to put it back. [facility] to [facility] to [facility], but because its scheduled, they couldnt. Its due on the 10th. Thanks! Kim

## 2024-02-23 NOTE — PROGRESS NOTES
 KEVIN (generalized anxiety disorder) Yes    Encounter for screening mammogram for breast cancer     Hiatal hernia     Gastroesophageal reflux disease without esophagitis     Essential hypertension     Sinus tachycardia        Hiatal hernia  Moderately large paraesophageal hiatal hernia with symptoms. May be causing her palpitations. Noted on EGD. She may get referred to a surgeon. Gastroesophageal reflux disease without esophagitis  see hiatal hernia    KEVIN (generalized anxiety disorder)  OARRS report done. Refill Ativan. Essential hypertension  BP okay. Taking one half clonidine tablet at bedtimemaybe help with her sleep as well. Sinus tachycardia  Notices fast heart rate after she eats. Has a large hiatal hernia. May be playing some role. PLAN:  See ASSESSMENT for evaluation & PLAN     Orders Placed This Encounter   Procedures    Mammography Screening     Scheduling Instructions: To schedule your mammography within one of our [facility] ([address], [facility], [facility]), please call [phone])            To schedule through our [email or web address] at a location near you, please call [phone]     Order Specific Question:   Other (Specify in Free Text)     Answer:   Screening bilateral mammogram with additional diagnostic mammogram and/or ultrasound as recommended by the radiologist       [address], [address], [address] and  reviewed and noted. Recent and past labs, tests and consults also reviewed. Recent or new meds also reviewed. no

## 2024-03-04 DIAGNOSIS — F41.8 DEPRESSION WITH ANXIETY: ICD-10-CM

## 2024-03-04 RX ORDER — VENLAFAXINE HYDROCHLORIDE 75 MG/1
75 CAPSULE, EXTENDED RELEASE ORAL DAILY
Qty: 90 CAPSULE | Refills: 0 | Status: SHIPPED | OUTPATIENT
Start: 2024-03-04

## 2024-04-04 NOTE — PATIENT INSTRUCTIONS
to contact your doctor if you have any problems.  Where can you learn more?  Go to [email or web address] and enter F075 to learn more about \"A Healthy Heart: Care Instructions.\"  Current as of: June 24, 2023               Content Version: 14.0  © 4098-0049 [employer]   Care instructions adapted under license by [employer]. If you have questions about a medical condition or this instruction, always ask your healthcare professional. [employer] disclaims any warranty or liability for your use of this information.      Personalized Preventive Plan for Sera Weaver - 4/5/2024  Medicare offers a range of preventive health benefits. Some of the tests and screenings are paid in full while other may be subject to a deductible, co-insurance, and/or copay.    Some of these benefits include a comprehensive review of your medical history including lifestyle, illnesses that may run in your family, and various assessments and screenings as appropriate.    After reviewing your medical record and screening and assessments performed today your provider may have ordered immunizations, labs, imaging, and/or referrals for you.  A list of these orders (if applicable) as well as your Preventive Care list are included within your After Visit Summary for your review.    Other Preventive Recommendations:    A preventive eye exam performed by an eye specialist is recommended every 1-2 years to screen for glaucoma; cataracts, macular degeneration, and other eye disorders.  A preventive dental visit is recommended every 6 months.  Try to get at least 150 minutes of exercise per week or 10,000 steps per day on a pedometer .  Order or download the FREE \"Exercise & Physical Activity: Your Everyday Guide\" from [employer]. Call 1-397.924.1489 or search [employer] online.  You need 4188-3517 mg of calcium and 2164-7607 IU of vitamin D per day. It is

## 2024-04-05 ENCOUNTER — OFFICE VISIT (OUTPATIENT)
Dept: FAMILY MEDICINE CLINIC | Age: 69
End: 2024-04-05
Payer: MEDICARE

## 2024-04-05 VITALS
WEIGHT: 181 LBS | DIASTOLIC BLOOD PRESSURE: 80 MMHG | RESPIRATION RATE: 18 BRPM | SYSTOLIC BLOOD PRESSURE: 132 MMHG | HEIGHT: 68 IN | HEART RATE: 78 BPM | OXYGEN SATURATION: 99 % | BODY MASS INDEX: 27.43 KG/M2

## 2024-04-05 DIAGNOSIS — Z12.11 SCREENING FOR COLON CANCER: ICD-10-CM

## 2024-04-05 DIAGNOSIS — Z17.0 MALIGNANT NEOPLASM OF UPPER-INNER QUADRANT OF RIGHT BREAST IN FEMALE, ESTROGEN RECEPTOR POSITIVE (HCC): ICD-10-CM

## 2024-04-05 DIAGNOSIS — E78.2 MIXED HYPERLIPIDEMIA: ICD-10-CM

## 2024-04-05 DIAGNOSIS — C50.211 MALIGNANT NEOPLASM OF UPPER-INNER QUADRANT OF RIGHT BREAST IN FEMALE, ESTROGEN RECEPTOR POSITIVE (HCC): ICD-10-CM

## 2024-04-05 DIAGNOSIS — I73.81 ERYTHROMELALGIA (HCC): ICD-10-CM

## 2024-04-05 DIAGNOSIS — F41.1 GAD (GENERALIZED ANXIETY DISORDER): ICD-10-CM

## 2024-04-05 DIAGNOSIS — Z00.00 MEDICARE ANNUAL WELLNESS VISIT, SUBSEQUENT: Primary | ICD-10-CM

## 2024-04-05 DIAGNOSIS — R10.9 ABDOMINAL CRAMPING: ICD-10-CM

## 2024-04-05 PROCEDURE — G8417 CALC BMI ABV UP PARAM F/U: HCPCS

## 2024-04-05 PROCEDURE — 1123F ACP DISCUSS/DSCN MKR DOCD: CPT

## 2024-04-05 PROCEDURE — G8399 PT W/DXA RESULTS DOCUMENT: HCPCS

## 2024-04-05 PROCEDURE — 3017F COLORECTAL CA SCREEN DOC REV: CPT

## 2024-04-05 PROCEDURE — G0439 PPPS, SUBSEQ VISIT: HCPCS

## 2024-04-05 PROCEDURE — 99214 OFFICE O/P EST MOD 30 MIN: CPT

## 2024-04-05 PROCEDURE — 3079F DIAST BP 80-89 MM HG: CPT

## 2024-04-05 PROCEDURE — 1090F PRES/ABSN URINE INCON ASSESS: CPT

## 2024-04-05 PROCEDURE — 1036F TOBACCO NON-USER: CPT

## 2024-04-05 PROCEDURE — G8427 DOCREV CUR MEDS BY ELIG CLIN: HCPCS

## 2024-04-05 PROCEDURE — 3075F SYST BP GE 130 - 139MM HG: CPT

## 2024-04-05 RX ORDER — LORAZEPAM 1 MG/1
TABLET ORAL
Qty: 120 TABLET | Refills: 2 | Status: SHIPPED | OUTPATIENT
Start: 2024-04-14 | End: 2024-07-13

## 2024-04-05 RX ORDER — DICYCLOMINE HCL 20 MG
20 TABLET ORAL EVERY 6 HOURS PRN
Qty: 360 TABLET | Refills: 1 | Status: SHIPPED | OUTPATIENT
Start: 2024-04-05

## 2024-04-05 RX ORDER — EZETIMIBE 10 MG/1
10 TABLET ORAL DAILY
Qty: 90 TABLET | Refills: 0 | Status: SHIPPED | OUTPATIENT
Start: 2024-04-05

## 2024-04-05 SDOH — ECONOMIC STABILITY: FOOD INSECURITY: WITHIN THE PAST 12 MONTHS, THE FOOD YOU BOUGHT JUST DIDN'T LAST AND YOU DIDN'T HAVE MONEY TO GET MORE.: NEVER TRUE

## 2024-04-05 SDOH — ECONOMIC STABILITY: INCOME INSECURITY: HOW HARD IS IT FOR YOU TO PAY FOR THE VERY BASICS LIKE FOOD, HOUSING, MEDICAL CARE, AND HEATING?: NOT HARD AT ALL

## 2024-04-05 SDOH — ECONOMIC STABILITY: FOOD INSECURITY: WITHIN THE PAST 12 MONTHS, YOU WORRIED THAT YOUR FOOD WOULD RUN OUT BEFORE YOU GOT MONEY TO BUY MORE.: NEVER TRUE

## 2024-04-05 ASSESSMENT — PATIENT HEALTH QUESTIONNAIRE - PHQ9
2. FEELING DOWN, DEPRESSED OR HOPELESS: SEVERAL DAYS
SUM OF ALL RESPONSES TO PHQ9 QUESTIONS 1 & 2: 2
SUM OF ALL RESPONSES TO PHQ QUESTIONS 1-9: 5
8. MOVING OR SPEAKING SO SLOWLY THAT OTHER PEOPLE COULD HAVE NOTICED. OR THE OPPOSITE, BEING SO FIGETY OR RESTLESS THAT YOU HAVE BEEN MOVING AROUND A LOT MORE THAN USUAL: NOT AT ALL
10. IF YOU CHECKED OFF ANY PROBLEMS, HOW DIFFICULT HAVE THESE PROBLEMS MADE IT FOR YOU TO DO YOUR WORK, TAKE CARE OF THINGS AT HOME, OR GET ALONG WITH OTHER PEOPLE: SOMEWHAT DIFFICULT
6. FEELING BAD ABOUT YOURSELF - OR THAT YOU ARE A FAILURE OR HAVE LET YOURSELF OR YOUR FAMILY DOWN: NOT AT ALL
SUM OF ALL RESPONSES TO PHQ QUESTIONS 1-9: 5
1. LITTLE INTEREST OR PLEASURE IN DOING THINGS: SEVERAL DAYS
7. TROUBLE CONCENTRATING ON THINGS, SUCH AS READING THE NEWSPAPER OR WATCHING TELEVISION: SEVERAL DAYS
9. THOUGHTS THAT YOU WOULD BE BETTER OFF DEAD, OR OF HURTING YOURSELF: NOT AT ALL
5. POOR APPETITE OR OVEREATING: NOT AT ALL
3. TROUBLE FALLING OR STAYING ASLEEP: SEVERAL DAYS

## 2024-04-05 NOTE — PROGRESS NOTES
Medicare Annual Wellness Visit    Sera Weaver is here for Medicare AWV (MEDICARE AWV )    Assessment & Plan   Medicare annual wellness visit, subsequent  -Personal medical history, surgical history, family history reviewed.  Medications reconciled.  -Care gaps addressed.  Agreeable to screenings, declines vaccinations today.  -AWV-appropriate healthcare topics discussed.  -Educated on office policies and procedures.  -Follow-up in 3 months for controlled medications.  KEVIN (generalized anxiety disorder)  -Well-controlled on lorazepam 3-4 times daily as needed.  -Med contract and UDS up-to-date as of November 2023.  -PDMP/OARRS reviewed.  -Refill sent to pharmacy.  -Continue following with psychiatry.  -Follow-up in 3 months.  -     LORazepam (ATIVAN) 1 MG tablet; TAKE 1 TABLET BY MOUTH FOUR TIMES DAILY AS NEEDED FOR ANXIETY. NO MORE THAN 4 TABLET TOTAL PER DAY, Disp-120 tablet, R-2Normal  Abdominal cramping  -Chronic IBS, managing with Bentyl.  -Refill sent to the pharmacy.  -Recommend avoiding triggers.  -Follow-up as needed.  -     dicyclomine (BENTYL) 20 MG tablet; Take 1 tablet by mouth every 6 hours as needed (AS NEEDED FOR ABD PAIN), Disp-360 tablet, R-1Normal  Malignant neoplasm of upper-inner quadrant of right breast in female, estrogen receptor positive (HCC)  -Doing okay at this time  -Continue following with breast specialist.  Erythromelalgia (HCC)  -Uncomplicated at this time  -Follow-up annually.  Screening for colon cancer  -     Fecal DNA Colorectal cancer screening (Cologuard)    Recommendations for Preventive Services Due: see orders and patient instructions/AVS.  Recommended screening schedule for the next 5-10 years is provided to the patient in written form: see Patient Instructions/AVS.     Return in about 3 months (around 7/5/2024) for Mental Health Follow-up, Controlled Medications.     Subjective   The following acute and/or chronic problems were also addressed today:  She has been

## 2024-04-07 DIAGNOSIS — F41.1 GAD (GENERALIZED ANXIETY DISORDER): ICD-10-CM

## 2024-04-08 RX ORDER — BUSPIRONE HYDROCHLORIDE 15 MG/1
15 TABLET ORAL 2 TIMES DAILY
Qty: 180 TABLET | Refills: 0 | Status: SHIPPED | OUTPATIENT
Start: 2024-04-08

## 2024-04-10 ENCOUNTER — PATIENT MESSAGE (OUTPATIENT)
Dept: FAMILY MEDICINE CLINIC | Age: 69
End: 2024-04-10

## 2024-04-10 DIAGNOSIS — E78.2 MIXED HYPERLIPIDEMIA: ICD-10-CM

## 2024-04-10 DIAGNOSIS — F41.8 DEPRESSION WITH ANXIETY: ICD-10-CM

## 2024-04-10 RX ORDER — VENLAFAXINE HYDROCHLORIDE 75 MG/1
75 CAPSULE, EXTENDED RELEASE ORAL DAILY
Qty: 90 CAPSULE | Refills: 0 | Status: SHIPPED | OUTPATIENT
Start: 2024-04-10

## 2024-04-10 RX ORDER — EZETIMIBE 10 MG/1
10 TABLET ORAL DAILY
Qty: 90 TABLET | Refills: 0 | Status: CANCELLED | OUTPATIENT
Start: 2024-04-10

## 2024-04-10 NOTE — TELEPHONE ENCOUNTER
From: Sera Weaver  To: Luke A Glischinski  Sent: 4/10/2024 12:26 PM EDT  Subject: Medications    Hi, can someone check my refills and make sure ALL meds go to [facility]? I called [facility] few months back, asked them not to put effexor 37.5 on profile but DO put the 75xr. They got it backwards. Zetia went to [facility] this time. Please recall to [facility], costs less. I had changed all meds on my profile to go to [facility]. When I just went there, it was back to [facility]. I changed it again, maybe I did it wrong. Thanks so much...Kim

## 2024-04-15 ENCOUNTER — TELEPHONE (OUTPATIENT)
Dept: ADMINISTRATIVE | Age: 69
End: 2024-04-15

## 2024-04-15 NOTE — TELEPHONE ENCOUNTER
Submitted PA for LORazepam 1MG tablets   [facility]ia [facility]: [id number]  STATUS: PENDING.    Follow up done daily; if no decision with in three days we will refax.  If another three days goes by with no decision will call the insurance for status.

## 2024-04-16 NOTE — TELEPHONE ENCOUNTER
Outcome   Additional Information Required  Authorization already on file for this request. Authorization starting on 01/01/2024 and ending on 12/31/2024.    If this requires a response please respond to the pool ( P MHCX PSC MEDICATION PRE-AUTH).      Thank you please advise patient.

## 2024-05-02 LAB — NONINV COLON CA DNA+OCC BLD SCRN STL QL: NEGATIVE

## 2024-05-03 DIAGNOSIS — I10 ESSENTIAL HYPERTENSION: ICD-10-CM

## 2024-05-03 RX ORDER — CLONIDINE HYDROCHLORIDE 0.1 MG/1
TABLET ORAL
Qty: 270 TABLET | Refills: 1 | Status: SHIPPED | OUTPATIENT
Start: 2024-05-03

## 2024-05-07 DIAGNOSIS — E78.2 MIXED HYPERLIPIDEMIA: ICD-10-CM

## 2024-05-07 RX ORDER — EZETIMIBE 10 MG/1
10 TABLET ORAL DAILY
Qty: 90 TABLET | Refills: 0 | Status: SHIPPED | OUTPATIENT
Start: 2024-05-07

## 2024-06-17 ENCOUNTER — TELEPHONE (OUTPATIENT)
Dept: BREAST CENTER | Age: 69
End: 2024-06-17

## 2024-06-17 DIAGNOSIS — Z12.39 ENCOUNTER FOR BREAST CANCER SCREENING USING NON-MAMMOGRAM MODALITY: ICD-10-CM

## 2024-06-17 DIAGNOSIS — Z90.13 S/P PARTIAL MASTECTOMY, BILATERAL: ICD-10-CM

## 2024-06-17 DIAGNOSIS — Z85.3 PERSONAL HISTORY OF MALIGNANT NEOPLASM OF BREAST: ICD-10-CM

## 2024-06-17 DIAGNOSIS — R92.333 HETEROGENEOUSLY DENSE TISSUE OF BOTH BREASTS ON MAMMOGRAPHY: Primary | ICD-10-CM

## 2024-06-17 NOTE — TELEPHONE ENCOUNTER
Central Scheduling called regarding MRI scheduled for patient. The dx code on the order will not be covered by insurance.

## 2024-07-05 DIAGNOSIS — F41.8 DEPRESSION WITH ANXIETY: ICD-10-CM

## 2024-07-05 RX ORDER — VENLAFAXINE HYDROCHLORIDE 75 MG/1
75 CAPSULE, EXTENDED RELEASE ORAL DAILY
Qty: 90 CAPSULE | Refills: 0 | Status: SHIPPED | OUTPATIENT
Start: 2024-07-05

## 2024-07-05 NOTE — PATIENT INSTRUCTIONS

## 2024-07-08 ENCOUNTER — OFFICE VISIT (OUTPATIENT)
Dept: FAMILY MEDICINE CLINIC | Age: 69
End: 2024-07-08
Payer: MEDICARE

## 2024-07-08 VITALS
HEIGHT: 68 IN | BODY MASS INDEX: 26.98 KG/M2 | SYSTOLIC BLOOD PRESSURE: 124 MMHG | DIASTOLIC BLOOD PRESSURE: 78 MMHG | OXYGEN SATURATION: 96 % | WEIGHT: 178 LBS | HEART RATE: 76 BPM

## 2024-07-08 DIAGNOSIS — F41.8 DEPRESSION WITH ANXIETY: ICD-10-CM

## 2024-07-08 DIAGNOSIS — F41.1 GAD (GENERALIZED ANXIETY DISORDER): Primary | ICD-10-CM

## 2024-07-08 DIAGNOSIS — R10.9 ABDOMINAL CRAMPING: ICD-10-CM

## 2024-07-08 PROCEDURE — 1036F TOBACCO NON-USER: CPT

## 2024-07-08 PROCEDURE — G2211 COMPLEX E/M VISIT ADD ON: HCPCS

## 2024-07-08 PROCEDURE — G8399 PT W/DXA RESULTS DOCUMENT: HCPCS

## 2024-07-08 PROCEDURE — 3074F SYST BP LT 130 MM HG: CPT

## 2024-07-08 PROCEDURE — 99214 OFFICE O/P EST MOD 30 MIN: CPT

## 2024-07-08 PROCEDURE — 3078F DIAST BP <80 MM HG: CPT

## 2024-07-08 PROCEDURE — G8427 DOCREV CUR MEDS BY ELIG CLIN: HCPCS

## 2024-07-08 PROCEDURE — 1123F ACP DISCUSS/DSCN MKR DOCD: CPT

## 2024-07-08 PROCEDURE — 3017F COLORECTAL CA SCREEN DOC REV: CPT

## 2024-07-08 PROCEDURE — 1090F PRES/ABSN URINE INCON ASSESS: CPT

## 2024-07-08 PROCEDURE — G8417 CALC BMI ABV UP PARAM F/U: HCPCS

## 2024-07-08 RX ORDER — LORAZEPAM 1 MG/1
TABLET ORAL
Qty: 120 TABLET | Refills: 2 | Status: SHIPPED | OUTPATIENT
Start: 2024-07-18 | End: 2024-10-16

## 2024-07-08 RX ORDER — VENLAFAXINE HYDROCHLORIDE 75 MG/1
75 CAPSULE, EXTENDED RELEASE ORAL DAILY
Qty: 90 CAPSULE | Refills: 3 | Status: SHIPPED | OUTPATIENT
Start: 2024-07-08

## 2024-07-08 RX ORDER — DICYCLOMINE HCL 20 MG
20 TABLET ORAL EVERY 6 HOURS PRN
Qty: 360 TABLET | Refills: 5 | Status: SHIPPED | OUTPATIENT
Start: 2024-07-08

## 2024-07-08 ASSESSMENT — ENCOUNTER SYMPTOMS
SORE THROAT: 0
RECTAL PAIN: 0
CONSTIPATION: 0
ABDOMINAL DISTENTION: 0
SHORTNESS OF BREATH: 0
VOMITING: 0
BACK PAIN: 0
BLOOD IN STOOL: 0
COUGH: 0
NAUSEA: 0
TROUBLE SWALLOWING: 0
CHEST TIGHTNESS: 0
WHEEZING: 0
COLOR CHANGE: 0
ABDOMINAL PAIN: 1
PHOTOPHOBIA: 0
DIARRHEA: 0
ANAL BLEEDING: 0

## 2024-07-08 NOTE — PROGRESS NOTES
Sera Weaver (:  1955) is a 68 y.o. female,Established patient, here for evaluation of the following chief complaint(s):  Anxiety (Routine follow up for Anxiety ) and Hypertension (Routine Follow up for HTN )      Assessment & Plan   1. KEVIN (generalized anxiety disorder)  -Well-controlled on lorazepam 4 times daily as needed.  -Med contract and UDS up-to-date as of 2023.  -PDMP/OARRS reviewed.  -Refill sent to pharmacy.  -Follow-up in 3 months for med contract and UDS.  -     LORazepam (ATIVAN) 1 MG tablet; TAKE 1 TABLET BY MOUTH FOUR TIMES DAILY AS NEEDED FOR ANXIETY. NO MORE THAN 4 TABLET TOTAL PER DAY, Disp-120 tablet, R-2Normal  2. Depression with anxiety  -Improved control with Effexor daily, Abilify daily.  -Follow-up annually, or sooner as needed.  -     venlafaxine (EFFEXOR XR) 75 MG extended release capsule; Take 1 capsule by mouth daily, Disp-90 capsule, R-3Normal  3. Abdominal cramping  -Improved control with as needed dicyclomine for IBS.  -Follow-up annually, or sooner as needed.  -     dicyclomine (BENTYL) 20 MG tablet; Take 1 tablet by mouth every 6 hours as needed (AS NEEDED FOR ABD PAIN), Disp-360 tablet, R-5Normal      Return in about 3 months (around 10/8/2024) for Controlled Medications, Med contract/UDS due.       Subjective   HPI  She has been doing okay. Her sister in law stayed with her for a year. Her sister in law  on the . Back to her and her  now. A little less stressful with her gone. Sometimes drinks coffee. She has continued Adderall from [employer], no more energy boost. She is getting a breast MRI soon. She has some dental work that needs to be done. Her balance is not as great, wants to see a neurologist. She does not have a family history of MS, but wants to be checked. She has always had poor balance since as a kid. Her back always hurts as well, told she has a pinched nerve in her spine. Injection did help. She has been tired.

## 2024-07-09 ENCOUNTER — HOSPITAL ENCOUNTER (OUTPATIENT)
Dept: MRI IMAGING | Age: 69
Discharge: HOME | End: 2024-07-09
Attending: SURGERY
Payer: MEDICARE

## 2024-07-09 DIAGNOSIS — Z12.39 ENCOUNTER FOR BREAST CANCER SCREENING USING NON-MAMMOGRAM MODALITY: ICD-10-CM

## 2024-07-09 DIAGNOSIS — R92.333 HETEROGENEOUSLY DENSE TISSUE OF BOTH BREASTS ON MAMMOGRAPHY: ICD-10-CM

## 2024-07-09 DIAGNOSIS — Z85.3 PERSONAL HISTORY OF MALIGNANT NEOPLASM OF BREAST: ICD-10-CM

## 2024-07-09 DIAGNOSIS — Z90.13 S/P PARTIAL MASTECTOMY, BILATERAL: ICD-10-CM

## 2024-07-09 LAB
PERFORMED ON: ABNORMAL
POC CREATININE: 0.3 MG/DL (ref 0.6–1.2)
POC SAMPLE TYPE: ABNORMAL

## 2024-07-09 PROCEDURE — C8908 MRI W/O FOL W/CONT, BREAST,: HCPCS

## 2024-07-09 PROCEDURE — 82565 ASSAY OF CREATININE: CPT

## 2024-07-09 PROCEDURE — 6360000004 HC RX CONTRAST MEDICATION: Performed by: SURGERY

## 2024-07-09 PROCEDURE — A9579 GAD-BASE MR CONTRAST NOS,1ML: HCPCS | Performed by: SURGERY

## 2024-07-09 RX ADMIN — GADOTERIDOL 16 ML: 279.3 INJECTION, SOLUTION INTRAVENOUS at 12:22

## 2024-07-31 DIAGNOSIS — J30.2 SEASONAL ALLERGIES: ICD-10-CM

## 2024-07-31 DIAGNOSIS — I10 ESSENTIAL HYPERTENSION: ICD-10-CM

## 2024-07-31 RX ORDER — HYDROXYZINE HYDROCHLORIDE 25 MG/1
TABLET, FILM COATED ORAL
Qty: 120 TABLET | Refills: 0 | Status: SHIPPED | OUTPATIENT
Start: 2024-07-31

## 2024-07-31 RX ORDER — CLONIDINE HYDROCHLORIDE 0.1 MG/1
TABLET ORAL
Qty: 270 TABLET | Refills: 0 | Status: SHIPPED | OUTPATIENT
Start: 2024-07-31

## 2024-08-06 DIAGNOSIS — E78.2 MIXED HYPERLIPIDEMIA: ICD-10-CM

## 2024-08-06 RX ORDER — EZETIMIBE 10 MG/1
10 TABLET ORAL DAILY
Qty: 90 TABLET | Refills: 0 | Status: SHIPPED | OUTPATIENT
Start: 2024-08-06

## 2024-09-16 DIAGNOSIS — I10 ESSENTIAL HYPERTENSION: ICD-10-CM

## 2024-09-16 DIAGNOSIS — M47.816 FACET ARTHROPATHY, LUMBAR: ICD-10-CM

## 2024-09-16 DIAGNOSIS — F41.8 DEPRESSION WITH ANXIETY: ICD-10-CM

## 2024-09-16 RX ORDER — ATENOLOL 50 MG/1
50 TABLET ORAL DAILY
Qty: 90 TABLET | Refills: 0 | Status: SHIPPED | OUTPATIENT
Start: 2024-09-16

## 2024-09-23 ENCOUNTER — TELEPHONE (OUTPATIENT)
Dept: FAMILY MEDICINE CLINIC | Age: 69
End: 2024-09-23

## 2024-09-23 DIAGNOSIS — F41.1 GAD (GENERALIZED ANXIETY DISORDER): ICD-10-CM

## 2024-09-23 RX ORDER — BUSPIRONE HYDROCHLORIDE 15 MG/1
15 TABLET ORAL 2 TIMES DAILY
Qty: 180 TABLET | Refills: 0 | Status: SHIPPED | OUTPATIENT
Start: 2024-09-23

## 2024-10-08 ENCOUNTER — OFFICE VISIT (OUTPATIENT)
Dept: FAMILY MEDICINE CLINIC | Age: 69
End: 2024-10-08
Payer: MEDICARE

## 2024-10-08 VITALS
HEART RATE: 88 BPM | HEIGHT: 68 IN | BODY MASS INDEX: 28.19 KG/M2 | RESPIRATION RATE: 16 BRPM | SYSTOLIC BLOOD PRESSURE: 124 MMHG | DIASTOLIC BLOOD PRESSURE: 72 MMHG | OXYGEN SATURATION: 98 % | WEIGHT: 186 LBS

## 2024-10-08 DIAGNOSIS — I10 ESSENTIAL HYPERTENSION: ICD-10-CM

## 2024-10-08 DIAGNOSIS — Z79.899 HIGH RISK MEDICATION USE: ICD-10-CM

## 2024-10-08 DIAGNOSIS — J30.2 SEASONAL ALLERGIES: ICD-10-CM

## 2024-10-08 DIAGNOSIS — F41.8 DEPRESSION WITH ANXIETY: ICD-10-CM

## 2024-10-08 DIAGNOSIS — F41.1 GAD (GENERALIZED ANXIETY DISORDER): Primary | ICD-10-CM

## 2024-10-08 DIAGNOSIS — M79.10 GENERALIZED MUSCLE ACHE: ICD-10-CM

## 2024-10-08 LAB
ALCOHOL URINE: ABNORMAL
AMPHETAMINE SCREEN URINE: POSITIVE
BARBITURATE SCREEN URINE: ABNORMAL
BENZODIAZEPINE SCREEN, URINE: ABNORMAL
BUPRENORPHINE URINE: ABNORMAL
COCAINE METABOLITE SCREEN URINE: ABNORMAL
FENTANYL SCREEN, URINE: ABNORMAL
GABAPENTIN SCREEN, URINE: ABNORMAL
MDMA, URINE: ABNORMAL
METHADONE SCREEN, URINE: ABNORMAL
METHAMPHETAMINE, URINE: ABNORMAL
OPIATE SCREEN URINE: ABNORMAL
OXYCODONE SCREEN URINE: ABNORMAL
PHENCYCLIDINE SCREEN URINE: ABNORMAL
PROPOXYPHENE SCREEN, URINE: ABNORMAL
SYNTHETIC CANNABINOIDS(K2) SCREEN, URINE: ABNORMAL
THC SCREEN, URINE: ABNORMAL
TRAMADOL SCREEN URINE: ABNORMAL
TRICYCLIC ANTIDEPRESSANTS, UR: ABNORMAL

## 2024-10-08 PROCEDURE — 3017F COLORECTAL CA SCREEN DOC REV: CPT

## 2024-10-08 PROCEDURE — G8417 CALC BMI ABV UP PARAM F/U: HCPCS

## 2024-10-08 PROCEDURE — G8427 DOCREV CUR MEDS BY ELIG CLIN: HCPCS

## 2024-10-08 PROCEDURE — G8484 FLU IMMUNIZE NO ADMIN: HCPCS

## 2024-10-08 PROCEDURE — 3074F SYST BP LT 130 MM HG: CPT

## 2024-10-08 PROCEDURE — 99214 OFFICE O/P EST MOD 30 MIN: CPT

## 2024-10-08 PROCEDURE — 80305 DRUG TEST PRSMV DIR OPT OBS: CPT

## 2024-10-08 PROCEDURE — 1036F TOBACCO NON-USER: CPT

## 2024-10-08 PROCEDURE — 1123F ACP DISCUSS/DSCN MKR DOCD: CPT

## 2024-10-08 PROCEDURE — 3078F DIAST BP <80 MM HG: CPT

## 2024-10-08 PROCEDURE — G2211 COMPLEX E/M VISIT ADD ON: HCPCS

## 2024-10-08 PROCEDURE — G8399 PT W/DXA RESULTS DOCUMENT: HCPCS

## 2024-10-08 PROCEDURE — 1090F PRES/ABSN URINE INCON ASSESS: CPT

## 2024-10-08 RX ORDER — TRIAMTERENE/HYDROCHLOROTHIAZID 37.5-25 MG
1 TABLET ORAL DAILY
Qty: 90 TABLET | Refills: 1 | Status: SHIPPED | OUTPATIENT
Start: 2024-10-08

## 2024-10-08 RX ORDER — IBUPROFEN 400 MG/1
400 TABLET, FILM COATED ORAL EVERY 6 HOURS PRN
Qty: 360 TABLET | Refills: 1 | Status: SHIPPED | OUTPATIENT
Start: 2024-10-08

## 2024-10-08 RX ORDER — HYDROXYZINE HYDROCHLORIDE 25 MG/1
TABLET, FILM COATED ORAL
Qty: 120 TABLET | Refills: 1 | Status: SHIPPED | OUTPATIENT
Start: 2024-10-08

## 2024-10-08 RX ORDER — LORAZEPAM 1 MG/1
TABLET ORAL
Qty: 120 TABLET | Refills: 2 | Status: SHIPPED | OUTPATIENT
Start: 2024-10-24 | End: 2025-01-22

## 2024-10-08 ASSESSMENT — ENCOUNTER SYMPTOMS
SHORTNESS OF BREATH: 0
COUGH: 0
WHEEZING: 0
CHEST TIGHTNESS: 0
PHOTOPHOBIA: 0
BACK PAIN: 0
COLOR CHANGE: 0

## 2024-10-08 NOTE — ASSESSMENT & PLAN NOTE
-Well-controlled on program 4 times daily as needed.  -Med contract and UDS updated today.  -PDMP/OARRS reviewed.  -Refill sent to pharmacy.  -Follow-up in 3 months.  Orders:    LORazepam (ATIVAN) 1 MG tablet; TAKE 1 TABLET BY MOUTH FOUR TIMES DAILY AS NEEDED FOR ANXIETY. NO MORE THAN 4 TABLET TOTAL PER DAY

## 2024-10-08 NOTE — PROGRESS NOTES
Findings: No bruising.   Neurological:      Mental Status: She is alert and oriented to person, place, and time.   Psychiatric:         Attention and Perception: Attention and perception normal.         Mood and Affect: Mood normal. Mood is not anxious or depressed. Affect is flat. Affect is not tearful.         Speech: Speech normal.         Behavior: Behavior normal. Behavior is not withdrawn. Behavior is cooperative.         Thought Content: Thought content normal. Thought content does not include suicidal ideation.         Cognition and Memory: Cognition and memory normal.         Judgment: Judgment normal.                  An electronic signature was used to authenticate this note.    --Luke A Glischinski, APRN - CNP       Please note that this chart was generated using dragon dictation software.  Although every effort was made to ensure the accuracy of this automated transcription, some errors in transcription may have occurred.

## 2024-11-04 DIAGNOSIS — E78.2 MIXED HYPERLIPIDEMIA: ICD-10-CM

## 2024-11-04 RX ORDER — EZETIMIBE 10 MG/1
10 TABLET ORAL DAILY
Qty: 90 TABLET | Refills: 0 | Status: SHIPPED | OUTPATIENT
Start: 2024-11-04

## 2024-12-18 ENCOUNTER — OFFICE VISIT (OUTPATIENT)
Dept: BREAST CENTER | Age: 69
End: 2024-12-18
Payer: MEDICARE

## 2024-12-18 ENCOUNTER — HOSPITAL ENCOUNTER (OUTPATIENT)
Dept: WOMENS IMAGING | Age: 69
Discharge: HOME | End: 2024-12-18
Payer: MEDICARE

## 2024-12-18 VITALS — BODY MASS INDEX: 26.37 KG/M2 | WEIGHT: 174 LBS | HEIGHT: 68 IN

## 2024-12-18 DIAGNOSIS — Z17.0 MALIGNANT NEOPLASM OF UPPER-INNER QUADRANT OF RIGHT BREAST IN FEMALE, ESTROGEN RECEPTOR POSITIVE (HCC): ICD-10-CM

## 2024-12-18 DIAGNOSIS — Z90.13 S/P PARTIAL MASTECTOMY, BILATERAL: ICD-10-CM

## 2024-12-18 DIAGNOSIS — Z12.31 BREAST CANCER SCREENING BY MAMMOGRAM: ICD-10-CM

## 2024-12-18 DIAGNOSIS — C50.211 MALIGNANT NEOPLASM OF UPPER-INNER QUADRANT OF RIGHT BREAST IN FEMALE, ESTROGEN RECEPTOR POSITIVE (HCC): ICD-10-CM

## 2024-12-18 DIAGNOSIS — Z12.31 VISIT FOR SCREENING MAMMOGRAM: ICD-10-CM

## 2024-12-18 DIAGNOSIS — R92.333 HETEROGENEOUSLY DENSE TISSUE OF BOTH BREASTS ON MAMMOGRAPHY: ICD-10-CM

## 2024-12-18 DIAGNOSIS — D05.12 NEOPLASM OF LEFT BREAST, PRIMARY TUMOR STAGING CATEGORY TIS: DUCTAL CARCINOMA IN SITU (DCIS): Primary | ICD-10-CM

## 2024-12-18 PROCEDURE — 3017F COLORECTAL CA SCREEN DOC REV: CPT | Performed by: SURGERY

## 2024-12-18 PROCEDURE — 1123F ACP DISCUSS/DSCN MKR DOCD: CPT | Performed by: SURGERY

## 2024-12-18 PROCEDURE — G8427 DOCREV CUR MEDS BY ELIG CLIN: HCPCS | Performed by: SURGERY

## 2024-12-18 PROCEDURE — 99213 OFFICE O/P EST LOW 20 MIN: CPT | Performed by: SURGERY

## 2024-12-18 PROCEDURE — 1090F PRES/ABSN URINE INCON ASSESS: CPT | Performed by: SURGERY

## 2024-12-18 PROCEDURE — 1159F MED LIST DOCD IN RCRD: CPT | Performed by: SURGERY

## 2024-12-18 PROCEDURE — 1036F TOBACCO NON-USER: CPT | Performed by: SURGERY

## 2024-12-18 PROCEDURE — G8399 PT W/DXA RESULTS DOCUMENT: HCPCS | Performed by: SURGERY

## 2024-12-18 PROCEDURE — G8484 FLU IMMUNIZE NO ADMIN: HCPCS | Performed by: SURGERY

## 2024-12-18 PROCEDURE — 77063 BREAST TOMOSYNTHESIS BI: CPT

## 2024-12-18 PROCEDURE — G2211 COMPLEX E/M VISIT ADD ON: HCPCS | Performed by: SURGERY

## 2024-12-18 PROCEDURE — G8417 CALC BMI ABV UP PARAM F/U: HCPCS | Performed by: SURGERY

## 2024-12-18 NOTE — PROGRESS NOTES
12/18/2024    Chief Complaint   Patient presents with    1 Year Follow Up     Yearly exam no concerns         HPI Patient is s/p right lumpectomy/snbx 6/2022 for T1aN0 ductal carcinoma, ER pos, [place] neg, Her2 neg, final path showed no residual invasive carcinoma. 12 mm area of LCIS noted. 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 Postop bilateral accelerated radiation, finished 8/2022. She opted not to take an AI. Patient has not felt any breast masses, no breast pain or nipple discharge.      Bilateral screening mammogram today BIRADS 2C  Breast MRI 7/2024 BIRADS 2      Current Outpatient Medications:     ezetimibe (ZETIA) 10 MG tablet, TAKE 1 TABLET BY MOUTH DAILY, Disp: 90 tablet, Rfl: 0    ibuprofen (ADVIL;MOTRIN) 400 MG tablet, TAKE 1 TABLET BY MOUTH EVERY 6 HOURS AS NEEDED FOR PAIN, Disp: 360 tablet, Rfl: 1    triamterene-hydroCHLOROthiazide (MAXZIDE-25) 37.5-25 MG per tablet, TAKE 1 TABLET BY MOUTH DAILY, Disp: 90 tablet, Rfl: 1    hydrOXYzine HCl (ATARAX) 25 MG tablet, TAKE 1 TO 2 TABLETS BY MOUTH EVERY 6 HOURS AS NEEDED FOR ITCHING OR ANXIETY OR INSOMNIA, Disp: 120 tablet, Rfl: 1    LORazepam (ATIVAN) 1 MG tablet, TAKE 1 TABLET BY MOUTH FOUR TIMES DAILY AS NEEDED FOR ANXIETY. NO MORE THAN 4 TABLET TOTAL PER DAY, Disp: 120 tablet, Rfl: 2    busPIRone (BUSPAR) 15 MG tablet, Take 15 mg by mouth 2 times daily, Disp: 180 tablet, Rfl: 0    tiZANidine (ZANAFLEX) 4 MG tablet, TAKE ONE TABLET BY MOUTH EVERY 8 HOURS AS NEEDED FOR PAIN OR MUSCLE CRAMPS, Disp: 270 tablet, Rfl: 0    atenolol (TENORMIN) 50 MG tablet, TAKE 1 TABLET BY MOUTH DAILY, Disp: 90 tablet, Rfl: 0    cloNIDine (CATAPRES) 0.1 MG tablet, TAKE 1 TABLET BY MOUTH TWICE DAILY, TAKE EXTRA TABLET DAILY AS NEEDED FOR SYSTOLIC BLOOD PRESSURE GREATER THAN 170, Disp: 270 tablet, Rfl: 0    dicyclomine (BENTYL) 20 MG tablet, Take 1 tablet by mouth every 6 hours as needed (AS NEEDED FOR ABD PAIN), Disp: 360

## 2024-12-19 DIAGNOSIS — F41.8 DEPRESSION WITH ANXIETY: ICD-10-CM

## 2024-12-19 DIAGNOSIS — F41.1 GAD (GENERALIZED ANXIETY DISORDER): ICD-10-CM

## 2024-12-19 DIAGNOSIS — M47.816 FACET ARTHROPATHY, LUMBAR: ICD-10-CM

## 2024-12-19 DIAGNOSIS — I10 ESSENTIAL HYPERTENSION: ICD-10-CM

## 2024-12-19 RX ORDER — BUSPIRONE HYDROCHLORIDE 15 MG/1
15 TABLET ORAL 2 TIMES DAILY
Qty: 180 TABLET | Refills: 0 | Status: SHIPPED | OUTPATIENT
Start: 2024-12-19

## 2024-12-19 RX ORDER — ATENOLOL 50 MG/1
50 TABLET ORAL DAILY
Qty: 90 TABLET | Refills: 0 | Status: SHIPPED | OUTPATIENT
Start: 2024-12-19

## 2024-12-30 ENCOUNTER — TELEPHONE (OUTPATIENT)
Dept: CASE MANAGEMENT | Age: 69
End: 2024-12-30

## 2025-01-02 NOTE — PATIENT INSTRUCTIONS
You may receive a survey regarding the care you received during your visit.  Your input is valuable to us.  We encourage you to complete and return your survey. We hope you will choose us in the future for your healthcare needs.

## 2025-01-03 ENCOUNTER — OFFICE VISIT (OUTPATIENT)
Dept: FAMILY MEDICINE CLINIC | Age: 70
End: 2025-01-03

## 2025-01-03 VITALS
HEIGHT: 68 IN | BODY MASS INDEX: 26.46 KG/M2 | HEART RATE: 77 BPM | DIASTOLIC BLOOD PRESSURE: 74 MMHG | WEIGHT: 174.6 LBS | OXYGEN SATURATION: 97 % | SYSTOLIC BLOOD PRESSURE: 136 MMHG

## 2025-01-03 DIAGNOSIS — Z17.0 MALIGNANT NEOPLASM OF UPPER-INNER QUADRANT OF RIGHT BREAST IN FEMALE, ESTROGEN RECEPTOR POSITIVE (HCC): ICD-10-CM

## 2025-01-03 DIAGNOSIS — F41.1 GAD (GENERALIZED ANXIETY DISORDER): ICD-10-CM

## 2025-01-03 DIAGNOSIS — F41.8 DEPRESSION WITH ANXIETY: ICD-10-CM

## 2025-01-03 DIAGNOSIS — C50.211 MALIGNANT NEOPLASM OF UPPER-INNER QUADRANT OF RIGHT BREAST IN FEMALE, ESTROGEN RECEPTOR POSITIVE (HCC): ICD-10-CM

## 2025-01-03 RX ORDER — LORAZEPAM 1 MG/1
TABLET ORAL
Qty: 120 TABLET | Refills: 2 | Status: SHIPPED | OUTPATIENT
Start: 2025-01-18 | End: 2025-04-18

## 2025-01-03 RX ORDER — DEXTROAMPHETAMINE SACCHARATE, AMPHETAMINE ASPARTATE MONOHYDRATE, DEXTROAMPHETAMINE SULFATE AND AMPHETAMINE SULFATE 7.5; 7.5; 7.5; 7.5 MG/1; MG/1; MG/1; MG/1
30 CAPSULE, EXTENDED RELEASE ORAL EVERY MORNING
COMMUNITY

## 2025-01-03 RX ORDER — VENLAFAXINE HYDROCHLORIDE 75 MG/1
75 CAPSULE, EXTENDED RELEASE ORAL DAILY
Qty: 90 CAPSULE | Refills: 3 | Status: CANCELLED | OUTPATIENT
Start: 2025-01-03

## 2025-01-03 ASSESSMENT — ENCOUNTER SYMPTOMS
COUGH: 0
PHOTOPHOBIA: 0
CHEST TIGHTNESS: 0
SHORTNESS OF BREATH: 0
WHEEZING: 0

## 2025-01-03 ASSESSMENT — PATIENT HEALTH QUESTIONNAIRE - PHQ9
2. FEELING DOWN, DEPRESSED OR HOPELESS: SEVERAL DAYS
1. LITTLE INTEREST OR PLEASURE IN DOING THINGS: SEVERAL DAYS
6. FEELING BAD ABOUT YOURSELF - OR THAT YOU ARE A FAILURE OR HAVE LET YOURSELF OR YOUR FAMILY DOWN: NOT AT ALL
SUM OF ALL RESPONSES TO PHQ QUESTIONS 1-9: 7
5. POOR APPETITE OR OVEREATING: NOT AT ALL
7. TROUBLE CONCENTRATING ON THINGS, SUCH AS READING THE NEWSPAPER OR WATCHING TELEVISION: SEVERAL DAYS
10. IF YOU CHECKED OFF ANY PROBLEMS, HOW DIFFICULT HAVE THESE PROBLEMS MADE IT FOR YOU TO DO YOUR WORK, TAKE CARE OF THINGS AT HOME, OR GET ALONG WITH OTHER PEOPLE: SOMEWHAT DIFFICULT
SUM OF ALL RESPONSES TO PHQ QUESTIONS 1-9: 7
9. THOUGHTS THAT YOU WOULD BE BETTER OFF DEAD, OR OF HURTING YOURSELF: NOT AT ALL
8. MOVING OR SPEAKING SO SLOWLY THAT OTHER PEOPLE COULD HAVE NOTICED. OR THE OPPOSITE, BEING SO FIGETY OR RESTLESS THAT YOU HAVE BEEN MOVING AROUND A LOT MORE THAN USUAL: SEVERAL DAYS
3. TROUBLE FALLING OR STAYING ASLEEP: NOT AT ALL
SUM OF ALL RESPONSES TO PHQ QUESTIONS 1-9: 7
4. FEELING TIRED OR HAVING LITTLE ENERGY: NEARLY EVERY DAY
SUM OF ALL RESPONSES TO PHQ QUESTIONS 1-9: 7
SUM OF ALL RESPONSES TO PHQ9 QUESTIONS 1 & 2: 2

## 2025-01-03 NOTE — ASSESSMENT & PLAN NOTE
Chronic, at goal (stable), continue current treatment plan  -In remission  -Continue following breast specialist

## 2025-01-03 NOTE — ASSESSMENT & PLAN NOTE
-Well-controlled on lorazepam 1 mg 4 times daily as needed.  -Med contract and UDS up-to-date as of October 2024.  -PDMP/OARRS reviewed.  -Refill sent to pharmacy.  -Follow-up in 3 months.    Orders:    LORazepam (ATIVAN) 1 MG tablet; TAKE 1 TABLET BY MOUTH FOUR TIMES DAILY AS NEEDED FOR ANXIETY. NO MORE THAN 4 TABLET TOTAL PER DAY

## 2025-01-03 NOTE — PROGRESS NOTES
Sera Weaver (:  1955) is a 69 y.o. female,Established patient, here for evaluation of the following chief complaint(s):  Anxiety (ANXIETY ROUTINE FOLLOW UP UDS AND MC DONE ON 10/2/2024 ) and Other (Declined flu shot)         Assessment & Plan  KEVIN (generalized anxiety disorder)   -Well-controlled on lorazepam 1 mg 4 times daily as needed.  -Med contract and UDS up-to-date as of 2024.  -PDMP/OARRS reviewed.  -Refill sent to pharmacy.  -Follow-up in 3 months.    Orders:    LORazepam (ATIVAN) 1 MG tablet; TAKE 1 TABLET BY MOUTH FOUR TIMES DAILY AS NEEDED FOR ANXIETY. NO MORE THAN 4 TABLET TOTAL PER DAY    Depression with anxiety   Chronic, at goal (stable), continue current treatment plan       Malignant neoplasm of upper-inner quadrant of right breast in female, estrogen receptor positive (HCC)   Chronic, at goal (stable), continue current treatment plan  -In remission  -Continue following breast specialist         Return in about 3 months (around 4/3/2025) for FOLLOW UP WITH NURSE PRACTITIONER NEXT VISIT, ANXIETY, AWV.       Subjective   HPI  -teeth do not stay in well when she eats  -holidays usually a bit hard  -new dog is keeping her active  -has an appointment w/ a neurologist  -saw neurosurgeon, interested in MG dx, seeing Neuro Dr Hanna for eval  -breast cancer is gone, f/u annually w/ breast specialist  -has not had esophagus stretched, but think she needs it  -notes hx hiatal hernia repair, worse since then  -glucosamine helps joints  -sees psych next week, Adderall management though her  -mind can still wander if she gets upset, otherwise works well  -eating things that are easy to chew, soups  -too painful to eat w/ current temporary teeth  -she is eating more highly processed and unhealthy foods, negatively impacting her randa, and interrupting her ability to eat adequately       Review of Systems   Constitutional:  Positive for fatigue. Negative for activity change, appetite

## 2025-01-21 ENCOUNTER — PATIENT MESSAGE (OUTPATIENT)
Dept: FAMILY MEDICINE CLINIC | Age: 70
End: 2025-01-21

## 2025-01-21 DIAGNOSIS — E03.9 HYPOTHYROIDISM, UNSPECIFIED TYPE: ICD-10-CM

## 2025-01-21 DIAGNOSIS — R79.89 ELEVATED TSH: Primary | ICD-10-CM

## 2025-01-21 DIAGNOSIS — F41.1 GAD (GENERALIZED ANXIETY DISORDER): ICD-10-CM

## 2025-01-29 ENCOUNTER — LAB (OUTPATIENT)
Dept: FAMILY MEDICINE CLINIC | Age: 70
End: 2025-01-29
Payer: MEDICARE

## 2025-01-29 ENCOUNTER — TELEPHONE (OUTPATIENT)
Dept: FAMILY MEDICINE CLINIC | Age: 70
End: 2025-01-29

## 2025-01-29 DIAGNOSIS — F41.1 GAD (GENERALIZED ANXIETY DISORDER): ICD-10-CM

## 2025-01-29 DIAGNOSIS — R79.89 ELEVATED TSH: ICD-10-CM

## 2025-01-29 LAB
T4 FREE SERPL-MCNC: 0.9 NG/DL (ref 0.9–1.8)
TSH SERPL DL<=0.005 MIU/L-ACNC: 5.05 UIU/ML (ref 0.27–4.2)

## 2025-01-29 PROCEDURE — 36415 COLL VENOUS BLD VENIPUNCTURE: CPT

## 2025-01-29 NOTE — TELEPHONE ENCOUNTER
Patient came in for her labs, she stated she needed a letter for her handicap parking sticker to be renewed. She said it expires sometime this year but doesn't know when and her last one was for 5 years.  Can we do this letter for her?    Please give her a call back.

## 2025-01-29 NOTE — TELEPHONE ENCOUNTER
David needs to make that decision.  Does not look like he has had to do a handicap placard for her before, and I am not familiar with her.

## 2025-01-30 RX ORDER — LEVOTHYROXINE SODIUM 25 UG/1
25 TABLET ORAL DAILY
Qty: 90 TABLET | Refills: 0 | Status: SHIPPED | OUTPATIENT
Start: 2025-01-30

## 2025-02-06 DIAGNOSIS — E78.2 MIXED HYPERLIPIDEMIA: ICD-10-CM

## 2025-02-06 DIAGNOSIS — J30.2 SEASONAL ALLERGIES: ICD-10-CM

## 2025-02-06 DIAGNOSIS — I10 ESSENTIAL HYPERTENSION: ICD-10-CM

## 2025-02-06 RX ORDER — CLONIDINE HYDROCHLORIDE 0.1 MG/1
TABLET ORAL
Qty: 270 TABLET | Refills: 0 | OUTPATIENT
Start: 2025-02-06

## 2025-02-06 RX ORDER — HYDROXYZINE HYDROCHLORIDE 25 MG/1
TABLET, FILM COATED ORAL
Qty: 120 TABLET | Refills: 1 | Status: SHIPPED | OUTPATIENT
Start: 2025-02-06

## 2025-02-06 RX ORDER — CLONIDINE HYDROCHLORIDE 0.1 MG/1
TABLET ORAL
Qty: 270 TABLET | Refills: 0 | Status: SHIPPED | OUTPATIENT
Start: 2025-02-06

## 2025-02-06 RX ORDER — EZETIMIBE 10 MG/1
10 TABLET ORAL DAILY
Qty: 90 TABLET | Refills: 0 | OUTPATIENT
Start: 2025-02-06

## 2025-02-06 RX ORDER — HYDROXYZINE HYDROCHLORIDE 25 MG/1
TABLET, FILM COATED ORAL
Qty: 120 TABLET | Refills: 1 | OUTPATIENT
Start: 2025-02-06

## 2025-02-06 RX ORDER — EZETIMIBE 10 MG/1
10 TABLET ORAL DAILY
Qty: 90 TABLET | Refills: 0 | Status: SHIPPED | OUTPATIENT
Start: 2025-02-06

## 2025-03-05 NOTE — PATIENT INSTRUCTIONS
To avoid our call center, call our office number [phone], and listen to the options.  After listening to all English options, select the behavioral health option (option 1).  This should get you to our front office.      [facility] 211   Speak to a trained professional 24/7 who can connect you to essential community services including food, clothing, transportation, housing, utilities, employment services, childcare, and baby supplies. 211 serves nationwide.  [address] [place] [address].[employer] for resources in [facility], [place] and [facility] in [place]; [place], [place], [place], and [facility] in [place].   [employer]/resources for resources in [facility], [place], [place], [place], [place], [place], [place], [facility], [place], [place], and [facility] in [place].    [employer] [place]   What they offer: [employer] [place] is a nationwide network of food reyes, food pantries, and meal programs.  Website: https://www.feedingamerica.org/find-your-local-foodbank      National Hunger Hotline  What they offer: The hotline is a resource for individuals and families seeking information on how and where to obtain food.   Website:  https://www.hungerfreeamerica.org/en-us/[place]-national-hunger-hotline    Hunger Hotline Phone Number: 2-040-9-HUNGRY (1-976.282.7403)  Hours of Operation: Monday - Friday 7am to 10pm   The Hunger Hotline also operates a texting service. Our number is 109-332-5448. Please note that these are automated texts and we do not reply or monitor texts.   [employer]  What they offer: $1 for $1 matching for families receiving SNAP/food stamps when spent on “healthy” food.  The match money can be used to purchase fruits and vegetables so adds more healthy food choices for SNAP beneficiaries.   Contact: https://[employer]/locations/           SNAP (formerly [employer] [place])   What they offer: SNAP is used like cash to buy eligible food items from authorized retailers.  Apply for benefits

## 2025-03-06 ENCOUNTER — OFFICE VISIT (OUTPATIENT)
Dept: FAMILY MEDICINE CLINIC | Age: 70
End: 2025-03-06

## 2025-03-06 VITALS
DIASTOLIC BLOOD PRESSURE: 82 MMHG | HEART RATE: 86 BPM | OXYGEN SATURATION: 97 % | HEIGHT: 68 IN | BODY MASS INDEX: 26.73 KG/M2 | SYSTOLIC BLOOD PRESSURE: 130 MMHG | WEIGHT: 176.4 LBS

## 2025-03-06 DIAGNOSIS — Z00.00 MEDICARE ANNUAL WELLNESS VISIT, SUBSEQUENT: Primary | ICD-10-CM

## 2025-03-06 DIAGNOSIS — E78.2 MIXED HYPERLIPIDEMIA: ICD-10-CM

## 2025-03-06 DIAGNOSIS — I10 ESSENTIAL HYPERTENSION: ICD-10-CM

## 2025-03-06 DIAGNOSIS — E03.9 HYPOTHYROIDISM, UNSPECIFIED TYPE: ICD-10-CM

## 2025-03-06 DIAGNOSIS — Z87.891 PERSONAL HISTORY OF TOBACCO USE: ICD-10-CM

## 2025-03-06 DIAGNOSIS — Z23 NEED FOR INFLUENZA VACCINATION: ICD-10-CM

## 2025-03-06 DIAGNOSIS — Z13.1 SCREENING FOR DIABETES MELLITUS: ICD-10-CM

## 2025-03-06 DIAGNOSIS — J30.2 SEASONAL ALLERGIES: ICD-10-CM

## 2025-03-06 DIAGNOSIS — M54.89 OTHER CHRONIC BACK PAIN: ICD-10-CM

## 2025-03-06 DIAGNOSIS — G89.29 OTHER CHRONIC BACK PAIN: ICD-10-CM

## 2025-03-06 LAB
ALBUMIN SERPL-MCNC: 4.3 G/DL (ref 3.4–5)
ALBUMIN/GLOB SERPL: 2 {RATIO} (ref 1.1–2.2)
ALP SERPL-CCNC: 128 U/L (ref 40–129)
ALT SERPL-CCNC: 29 U/L (ref 10–40)
ANION GAP SERPL CALCULATED.3IONS-SCNC: 14 MMOL/L (ref 3–16)
AST SERPL-CCNC: 24 U/L (ref 15–37)
BILIRUB SERPL-MCNC: <0.2 MG/DL (ref 0–1)
BUN SERPL-MCNC: 18 MG/DL (ref 7–20)
CALCIUM SERPL-MCNC: 9.2 MG/DL (ref 8.3–10.6)
CHLORIDE SERPL-SCNC: 97 MMOL/L (ref 99–110)
CHOLEST SERPL-MCNC: 283 MG/DL (ref 0–199)
CO2 SERPL-SCNC: 27 MMOL/L (ref 21–32)
CREAT SERPL-MCNC: 0.7 MG/DL (ref 0.6–1.2)
GFR SERPLBLD CREATININE-BSD FMLA CKD-EPI: >90 ML/MIN/{1.73_M2}
GLUCOSE SERPL-MCNC: 95 MG/DL (ref 70–99)
HDLC SERPL-MCNC: 47 MG/DL (ref 40–60)
LDLC SERPL CALC-MCNC: 186 MG/DL
POTASSIUM SERPL-SCNC: 3.8 MMOL/L (ref 3.5–5.1)
PROT SERPL-MCNC: 6.4 G/DL (ref 6.4–8.2)
SODIUM SERPL-SCNC: 138 MMOL/L (ref 136–145)
T4 FREE SERPL-MCNC: 0.8 NG/DL (ref 0.9–1.8)
TRIGL SERPL-MCNC: 249 MG/DL (ref 0–150)
TSH SERPL DL<=0.005 MIU/L-ACNC: 4.42 UIU/ML (ref 0.27–4.2)
VLDLC SERPL CALC-MCNC: 50 MG/DL

## 2025-03-06 RX ORDER — ALBUTEROL SULFATE 90 UG/1
2 INHALANT RESPIRATORY (INHALATION) EVERY 6 HOURS PRN
Qty: 54 G | Refills: 3 | Status: SHIPPED | OUTPATIENT
Start: 2025-03-06

## 2025-03-06 RX ORDER — DEXTROAMPHETAMINE SACCHARATE, AMPHETAMINE ASPARTATE MONOHYDRATE, DEXTROAMPHETAMINE SULFATE AND AMPHETAMINE SULFATE 5; 5; 5; 5 MG/1; MG/1; MG/1; MG/1
20 CAPSULE, EXTENDED RELEASE ORAL
COMMUNITY

## 2025-03-06 SDOH — ECONOMIC STABILITY: FOOD INSECURITY: WITHIN THE PAST 12 MONTHS, YOU WORRIED THAT YOUR FOOD WOULD RUN OUT BEFORE YOU GOT MONEY TO BUY MORE.: SOMETIMES TRUE

## 2025-03-06 SDOH — ECONOMIC STABILITY: INCOME INSECURITY: IN THE LAST 12 MONTHS, WAS THERE A TIME WHEN YOU WERE NOT ABLE TO PAY THE MORTGAGE OR RENT ON TIME?: YES

## 2025-03-06 SDOH — ECONOMIC STABILITY: FOOD INSECURITY: WITHIN THE PAST 12 MONTHS, THE FOOD YOU BOUGHT JUST DIDN'T LAST AND YOU DIDN'T HAVE MONEY TO GET MORE.: NEVER TRUE

## 2025-03-06 SDOH — HEALTH STABILITY: PHYSICAL HEALTH
ON AVERAGE, HOW MANY DAYS PER WEEK DO YOU ENGAGE IN MODERATE TO STRENUOUS EXERCISE (LIKE A BRISK WALK)?: PATIENT DECLINED

## 2025-03-06 SDOH — HEALTH STABILITY: PHYSICAL HEALTH: ON AVERAGE, HOW MANY MINUTES DO YOU ENGAGE IN EXERCISE AT THIS LEVEL?: 20 MIN

## 2025-03-06 SDOH — ECONOMIC STABILITY: TRANSPORTATION INSECURITY
IN THE PAST 12 MONTHS, HAS LACK OF TRANSPORTATION KEPT YOU FROM MEETINGS, WORK, OR FROM GETTING THINGS NEEDED FOR DAILY LIVING?: NO

## 2025-03-06 SDOH — ECONOMIC STABILITY: TRANSPORTATION INSECURITY
IN THE PAST 12 MONTHS, HAS THE LACK OF TRANSPORTATION KEPT YOU FROM MEDICAL APPOINTMENTS OR FROM GETTING MEDICATIONS?: NO

## 2025-03-06 ASSESSMENT — LIFESTYLE VARIABLES
HOW MANY STANDARD DRINKS CONTAINING ALCOHOL DO YOU HAVE ON A TYPICAL DAY: 1
HOW OFTEN DO YOU HAVE A DRINK CONTAINING ALCOHOL: 2
HOW OFTEN DO YOU HAVE A DRINK CONTAINING ALCOHOL: MONTHLY OR LESS
HOW OFTEN DO YOU HAVE SIX OR MORE DRINKS ON ONE OCCASION: 1
HOW MANY STANDARD DRINKS CONTAINING ALCOHOL DO YOU HAVE ON A TYPICAL DAY: 1 OR 2

## 2025-03-06 ASSESSMENT — PATIENT HEALTH QUESTIONNAIRE - PHQ9
2. FEELING DOWN, DEPRESSED OR HOPELESS: MORE THAN HALF THE DAYS
8. MOVING OR SPEAKING SO SLOWLY THAT OTHER PEOPLE COULD HAVE NOTICED. OR THE OPPOSITE, BEING SO FIGETY OR RESTLESS THAT YOU HAVE BEEN MOVING AROUND A LOT MORE THAN USUAL: SEVERAL DAYS
10. IF YOU CHECKED OFF ANY PROBLEMS, HOW DIFFICULT HAVE THESE PROBLEMS MADE IT FOR YOU TO DO YOUR WORK, TAKE CARE OF THINGS AT HOME, OR GET ALONG WITH OTHER PEOPLE: SOMEWHAT DIFFICULT
SUM OF ALL RESPONSES TO PHQ QUESTIONS 1-9: 15
3. TROUBLE FALLING OR STAYING ASLEEP: MORE THAN HALF THE DAYS
4. FEELING TIRED OR HAVING LITTLE ENERGY: NEARLY EVERY DAY
9. THOUGHTS THAT YOU WOULD BE BETTER OFF DEAD, OR OF HURTING YOURSELF: NOT AT ALL
6. FEELING BAD ABOUT YOURSELF - OR THAT YOU ARE A FAILURE OR HAVE LET YOURSELF OR YOUR FAMILY DOWN: MORE THAN HALF THE DAYS
5. POOR APPETITE OR OVEREATING: SEVERAL DAYS
1. LITTLE INTEREST OR PLEASURE IN DOING THINGS: NEARLY EVERY DAY
7. TROUBLE CONCENTRATING ON THINGS, SUCH AS READING THE NEWSPAPER OR WATCHING TELEVISION: SEVERAL DAYS
SUM OF ALL RESPONSES TO PHQ QUESTIONS 1-9: 15

## 2025-03-06 NOTE — ASSESSMENT & PLAN NOTE
Reassess fasting level today  -Continue ezetimibe  In the diet, limit fatty, fried, and ultra-processed foods, increase healthy fats/HDLs (such as cod, salmon, tuna, walnuts or fish oil supplements to boost HDL levels (good cholesterol)), and ensure enough fiber via 5 servings of fruits and vegetables daily. Goal of 2,000 calories a day.  Consume all calories within a 10-hour window daily.  Try not to eat within 1 to 3 hours of bedtime, or within 1 to 3 hours of waking up.  Always prefer natural or minimally processed foods and freshly made dishes and meals to ultra-processed foods.   Orders:    Lipid Panel; Future

## 2025-03-06 NOTE — ASSESSMENT & PLAN NOTE
Chronic, at goal (stable), continue current treatment plan and lifestyle modifications recommended  -Recommend compliance to DASH diet, which is limiting sodium to 2000 mg daily or less, and limit fatty, fried, and ultra-processed foods in the diet.  Increase physical exercise to 150 minutes weekly or more per [facility].  Walk more than 7000 steps daily.  Drink 64 ounces of water daily or more.  -Follow-up annually  Orders:    Comprehensive Metabolic Panel; Future    CBC with Auto Differential; Future

## 2025-03-06 NOTE — PROGRESS NOTES
Immunizations Administered       Name Date Dose Route    Influenza, FLUAD, (age 65 y+), IM, Trivalent PF, 0.5mL 3/6/2025 0.5 mL Intramuscular    Site: Deltoid- Right    Lot: [id number]    NDC: 47824-649-10            
Navigator  Cami Tom MD as Surgeon (General Surgery)     Recommendations for Preventive Services Due: see orders and patient instructions/AVS.  Recommended screening schedule for the next 5-10 years is provided to the patient in written form: see Patient Instructions/AVS.     Reviewed and updated this visit:  Tobacco  Allergies  Meds  Sexual Hx          --Luke A Glischinski, APRN - CNP       Please note that this chart was generated using dragon dictation software.  Although every effort was made to ensure the accuracy of this automated transcription, some errors in transcription may have occurred.

## 2025-03-07 LAB
BASOPHILS # BLD: 0.1 K/UL (ref 0–0.2)
BASOPHILS NFR BLD: 1.7 %
DEPRECATED RDW RBC AUTO: 14.7 % (ref 12.4–15.4)
EOSINOPHIL # BLD: 0.3 K/UL (ref 0–0.6)
EOSINOPHIL NFR BLD: 5.7 %
EST. AVERAGE GLUCOSE BLD GHB EST-MCNC: 116.9 MG/DL
HBA1C MFR BLD: 5.7 %
HCT VFR BLD AUTO: 38 % (ref 36–48)
HGB BLD-MCNC: 12.8 G/DL (ref 12–16)
LYMPHOCYTES # BLD: 2 K/UL (ref 1–5.1)
LYMPHOCYTES NFR BLD: 40.7 %
MCH RBC QN AUTO: 31.1 PG (ref 26–34)
MCHC RBC AUTO-ENTMCNC: 33.6 G/DL (ref 31–36)
MCV RBC AUTO: 92.5 FL (ref 80–100)
MONOCYTES # BLD: 0.4 K/UL (ref 0–1.3)
MONOCYTES NFR BLD: 8.4 %
NEUTROPHILS # BLD: 2.2 K/UL (ref 1.7–7.7)
NEUTROPHILS NFR BLD: 43.5 %
PLATELET # BLD AUTO: 225 K/UL (ref 135–450)
PMV BLD AUTO: 9.8 FL (ref 5–10.5)
RBC # BLD AUTO: 4.1 M/UL (ref 4–5.2)
WBC # BLD AUTO: 5 K/UL (ref 4–11)

## 2025-03-11 ENCOUNTER — RESULTS FOLLOW-UP (OUTPATIENT)
Dept: FAMILY MEDICINE CLINIC | Age: 70
End: 2025-03-11

## 2025-03-17 DIAGNOSIS — J30.2 SEASONAL ALLERGIES: ICD-10-CM

## 2025-03-17 DIAGNOSIS — M47.816 FACET ARTHROPATHY, LUMBAR: ICD-10-CM

## 2025-03-17 DIAGNOSIS — F41.1 GAD (GENERALIZED ANXIETY DISORDER): ICD-10-CM

## 2025-03-17 DIAGNOSIS — I10 ESSENTIAL HYPERTENSION: ICD-10-CM

## 2025-03-17 DIAGNOSIS — F41.8 DEPRESSION WITH ANXIETY: ICD-10-CM

## 2025-03-18 RX ORDER — FLUTICASONE PROPIONATE 50 MCG
SPRAY, SUSPENSION (ML) NASAL
Qty: 48 G | Refills: 0 | Status: SHIPPED | OUTPATIENT
Start: 2025-03-18

## 2025-03-18 RX ORDER — TRIAMTERENE AND HYDROCHLOROTHIAZIDE 37.5; 25 MG/1; MG/1
1 TABLET ORAL DAILY
Qty: 90 TABLET | Refills: 0 | Status: SHIPPED | OUTPATIENT
Start: 2025-03-18

## 2025-03-18 RX ORDER — BUSPIRONE HYDROCHLORIDE 15 MG/1
15 TABLET ORAL 2 TIMES DAILY
Qty: 180 TABLET | Refills: 0 | Status: SHIPPED | OUTPATIENT
Start: 2025-03-18

## 2025-03-19 ENCOUNTER — PATIENT MESSAGE (OUTPATIENT)
Dept: FAMILY MEDICINE CLINIC | Age: 70
End: 2025-03-19

## 2025-03-20 ENCOUNTER — E-VISIT (OUTPATIENT)
Dept: FAMILY MEDICINE CLINIC | Age: 70
End: 2025-03-20

## 2025-03-20 DIAGNOSIS — B96.89 ACUTE BACTERIAL SINUSITIS: Primary | ICD-10-CM

## 2025-03-20 DIAGNOSIS — J01.90 ACUTE BACTERIAL SINUSITIS: Primary | ICD-10-CM

## 2025-03-20 RX ORDER — METHYLPREDNISOLONE 4 MG/1
TABLET ORAL
Qty: 1 KIT | Refills: 0 | Status: SHIPPED | OUTPATIENT
Start: 2025-03-20 | End: 2025-03-26

## 2025-03-20 RX ORDER — AZITHROMYCIN 250 MG/1
TABLET, FILM COATED ORAL
Qty: 6 TABLET | Refills: 0 | Status: SHIPPED | OUTPATIENT
Start: 2025-03-20 | End: 2025-03-30

## 2025-03-20 ASSESSMENT — LIFESTYLE VARIABLES
SMOKING_STATUS: NO, I'M A FORMER SMOKER
SMOKING_YEARS: 50
PACKS_PER_DAY: 1

## 2025-03-20 NOTE — PROGRESS NOTES
1. Acute bacterial sinusitis  -Concerning for acute bacterial sinusitis  -Treatment options discussed.  Z-Hong and Medrol Dosepak are being sent to the pharmacy.   The medication uses and side effects were discussed with the patient.  Patient verbalized understanding and agrees to the plan.  -Emphasis on rest and hydration  -Can use OTCs to help with symptoms  -Follow-up if no improvement  - azithromycin (ZITHROMAX) 250 MG tablet; 500mg on day 1 followed by 250mg on days 2 - 5  Dispense: 6 tablet; Refill: 0  - methylPREDNISolone (MEDROL DOSEPACK) 4 MG tablet; Take by mouth.  Dispense: 1 kit; Refill: 0      --Luke A Glischinski, APRN - CNP      Please note that this chart was generated using dragon dictation software.  Although every effort was made to ensure the accuracy of this automated transcription, some errors in transcription may have occurred.      A total of 11-20 minutes were spent on the entirety of this E-visit.

## 2025-03-24 DIAGNOSIS — I10 ESSENTIAL HYPERTENSION: ICD-10-CM

## 2025-03-25 RX ORDER — ATENOLOL 50 MG/1
50 TABLET ORAL DAILY
Qty: 90 TABLET | Refills: 0 | Status: SHIPPED | OUTPATIENT
Start: 2025-03-25

## 2025-03-28 ENCOUNTER — PATIENT MESSAGE (OUTPATIENT)
Dept: FAMILY MEDICINE CLINIC | Age: 70
End: 2025-03-28

## 2025-04-15 RX ORDER — OMEPRAZOLE 40 MG/1
40 CAPSULE, DELAYED RELEASE ORAL 2 TIMES DAILY
Qty: 180 CAPSULE | Refills: 1 | Status: SHIPPED | OUTPATIENT
Start: 2025-04-15

## 2025-04-16 NOTE — TELEPHONE ENCOUNTER
75 MG SENT TO [facility]    Sports Medicine / Non-Operative Orthopedics ([facility]):  Thank you for allowing us the opportunity to participate in the care of your patient.   \"SERVICE TO SPORTS MEDICINE\" - your one click referral for all ages and types of orthopedic care.  (If after review of the referral it is determined that surgery is the next best treatment course, the sports medicine team will direct it to the appropriate orthopedic surgeon)  For questions regarding referral please call Jan[facility] Sports Medicine Referral Specialist @ [phone]   - Dr. Nick Milner, DO Internal Medicine/Pediatric Sports Medicine ([facility])

## 2025-04-28 ENCOUNTER — PATIENT MESSAGE (OUTPATIENT)
Dept: FAMILY MEDICINE CLINIC | Age: 70
End: 2025-04-28

## 2025-04-28 DIAGNOSIS — E03.9 HYPOTHYROIDISM, UNSPECIFIED TYPE: Primary | ICD-10-CM

## 2025-05-01 ENCOUNTER — LAB (OUTPATIENT)
Dept: FAMILY MEDICINE CLINIC | Age: 70
End: 2025-05-01
Payer: MEDICARE

## 2025-05-01 DIAGNOSIS — E03.9 HYPOTHYROIDISM, UNSPECIFIED TYPE: ICD-10-CM

## 2025-05-01 DIAGNOSIS — R53.83 FATIGUE, UNSPECIFIED TYPE: Primary | ICD-10-CM

## 2025-05-01 LAB
BASOPHILS # BLD: 0 K/UL (ref 0–0.2)
BASOPHILS NFR BLD: 0.6 %
DEPRECATED RDW RBC AUTO: 13.4 % (ref 12.4–15.4)
EOSINOPHIL # BLD: 0.2 K/UL (ref 0–0.6)
EOSINOPHIL NFR BLD: 4.1 %
HCT VFR BLD AUTO: 38.1 % (ref 36–48)
HGB BLD-MCNC: 13 G/DL (ref 12–16)
LYMPHOCYTES # BLD: 1.7 K/UL (ref 1–5.1)
LYMPHOCYTES NFR BLD: 35.6 %
MCH RBC QN AUTO: 31.4 PG (ref 26–34)
MCHC RBC AUTO-ENTMCNC: 34 G/DL (ref 31–36)
MCV RBC AUTO: 92.3 FL (ref 80–100)
MONOCYTES # BLD: 0.6 K/UL (ref 0–1.3)
MONOCYTES NFR BLD: 12.4 %
NEUTROPHILS # BLD: 2.2 K/UL (ref 1.7–7.7)
NEUTROPHILS NFR BLD: 47.3 %
PLATELET # BLD AUTO: 216 K/UL (ref 135–450)
PMV BLD AUTO: 10 FL (ref 5–10.5)
RBC # BLD AUTO: 4.13 M/UL (ref 4–5.2)
T3FREE SERPL-MCNC: 2.7 PG/ML (ref 2.3–4.2)
T4 FREE SERPL-MCNC: 1.1 NG/DL (ref 0.9–1.8)
TSH SERPL DL<=0.005 MIU/L-ACNC: 4.13 UIU/ML (ref 0.27–4.2)
WBC # BLD AUTO: 4.7 K/UL (ref 4–11)

## 2025-05-01 PROCEDURE — 36415 COLL VENOUS BLD VENIPUNCTURE: CPT

## 2025-05-05 ENCOUNTER — RESULTS FOLLOW-UP (OUTPATIENT)
Dept: FAMILY MEDICINE CLINIC | Age: 70
End: 2025-05-05

## 2025-05-08 ENCOUNTER — OFFICE VISIT (OUTPATIENT)
Dept: FAMILY MEDICINE CLINIC | Age: 70
End: 2025-05-08
Payer: MEDICARE

## 2025-05-08 VITALS
HEART RATE: 67 BPM | WEIGHT: 178 LBS | DIASTOLIC BLOOD PRESSURE: 70 MMHG | OXYGEN SATURATION: 97 % | BODY MASS INDEX: 27.06 KG/M2 | SYSTOLIC BLOOD PRESSURE: 130 MMHG

## 2025-05-08 DIAGNOSIS — F41.1 GAD (GENERALIZED ANXIETY DISORDER): ICD-10-CM

## 2025-05-08 DIAGNOSIS — E03.9 HYPOTHYROIDISM, UNSPECIFIED TYPE: ICD-10-CM

## 2025-05-08 PROCEDURE — 1160F RVW MEDS BY RX/DR IN RCRD: CPT

## 2025-05-08 PROCEDURE — G2211 COMPLEX E/M VISIT ADD ON: HCPCS

## 2025-05-08 PROCEDURE — 1123F ACP DISCUSS/DSCN MKR DOCD: CPT

## 2025-05-08 PROCEDURE — 3075F SYST BP GE 130 - 139MM HG: CPT

## 2025-05-08 PROCEDURE — 1159F MED LIST DOCD IN RCRD: CPT

## 2025-05-08 PROCEDURE — 3078F DIAST BP <80 MM HG: CPT

## 2025-05-08 PROCEDURE — 99214 OFFICE O/P EST MOD 30 MIN: CPT

## 2025-05-08 RX ORDER — LORAZEPAM 1 MG/1
TABLET ORAL
Qty: 120 TABLET | Refills: 2 | Status: SHIPPED | OUTPATIENT
Start: 2025-05-19 | End: 2025-08-17

## 2025-05-08 ASSESSMENT — ENCOUNTER SYMPTOMS
PHOTOPHOBIA: 0
COUGH: 0
BACK PAIN: 0
CHEST TIGHTNESS: 0
SHORTNESS OF BREATH: 0
COLOR CHANGE: 0

## 2025-05-08 NOTE — ASSESSMENT & PLAN NOTE
Chronic, at goal (stable), continue current treatment plan   -Well-controlled on lorazepam 1 mg 4 times daily as needed.  -Med contract and UDS up-to-date as of October 2024.  -PDMP/OARRS reviewed.  -Refill sent to pharmacy.  -Follow-up in 3 months.  Orders:    LORazepam (ATIVAN) 1 MG tablet; TAKE 1 TABLET BY MOUTH FOUR TIMES DAILY AS NEEDED FOR ANXIETY. NO MORE THAN 4 TABLET TOTAL PER DAY     Known

## 2025-05-13 DIAGNOSIS — E78.2 MIXED HYPERLIPIDEMIA: ICD-10-CM

## 2025-05-13 DIAGNOSIS — I10 ESSENTIAL HYPERTENSION: ICD-10-CM

## 2025-05-13 RX ORDER — CLONIDINE HYDROCHLORIDE 0.1 MG/1
TABLET ORAL
Qty: 270 TABLET | Refills: 0 | OUTPATIENT
Start: 2025-05-13

## 2025-05-13 RX ORDER — EZETIMIBE 10 MG/1
10 TABLET ORAL DAILY
Qty: 90 TABLET | Refills: 0 | OUTPATIENT
Start: 2025-05-13

## 2025-05-13 RX ORDER — EZETIMIBE 10 MG/1
10 TABLET ORAL DAILY
Qty: 90 TABLET | Refills: 1 | Status: SHIPPED | OUTPATIENT
Start: 2025-05-13

## 2025-05-13 RX ORDER — CLONIDINE HYDROCHLORIDE 0.1 MG/1
TABLET ORAL
Qty: 270 TABLET | Refills: 1 | Status: SHIPPED | OUTPATIENT
Start: 2025-05-13

## 2025-05-19 RX ORDER — IBUPROFEN 400 MG/1
400 TABLET, FILM COATED ORAL EVERY 6 HOURS PRN
Qty: 360 TABLET | Refills: 1 | Status: SHIPPED | OUTPATIENT
Start: 2025-05-19

## 2025-06-24 NOTE — TELEPHONE ENCOUNTER
Interventional Radiology Discharge Instructions    You may resume your previous diet and medication  You may resume your previous activity level on 6/25/25  Follow up recommendations: See follow up section  If you have any questions or concerns, please call our Interventional Radiology department at [phone] (Monday to Friday 7:30 a.m. to 5:00 p.m.)    Fall Because of Dizziness, Weakness, or Loss of Balance    Many things can cause you to become dizzy. Everyone means a little something different by the word dizzy. People may describe their symptoms using these words:   It doesn't feel right in my head  It feels like spinning in my head  It seems like the room is spinning  My balance feels off  I feel lightheaded, like I am going to pass out  All these descriptions can have real causes.     Your balance mechanism is in your inner ear. Anything disturbing it can make you feel dizzy, whether it is from a cold, an injury, or many other things. Anything that causes your blood pressure to drop suddenly can make you feel lightheaded, or like you are going to faint. This is because at that moment there might not be enough blood flowing to your brain. Causes include:   Medicines  Dehydration  Standing up or bending over too quickly  Becoming overheated  Taking a hot shower or bath  Straining hard while lifting something or using the toilet  Strokes, heart attack, heart valve disease, very slow or very fast heart rate  Low blood sugar  Ear infection  Hyperventilation  Anemia  Injury  Infection  Panic attack  Pregnancy  You may be at risk of repeat falls. Take precautions described below to prevent falling.     Home care:  If you become lightheaded or dizzy, sit or lie down immediately. It is better to do this than fall and seriously hurt or injure yourself.  Be sure any lightheadedness or dizziness goes away before standing and walking.  If you use a walker or cane, be sure to use it whenever you walk, even if it is a  Responded via my chart short distance.  If you were injured during the fall, follow the advice from your healthcare provider regarding care of your injury.  Be sure your healthcare provider knows all the medicines, herbs, and supplements you take. Some medicines can cause dizziness.  Cut back on alcohol use.    Follow-up care:  Unless given other advice, call your healthcare provider on the next office day to advise of your fall and to schedule an appointment. You may require further treatment for the underlying condition that caused today's fall.   If X-ray or a CT scan were done, you will be notified of the results, especially if it affects treatment.     Call 911:  Call 911 if any of these occur:   Trouble breathing  Confused or trouble arousing  Fainting or loss of consciousness  Rapid or very slow heart rate  Seizure  Trouble with speech or vision, weakness of an arm or leg  Trouble walking or talking, loss of balance  Numbness or weakness in one side of your body, facial droop    When to seek medical advice:  Call your healthcare provider right away if any of the following occur:  Another fall  Continued dizzy spells  Severe headache  Blood in vomit or stools (black or red color)    [employer] last reviewed this educational content on 4/1/2018  © 4162-8129 [employer]. All rights reserved. This information is not intended as a substitute for professional medical care. Always follow your healthcare professional's instructions.     Recovery After Procedural Sedation (Adult)    You have been given medicine by vein to make you sleep during your procedure. This may have included both a pain medicine and sleeping medicine. Most of the effects have worn off. But you may still have some drowsiness for the next 6 to 8 hours.    Please go directly home to rest for the remainder of the day.    Home care    Follow these guidelines when you get home:  For the next 8 hours, you should be watched by a responsible adult. This person  should make sure your condition is not getting worse.  You may resume your regular diet.  Don't drink any alcohol for the next 24 hours.  Don't drive, operate dangerous machinery, make important business or personal decisions, or sign legal documents during the next 24 hours.    Note: Your healthcare provider may tell you not to take any medicine by mouth for pain or sleep in the next 4 hours. These medicines may react with the medicines you were given in the hospital. This could cause a much stronger response than usual.    Follow-up care    Follow up with your healthcare provider if you are not alert and back to your usual level of activity within 12 hours.    When to seek medical advice  Call your healthcare provider right away if any of these occur:  Drowsiness gets worse  Weakness or dizziness gets worse  Repeated vomiting  You can't be awakened     Date Last Reviewed: 10/18/2016  © 1343-8914 The [employer] [address] [place] [address], [place] [address]. All rights reserved. This information is not intended as a substitute for professional medical care. Always follow your healthcare professional's instructions.

## 2025-06-29 DIAGNOSIS — F41.1 GAD (GENERALIZED ANXIETY DISORDER): ICD-10-CM

## 2025-06-29 DIAGNOSIS — M47.816 FACET ARTHROPATHY, LUMBAR: ICD-10-CM

## 2025-06-29 DIAGNOSIS — F41.8 DEPRESSION WITH ANXIETY: ICD-10-CM

## 2025-06-29 DIAGNOSIS — I10 ESSENTIAL HYPERTENSION: ICD-10-CM

## 2025-06-30 RX ORDER — BUSPIRONE HYDROCHLORIDE 15 MG/1
15 TABLET ORAL 2 TIMES DAILY
Qty: 180 TABLET | Refills: 0 | Status: SHIPPED | OUTPATIENT
Start: 2025-06-30

## 2025-06-30 RX ORDER — TRIAMTERENE AND HYDROCHLOROTHIAZIDE 37.5; 25 MG/1; MG/1
1 TABLET ORAL DAILY
Qty: 90 TABLET | Refills: 0 | Status: SHIPPED | OUTPATIENT
Start: 2025-06-30

## 2025-07-15 DIAGNOSIS — I10 ESSENTIAL HYPERTENSION: ICD-10-CM

## 2025-07-16 RX ORDER — ATENOLOL 50 MG/1
50 TABLET ORAL DAILY
Qty: 90 TABLET | Refills: 1 | Status: SHIPPED | OUTPATIENT
Start: 2025-07-16

## 2025-08-01 ENCOUNTER — OFFICE VISIT (OUTPATIENT)
Dept: FAMILY MEDICINE CLINIC | Age: 70
End: 2025-08-01
Payer: MEDICARE

## 2025-08-01 VITALS
WEIGHT: 186 LBS | HEART RATE: 75 BPM | DIASTOLIC BLOOD PRESSURE: 76 MMHG | HEIGHT: 68 IN | BODY MASS INDEX: 28.19 KG/M2 | SYSTOLIC BLOOD PRESSURE: 140 MMHG | OXYGEN SATURATION: 100 % | TEMPERATURE: 97.8 F

## 2025-08-01 DIAGNOSIS — J01.00 ACUTE NON-RECURRENT MAXILLARY SINUSITIS: ICD-10-CM

## 2025-08-01 DIAGNOSIS — F41.1 GAD (GENERALIZED ANXIETY DISORDER): Primary | ICD-10-CM

## 2025-08-01 DIAGNOSIS — Z79.899 ENCOUNTER FOR LONG-TERM (CURRENT) USE OF MEDICATIONS: ICD-10-CM

## 2025-08-01 PROCEDURE — 3078F DIAST BP <80 MM HG: CPT

## 2025-08-01 PROCEDURE — 99214 OFFICE O/P EST MOD 30 MIN: CPT

## 2025-08-01 PROCEDURE — 1160F RVW MEDS BY RX/DR IN RCRD: CPT

## 2025-08-01 PROCEDURE — 1123F ACP DISCUSS/DSCN MKR DOCD: CPT

## 2025-08-01 PROCEDURE — 3077F SYST BP >= 140 MM HG: CPT

## 2025-08-01 PROCEDURE — 1159F MED LIST DOCD IN RCRD: CPT

## 2025-08-01 RX ORDER — LORAZEPAM 1 MG/1
TABLET ORAL
Qty: 120 TABLET | Refills: 2 | Status: SHIPPED | OUTPATIENT
Start: 2025-08-16 | End: 2025-11-15

## 2025-08-01 RX ORDER — AZITHROMYCIN 250 MG/1
TABLET, FILM COATED ORAL
Qty: 6 TABLET | Refills: 0 | Status: SHIPPED | OUTPATIENT
Start: 2025-08-01 | End: 2025-08-11

## 2025-08-01 ASSESSMENT — ENCOUNTER SYMPTOMS
SORE THROAT: 1
RHINORRHEA: 1
DIARRHEA: 0
SHORTNESS OF BREATH: 0
EYE REDNESS: 0
EYE DISCHARGE: 0
WHEEZING: 0
SINUS PAIN: 0
NAUSEA: 0
PHOTOPHOBIA: 0
ABDOMINAL PAIN: 0
EYE ITCHING: 0
CHEST TIGHTNESS: 0
BACK PAIN: 0
COUGH: 1
VOICE CHANGE: 0
TROUBLE SWALLOWING: 0
SINUS PRESSURE: 1
COLOR CHANGE: 0
VOMITING: 0

## 2025-08-01 NOTE — PROGRESS NOTES
Sera Weaver (:  1955) is a 69 y.o. female,Established patient, here for evaluation of the following chief complaint(s):  Medication Check (3 month med check, pt c/o of sinus infection since , nose is sore to touch, drainage, every time she goes out it gets worse)      Assessment & Plan  KEVIN (generalized anxiety disorder)   Chronic, at goal (stable), continue current treatment plan  -Well-controlled on Ativan 4 times daily as needed.  -Med contract and UDS updated today.  -PDMP/OARRS reviewed.  -Refill sent to pharmacy.  -Follow-up in 3 months.    Orders:    LORazepam (ATIVAN) 1 MG tablet; TAKE 1 TABLET BY MOUTH FOUR TIMES DAILY AS NEEDED FOR ANXIETY. NO MORE THAN 4 TABLET TOTAL PER DAY    Acute non-recurrent maxillary sinusitis   Acute condition, new, Supportive care with appropriate antipyretics and fluids.  -Treatment options discussed.  Z-Hong as being sent to the pharmacy.   The medication uses and side effects were discussed with the patient.  Patient verbalized understanding and agrees to the plan.  - Emphasis on rest and hydration  -Follow-up with improvement  Orders:    azithromycin (ZITHROMAX) 250 MG tablet; 500mg on day 1 followed by 250mg on days 2 - 5    Encounter for long-term (current) use of medications       Orders:    DRUG SCREEN MULTI URINE; Future      Return in about 3 months (around 2025) for Controlled Medications.    Subjective       HPI  - recent sinus infection, grey/yellow/brown and a little bloody  - has been going of for almost a week  - chills, no fever, some sore throat in AM, lots of sinus pressure below eyes  - no breathing issues, no bowel issues  - some HA, L side tension  - appetite is okay, hydration is up and down  - worse when she goes outside, better inside   - not taking any OTCs  - struggling with weight loss  - trying berberine, not helping  - has been sleeping, not too active  - diet is the same as always  - BP has been elevated  - 2 meals a

## 2025-08-01 NOTE — ASSESSMENT & PLAN NOTE
Chronic, at goal (stable), continue current treatment plan  -Well-controlled on Ativan 4 times daily as needed.  -Med contract and UDS updated today.  -PDMP/OARRS reviewed.  -Refill sent to pharmacy.  -Follow-up in 3 months.    Orders:    LORazepam (ATIVAN) 1 MG tablet; TAKE 1 TABLET BY MOUTH FOUR TIMES DAILY AS NEEDED FOR ANXIETY. NO MORE THAN 4 TABLET TOTAL PER DAY

## 2025-08-02 LAB
AMPHETAMINES UR QL SCN>1000 NG/ML: POSITIVE
BARBITURATES UR QL SCN>200 NG/ML: ABNORMAL
BENZODIAZ UR QL SCN>200 NG/ML: POSITIVE
CANNABINOIDS UR QL SCN>50 NG/ML: ABNORMAL
COCAINE UR QL SCN: ABNORMAL
DRUG SCREEN COMMENT UR-IMP: ABNORMAL
FENTANYL SCREEN, URINE: ABNORMAL
METHADONE UR QL SCN>300 NG/ML: ABNORMAL
OPIATES UR QL SCN>300 NG/ML: ABNORMAL
OXYCODONE UR QL SCN: ABNORMAL
PCP UR QL SCN>25 NG/ML: ABNORMAL
PH UR STRIP: 5.5 [PH]

## (undated) DEVICE — SUTURE MCRYL + SZ 4-0 L18IN ABSRB UD L19MM PS-2 3/8 CIR MCP496G

## (undated) DEVICE — DRAPE,CHEST,FENES,15X10,STERIL: Brand: MEDLINE

## (undated) DEVICE — STERILE POLYISOPRENE POWDER-FREE SURGICAL GLOVES: Brand: PROTEXIS

## (undated) DEVICE — SOLUTION IV IRRIG POUR BRL 0.9% SODIUM CHL 2F7124

## (undated) DEVICE — GAUZE,SPONGE,4"X4",16PLY,XRAY,STRL,LF: Brand: MEDLINE

## (undated) DEVICE — STERILE LATEX POWDER-FREE SURGICAL GLOVESWITH NITRILE COATING: Brand: PROTEXIS

## (undated) DEVICE — SUTURE VCRL + SZ 3-0 L27IN ABSRB UD L26MM SH 1/2 CIR VCP416H

## (undated) DEVICE — SYRINGE,EAR/ULCER, 2 OZ, STERILE: Brand: MEDLINE

## (undated) DEVICE — PORT VLV 2 W NDL FREE SMRTSITE

## (undated) DEVICE — SUTURE ABSORBABLE MONOFILAMENT 3-0 SH 27 IN VIO PDS + PDP316H

## (undated) DEVICE — PEN: MARKING STD 100/CS: Brand: MEDICAL ACTION INDUSTRIES

## (undated) DEVICE — DRAPE,MINOR PROC,6X6 FEN, STER: Brand: MEDLINE

## (undated) DEVICE — TRAY ANES EPI 5CC GLS LL 18GA CUST

## (undated) DEVICE — SPECIMEN ORIENTATION CHARMS, SIX DISTINCTLY SHAPED STERILE 10MM CHARMS: Brand: MARGINMAP

## (undated) DEVICE — Device: Brand: JELCO

## (undated) DEVICE — MEDIA CONTRAST RX ISOVUE-300 61% 30ML VIALS

## (undated) DEVICE — SHEARS ENDOSCP L9CM CRV HARM FOCS +

## (undated) DEVICE — SKIN MARKER REGULAR TIP WITH RULER CAP AND LABELS: Brand: DEVON

## (undated) DEVICE — ATTACHMENT SMK EVAC FOR ES PNCL ACCUVAC

## (undated) DEVICE — BLADE ES ELASTOMERIC COAT INSUL DURABLE BEND UPTO 90DEG

## (undated) DEVICE — ADHESIVE SKIN CLOSURE TOP 36 CC HI VISC DERMBND MINI

## (undated) DEVICE — MINOR SET UP: Brand: MEDLINE INDUSTRIES, INC.

## (undated) DEVICE — Z DISCONTINUED USE 2272117 DRAPE SURG 3 QTR N INVASIVE 2 LAYR DISP

## (undated) DEVICE — SYRINGE MED 10ML LUERLOCK TIP W/O SFTY DISP

## (undated) DEVICE — 7496-8Z MINI-PLUS KIT: Brand: DEVON

## (undated) DEVICE — GLOVE ORANGE PI 7   MSG9070

## (undated) DEVICE — TUBING, SUCTION, 1/4" X 12', STRAIGHT: Brand: MEDLINE

## (undated) DEVICE — STANDARD HYPODERMIC NEEDLE,POLYPROPYLENE HUB: Brand: MONOJECT

## (undated) DEVICE — 6 ML SYRINGE LUER-LOCK TIP: Brand: MONOJECT

## (undated) DEVICE — PROVE COVER: Brand: UNBRANDED

## (undated) DEVICE — CANISTER, RIGID, 1200CC: Brand: MEDLINE INDUSTRIES, INC.

## (undated) DEVICE — Z DISCONTINUED USE 2516375 APPLICATOR MEDICATED 3 CC CLR STRL CHLORAPREP

## (undated) DEVICE — INTENDED FOR TISSUE SEPARATION, AND OTHER PROCEDURES THAT REQUIRE A SHARP SURGICAL BLADE TO PUNCTURE OR CUT.: Brand: BARD-PARKER ® STAINLESS STEEL BLADES

## (undated) DEVICE — 3 ML SYRINGE LUER-LOCK TIP: Brand: MONOJECT